# Patient Record
Sex: FEMALE | Race: WHITE | Employment: FULL TIME | URBAN - METROPOLITAN AREA
[De-identification: names, ages, dates, MRNs, and addresses within clinical notes are randomized per-mention and may not be internally consistent; named-entity substitution may affect disease eponyms.]

---

## 2017-05-26 ENCOUNTER — HOSPITAL ENCOUNTER (OUTPATIENT)
Age: 42
Discharge: HOME | End: 2017-05-26
Payer: COMMERCIAL

## 2017-05-26 LAB
ABSOLUTE EOS #: 0.1 K/UL (ref 0–0.4)
ABSOLUTE LYMPH #: 1.4 K/UL (ref 1–4.8)
ABSOLUTE MONO #: 0.3 K/UL (ref 0.1–1.3)
ALBUMIN SERPL-MCNC: 4 G/DL (ref 3.5–5.2)
ALBUMIN/GLOBULIN RATIO: ABNORMAL (ref 1–2.5)
ALP BLD-CCNC: 46 U/L (ref 35–104)
ALT SERPL-CCNC: 12 U/L (ref 5–33)
ANION GAP SERPL CALCULATED.3IONS-SCNC: 11 MMOL/L (ref 9–17)
AST SERPL-CCNC: 18 U/L
BASOPHILS # BLD: 1 %
BASOPHILS ABSOLUTE: 0 K/UL (ref 0–0.2)
BILIRUB SERPL-MCNC: 0.54 MG/DL (ref 0.3–1.2)
BUN BLDV-MCNC: 8 MG/DL (ref 6–20)
BUN/CREAT BLD: ABNORMAL (ref 9–20)
CALCIUM SERPL-MCNC: 9 MG/DL (ref 8.6–10.4)
CHLORIDE BLD-SCNC: 103 MMOL/L (ref 98–107)
CHOLESTEROL/HDL RATIO: 1.4
CHOLESTEROL: 113 MG/DL
CO2: 25 MMOL/L (ref 20–31)
CREAT SERPL-MCNC: 0.45 MG/DL (ref 0.5–0.9)
DIFFERENTIAL TYPE: ABNORMAL
EOSINOPHILS RELATIVE PERCENT: 2 %
ESTIMATED AVERAGE GLUCOSE: 134 MG/DL
FOLATE: >20 NG/ML
GFR AFRICAN AMERICAN: >60 ML/MIN
GFR NON-AFRICAN AMERICAN: >60 ML/MIN
GFR SERPL CREATININE-BSD FRML MDRD: ABNORMAL ML/MIN/{1.73_M2}
GFR SERPL CREATININE-BSD FRML MDRD: ABNORMAL ML/MIN/{1.73_M2}
GLUCOSE BLD-MCNC: 152 MG/DL (ref 70–99)
HBA1C MFR BLD: 6.3 % (ref 4–6)
HCT VFR BLD CALC: 36.8 % (ref 36–46)
HDLC SERPL-MCNC: 80 MG/DL
HEMOGLOBIN: 11.9 G/DL (ref 12–16)
IRON: 114 UG/DL (ref 37–145)
LDL CHOLESTEROL: 27 MG/DL (ref 0–130)
LYMPHOCYTES # BLD: 28 %
MAGNESIUM: 1.5 MG/DL (ref 1.6–2.6)
MCH RBC QN AUTO: 26.3 PG (ref 26–34)
MCHC RBC AUTO-ENTMCNC: 32.3 G/DL (ref 31–37)
MCV RBC AUTO: 81.4 FL (ref 80–100)
MONOCYTES # BLD: 7 %
PDW BLD-RTO: 14 % (ref 11.5–14.9)
PHOSPHORUS: 4 MG/DL (ref 2.6–4.5)
PLATELET # BLD: 201 K/UL (ref 150–450)
PLATELET ESTIMATE: ABNORMAL
PMV BLD AUTO: 9.4 FL (ref 6–12)
POTASSIUM SERPL-SCNC: 4.5 MMOL/L (ref 3.7–5.3)
RBC # BLD: 4.53 M/UL (ref 4–5.2)
RBC # BLD: ABNORMAL 10*6/UL
SEG NEUTROPHILS: 62 %
SEGMENTED NEUTROPHILS ABSOLUTE COUNT: 3.1 K/UL (ref 1.3–9.1)
SODIUM BLD-SCNC: 139 MMOL/L (ref 135–144)
TOTAL PROTEIN: 6.7 G/DL (ref 6.4–8.3)
TRIGL SERPL-MCNC: 31 MG/DL
VITAMIN B-12: 1521 PG/ML (ref 211–946)
VITAMIN D 25-HYDROXY: 21.5 NG/ML (ref 30–100)
VLDLC SERPL CALC-MCNC: NORMAL MG/DL (ref 1–30)
WBC # BLD: 4.9 K/UL (ref 3.5–11)
WBC # BLD: ABNORMAL 10*3/UL

## 2017-05-26 PROCEDURE — 83735 ASSAY OF MAGNESIUM: CPT

## 2017-05-26 PROCEDURE — 80061 LIPID PANEL: CPT

## 2017-05-26 PROCEDURE — 80053 COMPREHEN METABOLIC PANEL: CPT

## 2017-05-26 PROCEDURE — 84100 ASSAY OF PHOSPHORUS: CPT

## 2017-05-26 PROCEDURE — 82306 VITAMIN D 25 HYDROXY: CPT

## 2017-05-26 PROCEDURE — 83036 HEMOGLOBIN GLYCOSYLATED A1C: CPT

## 2017-05-26 PROCEDURE — 82746 ASSAY OF FOLIC ACID SERUM: CPT

## 2017-05-26 PROCEDURE — 85025 COMPLETE CBC W/AUTO DIFF WBC: CPT

## 2017-05-26 PROCEDURE — 84425 ASSAY OF VITAMIN B-1: CPT

## 2017-05-26 PROCEDURE — 36415 COLL VENOUS BLD VENIPUNCTURE: CPT

## 2017-05-26 PROCEDURE — 83540 ASSAY OF IRON: CPT

## 2017-05-26 PROCEDURE — 82607 VITAMIN B-12: CPT

## 2017-05-29 LAB — VITAMIN B1 WHOLE BLOOD: 78 NMOL/L (ref 70–180)

## 2021-07-19 ENCOUNTER — HOSPITAL ENCOUNTER (OUTPATIENT)
Age: 46
Discharge: HOME | End: 2021-07-19
Payer: COMMERCIAL

## 2021-07-19 LAB
ABSOLUTE EOS #: 0.2 K/UL (ref 0–0.4)
ABSOLUTE IMMATURE GRANULOCYTE: ABNORMAL K/UL (ref 0–0.3)
ABSOLUTE LYMPH #: 1.3 K/UL (ref 1–4.8)
ABSOLUTE MONO #: 0.4 K/UL (ref 0.1–1.3)
ALBUMIN SERPL-MCNC: 4.2 G/DL (ref 3.5–5.2)
ALBUMIN/GLOBULIN RATIO: ABNORMAL (ref 1–2.5)
ALP BLD-CCNC: 53 U/L (ref 35–104)
ALT SERPL-CCNC: 18 U/L (ref 5–33)
ANION GAP SERPL CALCULATED.3IONS-SCNC: 10 MMOL/L (ref 9–17)
AST SERPL-CCNC: 21 U/L
BASOPHILS # BLD: 1 % (ref 0–2)
BASOPHILS ABSOLUTE: 0 K/UL (ref 0–0.2)
BILIRUB SERPL-MCNC: 0.53 MG/DL (ref 0.3–1.2)
BUN BLDV-MCNC: 9 MG/DL (ref 6–20)
BUN/CREAT BLD: ABNORMAL (ref 9–20)
CALCIUM SERPL-MCNC: 9.2 MG/DL (ref 8.6–10.4)
CHLORIDE BLD-SCNC: 108 MMOL/L (ref 98–107)
CO2: 25 MMOL/L (ref 20–31)
CREAT SERPL-MCNC: 0.5 MG/DL (ref 0.5–0.9)
DIFFERENTIAL TYPE: ABNORMAL
EOSINOPHILS RELATIVE PERCENT: 5 % (ref 0–4)
FERRITIN: 57 UG/L (ref 13–150)
FOLATE: 13.2 NG/ML
GFR AFRICAN AMERICAN: >60 ML/MIN
GFR NON-AFRICAN AMERICAN: >60 ML/MIN
GFR SERPL CREATININE-BSD FRML MDRD: ABNORMAL ML/MIN/{1.73_M2}
GFR SERPL CREATININE-BSD FRML MDRD: ABNORMAL ML/MIN/{1.73_M2}
GLUCOSE BLD-MCNC: 147 MG/DL (ref 70–99)
HCT VFR BLD CALC: 37.2 % (ref 36–46)
HEMOGLOBIN: 12.2 G/DL (ref 12–16)
IMMATURE GRANULOCYTES: ABNORMAL %
IRON SATURATION: 29 % (ref 20–55)
IRON: 93 UG/DL (ref 37–145)
LYMPHOCYTES # BLD: 26 % (ref 24–44)
MAGNESIUM: 1.5 MG/DL (ref 1.6–2.6)
MCH RBC QN AUTO: 27.9 PG (ref 26–34)
MCHC RBC AUTO-ENTMCNC: 32.9 G/DL (ref 31–37)
MCV RBC AUTO: 85 FL (ref 80–100)
MONOCYTES # BLD: 8 % (ref 1–7)
NRBC AUTOMATED: ABNORMAL PER 100 WBC
PDW BLD-RTO: 13.2 % (ref 11.5–14.9)
PHOSPHORUS: 3.3 MG/DL (ref 2.6–4.5)
PLATELET # BLD: 218 K/UL (ref 150–450)
PLATELET ESTIMATE: ABNORMAL
PMV BLD AUTO: 8.6 FL (ref 6–12)
POTASSIUM SERPL-SCNC: 4.8 MMOL/L (ref 3.7–5.3)
RBC # BLD: 4.37 M/UL (ref 4–5.2)
RBC # BLD: ABNORMAL 10*6/UL
SEG NEUTROPHILS: 60 % (ref 36–66)
SEGMENTED NEUTROPHILS ABSOLUTE COUNT: 3.1 K/UL (ref 1.3–9.1)
SODIUM BLD-SCNC: 143 MMOL/L (ref 135–144)
TOTAL IRON BINDING CAPACITY: 317 UG/DL (ref 250–450)
TOTAL PROTEIN: 6.9 G/DL (ref 6.4–8.3)
UNSATURATED IRON BINDING CAPACITY: 224 UG/DL (ref 112–347)
VITAMIN B-12: 531 PG/ML (ref 232–1245)
VITAMIN D 25-HYDROXY: 19.1 NG/ML (ref 30–100)
WBC # BLD: 5 K/UL (ref 3.5–11)
WBC # BLD: ABNORMAL 10*3/UL

## 2021-07-19 PROCEDURE — 85025 COMPLETE CBC W/AUTO DIFF WBC: CPT

## 2021-07-19 PROCEDURE — 83735 ASSAY OF MAGNESIUM: CPT

## 2021-07-19 PROCEDURE — 80053 COMPREHEN METABOLIC PANEL: CPT

## 2021-07-19 PROCEDURE — 82728 ASSAY OF FERRITIN: CPT

## 2021-07-19 PROCEDURE — 83540 ASSAY OF IRON: CPT

## 2021-07-19 PROCEDURE — 84425 ASSAY OF VITAMIN B-1: CPT

## 2021-07-19 PROCEDURE — 82607 VITAMIN B-12: CPT

## 2021-07-19 PROCEDURE — 83550 IRON BINDING TEST: CPT

## 2021-07-19 PROCEDURE — 84100 ASSAY OF PHOSPHORUS: CPT

## 2021-07-19 PROCEDURE — 82306 VITAMIN D 25 HYDROXY: CPT

## 2021-07-19 PROCEDURE — 36415 COLL VENOUS BLD VENIPUNCTURE: CPT

## 2021-07-19 PROCEDURE — 82746 ASSAY OF FOLIC ACID SERUM: CPT

## 2021-07-23 LAB — VITAMIN B1 WHOLE BLOOD: 95 NMOL/L (ref 70–180)

## 2021-11-01 DIAGNOSIS — M25.571 CHRONIC PAIN OF RIGHT ANKLE: Primary | ICD-10-CM

## 2021-11-01 DIAGNOSIS — G89.29 CHRONIC PAIN OF RIGHT ANKLE: Primary | ICD-10-CM

## 2021-11-19 ENCOUNTER — OFFICE VISIT (OUTPATIENT)
Dept: ORTHOPEDIC SURGERY | Age: 46
End: 2021-11-19
Payer: COMMERCIAL

## 2021-11-19 VITALS — BODY MASS INDEX: 29.49 KG/M2 | WEIGHT: 177 LBS | HEIGHT: 65 IN

## 2021-11-19 DIAGNOSIS — M19.011 ARTHROSIS OF RIGHT ACROMIOCLAVICULAR JOINT: Primary | ICD-10-CM

## 2021-11-19 DIAGNOSIS — M25.511 RIGHT SHOULDER PAIN, UNSPECIFIED CHRONICITY: ICD-10-CM

## 2021-11-19 PROCEDURE — 99203 OFFICE O/P NEW LOW 30 MIN: CPT | Performed by: ORTHOPAEDIC SURGERY

## 2021-11-19 NOTE — LETTER
11/19/2021    Marylu Webster [address], [address] [facility]    RE: [place]    Dear Dr. Norah Jang,    Thank you for allowing me to participate in the care of Ms. Lázaro Belle. I had the opportunity to evaluate the patient on 11/19/2021. Attached you will find my evaluation and recommendations. Thanks again for the confidence you have expressed in me by allowing my participation in the care of your patient. I will keep you apprised of further developments in the patients treatment course as it progresses. If I can be of further assistance in any fashion, please feel free to contact me at your convenience.     Sincerely,         Mohamud Friedman  Shoulder and Elbow Surgery

## 2021-11-19 NOTE — PROGRESS NOTES
Orthopedic Shoulder Encounter Note     Chief complaint: right shoulder pain    HPI: Zhang Bra is a 51 y.o.  right-hand dominant female who presents for evaluation of her right shoulder. She indicates that she has been having pain for about 2 to 3 years and has been worse over the course of the past 2 to 3 months. She works a physical job at [employer]. She denies any precipitating trauma or injury but has been dealing with superior shoulder pain typically with activity although she is unable to lay on the right side at nighttime. Her shoulder does feel weak but she denies any stiffness. Previous treatment:    NSAIDs: None    Physical Therapy: No    Injections: She thinks that she may have had at least 3 cortisone injections to this right shoulder the last one coming approximately 1 year ago    Surgeries: None    Review of Systems:   Constitutional: Negative for fever, chills, sweats. Pain Score:   6  Neurological: Negative for headache, numbness, or weakness. Musculoskeletal: As noted in HPI     Past Medical History  Tyler Chawla  has a past medical history of Diabetes ([address] and Hypertension. Past Surgical History  Tyler Chawla  has a past surgical history that includes Ankle fracture surgery (Right); Cholecystectomy; and Growth plate surgery. Current Medications  Current Outpatient Medications   Medication Sig Dispense Refill    citalopram (CELEXA) 10 MG tablet Take 10 mg by mouth daily      sitaGLIPtin (JANUVIA) 50 MG tablet Take 50 mg by mouth daily (Patient not taking: Reported on 11/19/2021)      predniSONE (DELTASONE) 10 MG tablet Take 1 tablet by mouth daily 40 mg ×3 days, 30 mg ×3 days, 20 mg ×3 days, 10 mg ×3 days, then stop. (Patient not taking: Reported on 11/19/2021) 30 tablet 0    insulin glulisine (APIDRA) 100 UNIT/ML injection Inject 30 Units into the skin 3 times daily (before meals).  (Patient not taking: Reported on 11/19/2021)      insulin glargine (LANTUS) 100 UNIT/ML injection vial Inject 40 Units into the skin every morning. (Patient not taking: Reported on 11/19/2021)      lisinopril (PRINIVIL;ZESTRIL) 20 MG tablet Take 20 mg by mouth daily. (Patient not taking: Reported on 11/19/2021)      [employer] ([address] [employer]) [address] 1 pair of bariatric cruthes (Patient not taking: Reported on 11/19/2021) 1 each 0     No current facility-administered medications for this visit. Allergies  Allergies have been reviewed. Be Beard has No Known Allergies. Social History  Be Beard  reports that she has never smoked. She has never used smokeless tobacco. She reports current alcohol use. She reports that she does not use drugs. Family History  Liliana's family history is not on file. Physical Exam:     Ht 5' 5\" (1.651 m)   Wt 177 lb (80.3 kg)   BMI 29.45 kg/m²    Constitutional: Patient is oriented to person, place, and time. Patient appears well-developed and well nourished. Mental Status/psychiatric: Behavior is normal. Thought content normal.  HENT: Negative otherwise noted  Head: Normocephalic and Atraumatic  Nose: Normal  Respiratory/Cardio: Effort normal. No respiratory distress. Gait: normal    Shoulder:    Skin: Skin is warm and dry; no swelling or obvious muscular atrophy.    Vasculature: 2+ radial pulses bilaterally  Neuro: Sensation grossly intact to light touch diffusely  Tenderness: Palpation over the superior aspect of the right shoulder    ROM: (Degrees)    Right   A P   Left   A P    Elevation  140 155   Elevation  140   Abduction  135 145   Abduction  130    ER   70 75   ER   70   IR   T12    IR   T12   90 abd/ER      90 abd/ER     90 abd/IR      90 abd/IR     Crepitation  No    Crepitation No  Dyskenesia  No    Dyskenesia No      Muscle strength:    Right       Left    Deltoid   5    Deltoid   5  Supraspinatus  5    Supraspinatus  5  ER   5    ER   5  IR   5    IR   5    Special tests    Right   Rotator Cuff    Left    y   Painful arc    n   n   Pain with ER    n    n   Neer's     n    n   Hawkin's    n    n   Drop Arm    n  n   Lift off/Belly Press   n  n   ER Lag    n          AC Joint  y   AC tenderness   n  y   Cross-chest adduction  n       Labrum/biceps    y   [place]'s    n   n   Biceps sheer    n      n   Speed's/Yergason's   n    n   Tenderness Biceps Groove  n    n   Alexander's    n         Instability  n   Ant Apprehension   n    n   Post Apprehension   n    n   Ant Load shift    n    n   Post Load shift   n   n   Sulcus     n  n   Generalized Laxity   n  n   Relocation test   n  n   Crank test     n  n   [place]-superior escape  n       Imaging:  Xrays: 4 views of the right shoulder obtained on 11/19/21 were independently reviewed  Indications: Right shoulder pain  Findings: Normal glenohumeral joint space. Mild osteophytic change involving the distal clavicle at the acromioclavicular joint. Type II acromion. No obvious fracture, dislocation, or subluxation. Impression: Right shoulder radiographs with mild degenerative changes at the acromioclavicular joint. Impression/Plan:     Joanie Hope is a 51 y.o. old female with right shoulder pain that appears to be consistent with acromioclavicular joint arthrosis. I had a discussion with the patient today about this and discussed treatment options available to her. I did recommend proceeding conservatively at this time with a cortisone injection to the acromioclavicular joint. I would recommend to be done under ultrasound guidance to increase the accuracy of the injection. Consequently she was referred to Dr. Jenelle Short for this injection. She was amenable to this plan. All questions were answered. I anticipate improvement in resolution of her symptoms with this treatment and so I will see her back in my clinic as needed but she was encouraged to return or call at anytime with persistent or worsening symptoms and with any questions or concerns.     This note is created with the assistance of bryson speech recognition program.  While intending to generate adocument that actually reflects the content of the visit, the document can still have some errors including those of syntax and sound a like substitutions which may escape proof reading. It such instances, actual meaningcan be extrapolated by contextual diversion.     NA = Not assessed  RTC = Rotator cuff  RCT = Rotator cuff tear  ER = External rotation  IR = Internal rotation  AC = Acromioclavicular  GH = Glenohumeral  n = No  y = Yes

## 2021-11-22 ENCOUNTER — OFFICE VISIT (OUTPATIENT)
Dept: ORTHOPEDIC SURGERY | Age: 46
End: 2021-11-22
Payer: COMMERCIAL

## 2021-11-22 VITALS — DIASTOLIC BLOOD PRESSURE: 85 MMHG | HEART RATE: 61 BPM | SYSTOLIC BLOOD PRESSURE: 140 MMHG

## 2021-11-22 DIAGNOSIS — M19.011 ARTHROSIS OF RIGHT ACROMIOCLAVICULAR JOINT: Primary | ICD-10-CM

## 2021-11-22 PROCEDURE — 99203 OFFICE O/P NEW LOW 30 MIN: CPT | Performed by: FAMILY MEDICINE

## 2021-11-22 PROCEDURE — 20606 DRAIN/INJ JOINT/BURSA W/US: CPT | Performed by: FAMILY MEDICINE

## 2021-11-22 RX ORDER — TRIAMCINOLONE ACETONIDE 40 MG/ML
40 INJECTION, SUSPENSION INTRA-ARTICULAR; INTRAMUSCULAR ONCE
Status: COMPLETED | OUTPATIENT
Start: 2021-11-22 | End: 2021-11-22

## 2021-11-22 RX ORDER — BUPIVACAINE HYDROCHLORIDE 5 MG/ML
1 INJECTION, SOLUTION PERINEURAL ONCE
Status: COMPLETED | OUTPATIENT
Start: 2021-11-22 | End: 2021-11-22

## 2021-11-22 RX ADMIN — BUPIVACAINE HYDROCHLORIDE 5 MG: 5 INJECTION, SOLUTION PERINEURAL at 16:38

## 2021-11-22 RX ADMIN — TRIAMCINOLONE ACETONIDE 40 MG: 40 INJECTION, SUSPENSION INTRA-ARTICULAR; INTRAMUSCULAR at 16:39

## 2021-11-22 NOTE — PROGRESS NOTES
Sports Medicine Consultation    CHIEF COMPLAINT:  Shoulder Pain (Rt shoulder. 2-3yrs off and on. worse over last month. no trauma. [facility] referral for [facility] injection)        HPI:   The patient is a 51 y.o. female who is being seen as a new patient being seen for regarding new problem r shoulder. The patient is a right hand dominant female who has had shoulder pain for years. As far as trauma to the shoulder, the patient indicates no specific. The pain is  worse at night and when doing overhead activities. Weakness of the shoulder has  been noted. The pain restricts activities such as none, just hurts. The pain does not seem to improve with time. The following medications have been tried: tylenol without benefit. Physical Therapy has not been tried. Corticosteroid injection has not been done. Neck pain has not been present. she has a past medical history of Diabetes ([address] and Hypertension. she has a past surgical history that includes Ankle fracture surgery (Right); Cholecystectomy; and Growth plate surgery. Past Medical History:   Diagnosis Date    Diabetes ([address]     Hypertension        Past Surgical History:   Procedure Laterality Date    ANKLE FRACTURE SURGERY Right     pins    CHOLECYSTECTOMY      GROWTH PLATE SURGERY      plate in head       family history is not on file. Social History     Socioeconomic History    Marital status: Single     Spouse name: Not on file    Number of children: Not on file    Years of education: Not on file    Highest education level: Not on file   Occupational History    Not on file   Tobacco Use    Smoking status: Never Smoker    Smokeless tobacco: Never Used   Substance and Sexual Activity    Alcohol use:  Yes    Drug use: No    Sexual activity: Not on file   Other Topics Concern    Not on file   Social History Narrative    Not on file     Social Determinants of Health     Financial Resource Strain:     Difficulty of Paying Living Expenses: Not on file   Food Insecurity:     Worried About Running Out of Food in the Last Year: Not on file    Gerardo of Food in the Last Year: Not on file   Transportation Needs:     Lack of Transportation (Medical): Not on file    Lack of Transportation (Non-Medical): Not on file   Physical Activity:     Days of Exercise per Week: Not on file    Minutes of Exercise per Session: Not on file   Stress:     Feeling of Stress : Not on file   Social Connections:     Frequency of Communication with Friends and Family: Not on file    Frequency of Social Gatherings with Friends and Family: Not on file    Attends Shinto Services: Not on file    Active Member of [address] [place] [address] [employer] or Organizations: Not on file    Attends Club or Organization Meetings: Not on file    Marital Status: Not on file   Intimate Partner Violence:     Fear of Current or Ex-Partner: Not on file    Emotionally Abused: Not on file    Physically Abused: Not on file    Sexually Abused: Not on file   Housing Stability:     Unable to Pay for Housing in the Last Year: Not on file    Number of [place] in the Last Year: Not on file    Unstable Housing in the Last Year: Not on file       Current Outpatient Medications   Medication Sig Dispense Refill    sitaGLIPtin (JANUVIA) 50 MG tablet Take 50 mg by mouth daily (Patient not taking: Reported on 11/19/2021)      citalopram (CELEXA) 10 MG tablet Take 10 mg by mouth daily      predniSONE (DELTASONE) 10 MG tablet Take 1 tablet by mouth daily 40 mg ×3 days, 30 mg ×3 days, 20 mg ×3 days, 10 mg ×3 days, then stop. (Patient not taking: Reported on 11/19/2021) 30 tablet 0    insulin glulisine (APIDRA) 100 UNIT/ML injection Inject 30 Units into the skin 3 times daily (before meals). (Patient not taking: Reported on 11/19/2021)      insulin glargine (LANTUS) 100 UNIT/ML injection vial Inject 40 Units into the skin every morning.  (Patient not taking: Reported on 11/19/2021)      lisinopril (PRINIVIL;ZESTRIL) 20 MG tablet Take 20 mg by mouth daily. (Patient not taking: Reported on 11/19/2021)      [employer] ([address]) [address] 1 pair of bariatric cruthes (Patient not taking: Reported on 11/19/2021) 1 each 0     No current facility-administered medications for this visit. Allergies:  shehas No Known Allergies. ROS:  CV:  Denies chest pain; palpitations; shortness of breath; swelling of feet, ankles; and loss of consciousness. CON: Denies fever and dizziness. ENT:  Denies hearing loss / ringing, ear infections hoarseness, and swallowing problems. RESP:  Denies chronic cough, spitting up blood, and asthma/wheezing. GI: Denies abdominal pain, change in bowel habits, nausea or vomiting, and blood in stools. :  Denies frequent urination, burning or painful urination, blood in the urine, and bladder incontinence. NEURO:  Denies headache, memory loss, sleep disturbance, and tremor or movement disorder. PHYSICAL EXAM:   BP (!) 143/85   Pulse 57   GENERAL: Charlsie Goltz is a 51 y.o. female who is alert and oriented and sitting comfortably in our office. SKIN:  Intact without rashes, lesions or ulcerations. No obvious deformity or swelling. NEURO: Musculoskeletal and axillary nerves intact to sensory and motor testing. EYES:  Extraocular muscles intact. MOUTH: Oral mucosa moist.  No perioral lesions. PULM:  Respirations unlabored and regular. VASC:  Capillary refill less than 3 seconds. Cervical spine ROM WNL  Spurlings: negative,     MSK:  Forward elevation 180 degrees, external rotation in neutral 90 degrees, abduction 180 degrees, internal rotation to T8. Supraspinatus 5/5   External rotators 5/5  Internal 5/5  Full Can negative   Empty Can negative   Neer's test negative   Cruz-Ismael test. negative. Pain with cross body adduction positive. Anterior Labral Stress test negative. Speed's test negative   Ossian's test negative.    Pain over Patient tolerated the procedure well.

## 2022-01-10 ENCOUNTER — OFFICE VISIT (OUTPATIENT)
Dept: ORTHOPEDIC SURGERY | Age: 47
End: 2022-01-10
Payer: COMMERCIAL

## 2022-01-10 VITALS — WEIGHT: 177 LBS | RESPIRATION RATE: 12 BRPM | HEIGHT: 65 IN | BODY MASS INDEX: 29.49 KG/M2

## 2022-01-10 DIAGNOSIS — M79.671 RIGHT FOOT PAIN: Primary | ICD-10-CM

## 2022-01-10 DIAGNOSIS — S92.424A CLOSED NONDISPLACED FRACTURE OF DISTAL PHALANX OF RIGHT GREAT TOE, INITIAL ENCOUNTER: Primary | ICD-10-CM

## 2022-01-10 PROCEDURE — 99213 OFFICE O/P EST LOW 20 MIN: CPT | Performed by: ORTHOPAEDIC SURGERY

## 2022-01-10 NOTE — PROGRESS NOTES
[address] [facility] ORTHOPEDICS AND SPORTS MEDICINE  [address] [facility]  [address] [facility] [address]  Dept: [phone]    Ambulatory Orthopedic Consult      CHIEF COMPLAINT:    Chief Complaint   Patient presents with    Toe Pain     Right, great toe       HISTORY OF PRESENT ILLNESS:      The patient is a 51 y.o. female who is being seen for evaluation of the above, which began on 1/6/2022 secondary to a fall down several steps  . At today's visit, she is using a cast shoe. History is obtained today from:   [x]  the patient     [x]  EMR     []  one family member/friend    []  multiple family members/friends    []  other:          REVIEW OF SYSTEMS:  Musculoskeletal: See HPI for pertinent positives     Past Medical History:    She  has a past medical history of Diabetes ([address] and Hypertension. Past Surgical History:    She  has a past surgical history that includes Ankle fracture surgery (Right); Cholecystectomy; and Growth plate surgery. Current Medications:     Current Outpatient Medications:     sitaGLIPtin (JANUVIA) 50 MG tablet, Take 50 mg by mouth daily (Patient not taking: Reported on 11/19/2021), Disp: , Rfl:     citalopram (CELEXA) 10 MG tablet, Take 10 mg by mouth daily, Disp: , Rfl:     predniSONE (DELTASONE) 10 MG tablet, Take 1 tablet by mouth daily 40 mg ×3 days, 30 mg ×3 days, 20 mg ×3 days, 10 mg ×3 days, then stop. (Patient not taking: Reported on 11/19/2021), Disp: 30 tablet, Rfl: 0    insulin glulisine (APIDRA) 100 UNIT/ML injection, Inject 30 Units into the skin 3 times daily (before meals). (Patient not taking: Reported on 11/19/2021), Disp: , Rfl:     insulin glargine (LANTUS) 100 UNIT/ML injection vial, Inject 40 Units into the skin every morning. (Patient not taking: Reported on 11/19/2021), Disp: , Rfl:     lisinopril (PRINIVIL;ZESTRIL) 20 MG tablet, Take 20 mg by mouth daily.  (Patient not taking: Reported on 11/19/2021), Disp: , Rfl:     [employer] Devices ([address]) MISC, 1 pair of bariatric cruthes (Patient not taking: Reported on 11/19/2021), Disp: 1 each, Rfl: 0     Allergies:    Patient has no known allergies. Family History:  family history is not on file. Social History:   Social History     Occupational History    Not on file   Tobacco Use    Smoking status: Never Smoker    Smokeless tobacco: Never Used   Substance and Sexual Activity    Alcohol use: Yes    Drug use: No    Sexual activity: Not on file     Works full-time at [employer]:  Resp 12   Ht 5' 5\" (1.651 m)   Wt 177 lb (80.3 kg)   BMI 29.45 kg/m²    Psych: awake, alert  Cardio:  well perfused extremities, no cyanosis  Resp:  normal respiratory effort  Musculoskeletal:    Affected lower extremity:    Vascular: Limb well perfused, compartments soft/compressible. Skin: No erythema/ulcers. Intact. Neurovascular Status:  Grossly neurovascularly intact throughout  Motion:  Grossly able to fire major muscle groups with appropriate/expected AROM  Tenderness to Palpation: Great toe diffusely--mild  -Significant ecchymosis diffusely throughout the great toe      RADIOLOGY:   1/10/2022 FINDINGS:  Three views (AP, Mortise, and Lateral) of the right ankle and three views (AP, Oblique, Lateral) of the right foot were obtained in the office today and reviewed, revealing minimally displaced fracture at the distal phalanx of the great toe with intra-articular extension. Retained hardware in the distal fibula with degenerative changes at the tibiotalar joint with joint space narrowing, sclerosis, and osteophytes. .     IMPRESSION: Osseous injury as above. Electronically signed by Rustam Dillon MD      Relevant previous imaging reviewed, both imaging and report(s) as below:    No results found. ASSESSMENT AND PLAN:  Body mass index is 29.45 kg/m².        She has a right great toe fracture of the distal phalanx, sustained on 1/6/2022. Notably, she has the past medical history as above. She has a history of insulin-dependent diabetes. We had a discussion today about the likely diagnosis and its natural history, physical exam and imaging findings, as well as various treatment options in detail. Surgically, we discussed that I do not recommend surgical invention at this time, and did recommend conservative management at today's visit, but did discuss a possible future surgery depending on her possible displacement. We did discuss the risk of displacement and possible future pain/arthritis/surgery. Orders/referrals were placed as below at today's visit. The patient will avoid pain provoking activity. She may continue weightbearing as tolerated, and she will continue to use her cast shoe. All questions were answered and the above plan was agreed upon. The patient will return to clinic in 2 weeks with repeat right foot x-rays to monitor for displacement. At her next visit, we will review her new x-rays, and possibly discuss her retained hardware and ankle arthritis. At the patient's next visit, depending on how the patient is doing and/or new imaging/labs results, we may consider the following options:    []  Lace up ankle     []  CAM boot         []  removable wrist brace     []  PT:        []  Wean out immobilization         []  Adv activity      []  Footmind        []  Spenco       []  Custom Orthotic:               []  AZ brace                    []  Rocker Bottom      []  Night splint    []  Heel cups        []  Strap        []  Toe gizmos    []  Topl        []  NSAIDs         []  Sahil        []  Ref:         []  Stress Xray    []  CT        []  MRI  []  Inj:          []  Consider OR      []  Pick OR date    No follow-ups on file. No orders of the defined types were placed in this encounter. No orders of the defined types were placed in this encounter.         Slava Pulido, MD  Orthopedic Surgery        Please excuse any typos/errors, as this note was created with the assistance of voice recognition software. While intending to generate a document that actually reflects the content of the visit, the document can still have some errors including those of syntax and sound-a-like substitutions which may escape proof reading. In such instances, actual meaning can be extrapolated by context.

## 2022-01-10 NOTE — LETTER
[facility]  [address] [address] [place] [id number]  Phone: [phone]  Fax: [phone]    Lorenza Garcia MD    January 10, 2022     [address], [address] [facility]    Patient: Finn Ibrahim   MR Number: [id number]   YOB: 1975   Date of Visit: 1/10/2022       Dear Jason Junior: Thank you for referring Adia Brush to me for evaluation/treatment. Below are the relevant portions of my assessment and plan of care. She has a right great toe fracture of the distal phalanx, sustained on 1/6/2022. Notably, she has the past medical history as above. She has a history of insulin-dependent diabetes. We had a discussion today about the likely diagnosis and its natural history, physical exam and imaging findings, as well as various treatment options in detail. Surgically, we discussed that I do not recommend surgical invention at this time, and did recommend conservative management at today's visit, but did discuss a possible future surgery depending on her possible displacement. We did discuss the risk of displacement and possible future pain/arthritis/surgery. Orders/referrals were placed as below at today's visit. The patient will avoid pain provoking activity. She may continue weightbearing as tolerated, and she will continue to use her cast shoe. All questions were answered and the above plan was agreed upon. The patient will return to clinic in 2 weeks with repeat right foot x-rays to monitor for displacement. At her next visit, we will review her new x-rays, and possibly discuss her retained hardware and ankle arthritis. If you have questions, please do not hesitate to call me. I look forward to following Ivania Yuan along with you.     Sincerely,      Lorenza Garcia MD

## 2022-01-21 DIAGNOSIS — M79.671 RIGHT FOOT PAIN: Primary | ICD-10-CM

## 2022-01-21 DIAGNOSIS — S92.424A CLOSED NONDISPLACED FRACTURE OF DISTAL PHALANX OF RIGHT GREAT TOE, INITIAL ENCOUNTER: ICD-10-CM

## 2022-01-24 ENCOUNTER — OFFICE VISIT (OUTPATIENT)
Dept: ORTHOPEDIC SURGERY | Age: 47
End: 2022-01-24
Payer: COMMERCIAL

## 2022-01-24 VITALS — WEIGHT: 177 LBS | RESPIRATION RATE: 12 BRPM | HEIGHT: 65 IN | BODY MASS INDEX: 29.49 KG/M2

## 2022-01-24 DIAGNOSIS — Z96.9 RETAINED ORTHOPEDIC HARDWARE: ICD-10-CM

## 2022-01-24 DIAGNOSIS — M76.71 PERONEAL TENDINITIS OF RIGHT LOWER EXTREMITY: ICD-10-CM

## 2022-01-24 DIAGNOSIS — S92.424D CLOSED NONDISPLACED FRACTURE OF DISTAL PHALANX OF RIGHT GREAT TOE WITH ROUTINE HEALING, SUBSEQUENT ENCOUNTER: Primary | ICD-10-CM

## 2022-01-24 DIAGNOSIS — M19.171 POST-TRAUMATIC ARTHRITIS OF RIGHT ANKLE: ICD-10-CM

## 2022-01-24 PROCEDURE — 99213 OFFICE O/P EST LOW 20 MIN: CPT | Performed by: ORTHOPAEDIC SURGERY

## 2022-01-24 NOTE — LETTER
[facility]  [address] [facility] [address]  Phone: [phone]  Fax: [phone]    Lorenza Garcia MD        January 24, 2022     Patient: Finn Ibrahim   YOB: 1975   Date of Visit: 1/24/2022       To Whom It May Concern: It is my medical opinion that Adia Hands may return to full duty immediately with no restrictions. If you have any questions or concerns, please don't hesitate to call.     Sincerely,    The office of Elvi Barr MD

## 2022-01-24 NOTE — PROGRESS NOTES
[facility] ORTHOPEDICS AND SPORTS MEDICINE  [address] [facility]  [address] [facility] [address]  Dept: [phone]    Ambulatory Orthopedic Consult      CHIEF COMPLAINT:    Chief Complaint   Patient presents with    Toe Pain     right       HISTORY OF PRESENT ILLNESS:      The patient is a 51 y.o. female who is being seen for evaluation of the above, which began on 1/6/2022 secondary to a fall down several steps  . At today's visit, she is using a cast shoe. History is obtained today from:   [x]  the patient     [x]  EMR     []  one family member/friend    []  multiple family members/friends    []  other:      INTERVAL HISTORY 1/24/2022:  She is seen again today in the office for follow up of a previous issue (as above). Since being seen last, the patient is doing better. At today's visit, she is not using a brace or assistive device. History is obtained today from:   [x]  the patient     []  EMR     []  one family member/friend    []  multiple family members/friends    []  other:          REVIEW OF SYSTEMS:  Musculoskeletal: See HPI for pertinent positives     Past Medical History:    She  has a past medical history of Diabetes ([address] and Hypertension. Past Surgical History:    She  has a past surgical history that includes Ankle fracture surgery (Right); Cholecystectomy; and Growth plate surgery. Current Medications:     Current Outpatient Medications:     sitaGLIPtin (JANUVIA) 50 MG tablet, Take 50 mg by mouth daily (Patient not taking: Reported on 11/19/2021), Disp: , Rfl:     citalopram (CELEXA) 10 MG tablet, Take 10 mg by mouth daily, Disp: , Rfl:     predniSONE (DELTASONE) 10 MG tablet, Take 1 tablet by mouth daily 40 mg ×3 days, 30 mg ×3 days, 20 mg ×3 days, 10 mg ×3 days, then stop.  (Patient not taking: Reported on 11/19/2021), Disp: 30 tablet, Rfl: 0    insulin glulisine (APIDRA) 100 UNIT/ML injection, Inject 30 Units into the skin 3 times daily (before meals). (Patient not taking: Reported on 11/19/2021), Disp: , Rfl:     insulin glargine (LANTUS) 100 UNIT/ML injection vial, Inject 40 Units into the skin every morning. (Patient not taking: Reported on 11/19/2021), Disp: , Rfl:     lisinopril (PRINIVIL;ZESTRIL) 20 MG tablet, Take 20 mg by mouth daily. (Patient not taking: Reported on 11/19/2021), Disp: , Rfl:     Misc. Devices ([address]) MISC, 1 pair of bariatric cruthes (Patient not taking: Reported on 11/19/2021), Disp: 1 each, Rfl: 0     Allergies:    Patient has no known allergies. Family History:  family history is not on file. Social History:   Social History     Occupational History    Not on file   Tobacco Use    Smoking status: Never Smoker    Smokeless tobacco: Never Used   Substance and Sexual Activity    Alcohol use: Yes    Drug use: No    Sexual activity: Not on file     Works full-time at [employer]:  Resp 12   Ht 5' 5\" (1.651 m)   Wt 177 lb (80.3 kg)   BMI 29.45 kg/m²    Psych: awake, alert  Cardio:  well perfused extremities, no cyanosis  Resp:  normal respiratory effort  Musculoskeletal:    Affected lower extremity:    Vascular: Limb well perfused, compartments soft/compressible. Skin: No erythema/ulcers. Intact. Neurovascular Status:  Grossly neurovascularly intact throughout  Motion:  Grossly able to fire major muscle groups with appropriate/expected AROM  Tenderness to Palpation: Great toe diffusely--improved, fibula hardware and peroneal tendons, anterior ankle joint line  -      RADIOLOGY:   1/24/2022 FINDINGS:  Three views (AP, Oblique, Lateral) of the right foot were obtained in the office today and reviewed, revealing minimally displaced fracture at the distal phalanx of the great toe with intra-articular extension, without interval displacement, with interval healing noted.   Retained hardware in the distal fibula with degenerative changes at the tibiotalar joint with joint space narrowing, sclerosis, and osteophytes. IMPRESSION: Osseous injury as above. Electronically signed by Martha Rich MD      Relevant previous imaging reviewed, both imaging and report(s) as below:    No results found. ASSESSMENT AND PLAN:  Body mass index is 29.45 kg/m². She has a right great toe fracture of the distal phalanx, sustained on 1/6/2022. She is doing well overall with conservative management. She has right ankle pain with retained hardware and peroneal tendinitis, as well as underlying posttraumatic ankle arthritis. Notably, she has the past medical history as above. She has a history of insulin-dependent diabetes (she now reports that she does not take insulin and her diabetes is in remission, after she had a gastric bypass surgery). We had a discussion today about the likely diagnosis and its natural history, physical exam and imaging findings, as well as various treatment options in detail. Surgically, we discussed that I do not recommend surgical invention at this time for her great toe. We have discussed the risks of displacement. Surgically, we have discussed surgical options for her right ankle, including removal of hardware and peroneal tendon exploration. We discussed the expected postoperative course, including the relevant weightbearing restrictions/immobilization. We also discussed the patient's ability to return to work, as well as an estimate of the anticipated timeframe to return to work, in the context of their specific job functions/level of activity. No guarantees were made. At today's visit, she does state that she is interested in surgery in the future, and states that she would be interested in proceeding in the summer (after her busy season at work ends in July 2022).   She briefly met with my surgical scheduler at today's visit, Mily Lee, and she will call us in the future if/when she wishes to proceed with surgery. Orders/referrals were placed as below at today's visit. The patient will avoid pain provoking activity. She may continue weightbearing as tolerated, and she may continue to ambulate in regular shoes. All questions were answered and the above plan was agreed upon. The patient will return to clinic in the future as needed. At her next visit, we may again discuss surgery as above. At the patient's next visit, depending on how the patient is doing and/or new imaging/labs results, we may consider the following options:    []  Lace up ankle     []  CAM boot         []  removable wrist brace     []  PT:        []  Wean out immobilization         []  Adv activity      []  Footmind        []  Spenco       []  Custom Orthotic:               []  AZ brace                    []  Rocker Bottom      []  Night splint    []  Heel cups        []  Strap        []  Toe gizmos    []  Topl        []  NSAIDs         []  Sahil        []  Ref:         []  Stress Xray    []  CT        []  MRI  []  Inj:          []  Consider OR      []  Pick OR date    No follow-ups on file. No orders of the defined types were placed in this encounter. No orders of the defined types were placed in this encounter. Cl Alfaro MD  Orthopedic Surgery        Please excuse any typos/errors, as this note was created with the assistance of voice recognition software. While intending to generate a document that actually reflects the content of the visit, the document can still have some errors including those of syntax and sound-a-like substitutions which may escape proof reading. In such instances, actual meaning can be extrapolated by context.

## 2022-10-14 DIAGNOSIS — S92.424D CLOSED NONDISPLACED FRACTURE OF DISTAL PHALANX OF RIGHT GREAT TOE WITH ROUTINE HEALING, SUBSEQUENT ENCOUNTER: ICD-10-CM

## 2022-10-14 DIAGNOSIS — S92.424A CLOSED NONDISPLACED FRACTURE OF DISTAL PHALANX OF RIGHT GREAT TOE, INITIAL ENCOUNTER: Primary | ICD-10-CM

## 2022-10-24 ENCOUNTER — HOSPITAL ENCOUNTER (OUTPATIENT)
Dept: PHYSICAL THERAPY | Age: 47
Setting detail: THERAPIES SERIES
Discharge: HOME | End: 2022-10-24
Payer: COMMERCIAL

## 2022-10-24 PROCEDURE — 97116 GAIT TRAINING THERAPY: CPT

## 2022-10-24 PROCEDURE — 97161 PT EVAL LOW COMPLEX 20 MIN: CPT

## 2022-10-24 NOTE — CONSULTS
[] [facility]) - [facility] &  Therapy  [address]    [phone]  F: [phone]   [] [address] [facility] [address]   [address]  P: [phone]  F: [phone]  [] [address] &  Therapy  [address] [facility] [address]  P: [phone]  F: [phone]   [] [address]  P: [phone]  F: [phone]  [x] [facility] - [facility] & Therapy  [address]  [place]: [phone]   F: [phone] [] [address]  [facility] [address]   [place]: [phone]  F: [phone]           Physical Therapy Evaluation    Date:  10/24/2022   Patient: Kemi Floyd  : 1975  MRN: [id number]  Physician: Dr. Krys Romero MD    Insurance: [facility] Diagnosis:   B54.822G (ICD-10-CM) - Closed nondisplaced fracture of distal phalanx of right great toe, initial encounter   S92.424D (ICD-10-CM) - Closed nondisplaced fracture of distal phalanx of right great toe with routine healing, subsequent encounter   Rehab Codes: M25.571, M62.81  Onset date: 10/14/2022 referral   Next Dr's appt.: 11/10/2022  Surgery: 2022 - R foot hardware removal with peroneal exploration    Subjective:   CC/HPI: Veronica Bowen is a 49 y.o. female presenting for DME evaluation to learn R LE NWB prior to R hardware removal with peroneal exploration with Dr. Luis Armando Talavera on 2022. Pt reports history of R ankle hardware placement around . Pt reports increased pain and irritation from her hardware. Pt reports that she is unable to tolerate some shoes and pressure on her lateral R ankle. Pt reports that she had a gastric bypass surgery in  and then she started noticing increasing symptoms over the last 1+ years.  Pt notes that she broke her R great toe in January 2022. Pt reports history of R shoulder pain. Pt denies history of L LE injury. Assistive devices patient has: Crutches, knee scooter, walker, wheelchair    PMHx: Diabetes, HTN, anxiety, depression    Tests: [x] X-Ray:   1/24/2022 FINDINGS:  Three views (AP, Oblique, Lateral) of the right foot    were obtained in the office today and reviewed, revealing minimally    displaced fracture at the distal phalanx of the great toe with    intra-articular extension, without interval displacement, with interval    healing noted. Retained hardware in the distal fibula with degenerative    changes at the tibiotalar joint with joint space narrowing, sclerosis, and    osteophytes. Medications:  [x] Refer to full medical record   Allergies:       [x] Refer to full medical record         Martial Status    Home type Currently living in trailer, currently in the process of moving to 2 story house with bedroom upstairs   Stairs from outside [place] [address] with bilateral HR  House - 3 steps but also ramp, no steps into garage   Stairs inside [address] [place] [address] up to bedroom   [facility] status  for outside lawn and garden   Work Activities/duties  Highly physical - prolonged walking and standing, lifting, moving objects   Recreational Activities Taking dogs for walk, currently renovating home, movies       Pain present?  None at rest   Location R lateral ankle   Pain Rating currently 0/10   Pain at worse 5/10 normal every day pain  10/10 if bumped   Pain at best 0/10   Description of pain Intermittent    Altered Sensation N/A   What makes it worse Cold, pressure on lateral ankle   What makes it better Medication, lying on L side   Pain altered treatment/action N/A   Symptom progression Increasing    Sleep Not disturbed             Objective:    ROM: global UE and LE AROM WFL                   Strength: bilateral UE grossly 5/5, bilateral hips 4+/5 globally, bilateral knees 5/5 globally, R ankle 4+/5 globally, L ankle 5/5 globally               Edema: N/A        Flexibility: global UE and LE WFL              Palpation/Pain: tenderness to palpation of R lateral ankle, greatest around lateral malleolus where hardware is most superficial        Special tests: deferred      Educated pt on use of DME, including: (Check box of device used)     [x] Knee Scooter: Instructed pt on appropriate height being even with contralateral knee. Reviewed safety concerns such as not gliding on turns and using breaks appropriately. [x] Rolling Walker: Instructed pt on appropriate height of even with wrists with arms at resting at side. Educated pt on safe gait pattern while using walker. Explained to pt the difference between a 4 wheeled walker and rolling walker and how a rolling walker is most appropriate for a NWB pt. [x] Axillary Crutches: Instructed pt on appropriate height of two finger width between crutch and axilla, as well as elbow flexion of approximately 20deg. Educated pt on how to adjust both crutch and handle height. [x] Stairs: stair climbing performed with crutch + HR ascend and descend and scooting technique demonstrated    Pt with good understanding of all techniques/uses and expressed no safety concerns. At this time pt will most benefit from use of: knee scooter and axillary crutch while performing stairs      Comments: Rambo Archibald presents for DME evaluation to learn R LE NWB prior to R hardware removal and peroneal exploration on 11/16/2022. Pt able to maintain R NWB precaution throughout evaluation. Pt safely performs transfers and ambulation with all assistive devices. Pt is able to safely negotiate stairs with one crutch and use of hand rail as well as performing scooting technique. Physical therapy signs for safe discharge home following surgery.          Assessment:  STG: (to be met in 1 treatments)  Educate patient on proper use of DME - MET 10/24/22  Patient to perform transfers and weight bearing status independent without assistance - MET 10/24/22  ? Strength: - MET 10/24/22  Independent with Home Exercise Programs - MET 10/24/22  Demonstrate Knowledge of fall prevention - MET 10/24/22                     Patient goals:    Rehab Potential:  [x] Good  [] Fair  [] Poor   Suggested Professional Referral:  [x] No  [] Yes:  Barriers to Goal Achievement[de-identified]  [x] No  [] Yes:  Domestic Concerns:  [x] No  [] Yes:    Pt. Education:  [x] Plans/Goals, Risks/Benefits discussed  [x] Home exercise program    Method of Education: [x] Verbal  [x] Demo  [x] Written  Access Code: [facility]  URL: [email or web address]  Date: 10/24/2022  Prepared by: Julia Parrish    Exercises  Side Stepping with Resistance at Ankles - 1 x daily - 7 x weekly - 2 sets - 10 reps  Standing Hip Abduction with Resistance at Ankles - 1 x daily - 7 x weekly - 2 sets - 10 reps  3-Way Hip (Extension) - 1 x daily - 7 x weekly - 2 sets - 10 reps  Standing Hip Flexion with Resistance Loop - 1 x daily - 7 x weekly - 2 sets - 10 reps  Supine Bridge with Resistance Band - 1 x daily - 7 x weekly - 2 sets - 10 reps  Sidelying Hip Abduction with Resistance at Thighs - 1 x daily - 7 x weekly - 2 sets - 10 reps    Comprehension of Education:  [x] Verbalizes understanding. [x] Demonstrates understanding. [] Needs Review. [] Demonstrates/verbalizes understanding of HEP/Ed previously given. Treatment Plan:  [x] Therapeutic Exercise      [] Manual Therapy       [x] Instruction in HEP        [] Neuromuscular Re-education     [] Vasocompression [facility])        [x] Gait Training                      []  Medication allergies reviewed for use of    Dexamethasone Sodium Phosphate 4mg/ml     with iontophoresis treatments. Pt is not allergic.     Frequency:  1 x/week for 1 visits      Todays Treatment:    Exercises:  Exercise     Reps/ Time Weight/ Level Comments   Gait training 15 min  R LE NWB for all:  - transfers from various surfaces with crutches, walker, knee scooter  - ambulation with crutches, walker, knee scooter  - stair negotiation with crutch and scooting         Education x  - ice and elevation  - importance of adhering to NWB precautions for healing   HEP x  - exercises given for hip strengthening for decreased fatigue while performing stairs                                 Other:    Specific Instructions for next treatment: discharge per independence with all DME    Evaluation Complexity:  History (Personal factors, comorbidities) [] 0 [x] 1-2 [] 3+   Exam (limitations, restrictions) [x] 1-2 [] 3 [] 4+   Clinical presentation (progression) [x] Stable [] Evolving  [] Unstable   Decision Making [x] Low [] Moderate [] High    [x] Low Complexity [] Moderate Complexity [] High Complexity         [x]  Patient was able to demonstrate compliance with the relevant weight bearing restriction, and safe discharge to home is anticipated after surgery. []  Patient was unable to demonstrate compliance with the relevant weight bearing restriction, and further sessions with PT are unlikely to help this; discharge to a SNF is anticipated after surgery.       The following pieces of DME are mandatory for safe discharge to home:    []   rolling walker (2-wheel)      [x]   crutches      [x]   rolling knee scooter      []   wheelchair      []   other: ________________      The following pieces of DME are recommended as helpful but are optional:    [x]   rolling walker (2-wheel)      []   crutches      []   rolling knee scooter      []   wheelchair       []   other: ________________        Treatment Charges: Mins Units   [x] Evaluation       [x]  Low       []  Moderate       []  High 20 1   []  Modalities     [x]  Ther Exercise 5 --   []  Manual Therapy     []  Ther Activities     []  Aquatics     []  Vasocompression     [x]  Gait 15 1     TOTAL TREATMENT TIME: 40    Time in: 11:05 am   Time Out: 11:45 am    Electronically signed by: Jean Carlos Renteria EMILY Jackman        Physician Signature:________________________________Date:__________________  By signing above or cosigning this note, I have reviewed this plan of care and certify a need for medically necessary rehabilitation services.      *PLEASE SIGN ABOVE AND FAX BACK ALL PAGES*

## 2022-11-02 ENCOUNTER — HOSPITAL ENCOUNTER (OUTPATIENT)
Dept: PREADMISSION TESTING | Age: 47
Discharge: HOME | End: 2022-11-06
Payer: COMMERCIAL

## 2022-11-02 VITALS
TEMPERATURE: 97.1 F | HEART RATE: 64 BPM | BODY MASS INDEX: 32.09 KG/M2 | WEIGHT: 192.6 LBS | HEIGHT: 65 IN | DIASTOLIC BLOOD PRESSURE: 89 MMHG | SYSTOLIC BLOOD PRESSURE: 144 MMHG | OXYGEN SATURATION: 97 % | RESPIRATION RATE: 16 BRPM

## 2022-11-02 LAB
ANION GAP SERPL CALCULATED.3IONS-SCNC: 9 MMOL/L (ref 9–17)
BUN BLDV-MCNC: 12 MG/DL (ref 6–20)
BUN/CREAT BLD: 19 (ref 9–20)
CALCIUM SERPL-MCNC: 9.3 MG/DL (ref 8.6–10.4)
CHLORIDE BLD-SCNC: 103 MMOL/L (ref 98–107)
CO2: 27 MMOL/L (ref 20–31)
CREAT SERPL-MCNC: 0.64 MG/DL (ref 0.5–0.9)
GFR SERPL CREATININE-BSD FRML MDRD: >60 ML/MIN/1.73M2
GLUCOSE BLD-MCNC: 132 MG/DL (ref 70–99)
HCT VFR BLD CALC: 37.7 % (ref 36.3–47.1)
HEMOGLOBIN: 11.9 G/DL (ref 11.9–15.1)
MCH RBC QN AUTO: 27.6 PG (ref 25.2–33.5)
MCHC RBC AUTO-ENTMCNC: 31.6 G/DL (ref 28.4–34.8)
MCV RBC AUTO: 87.5 FL (ref 82.6–102.9)
NRBC AUTOMATED: 0 PER 100 WBC
PDW BLD-RTO: 12.6 % (ref 11.8–14.4)
PLATELET # BLD: 190 K/UL (ref 138–453)
PMV BLD AUTO: 10.6 FL (ref 8.1–13.5)
POTASSIUM SERPL-SCNC: 4.5 MMOL/L (ref 3.7–5.3)
RBC # BLD: 4.31 M/UL (ref 3.95–5.11)
SODIUM BLD-SCNC: 139 MMOL/L (ref 135–144)
WBC # BLD: 5 K/UL (ref 3.5–11.3)

## 2022-11-02 PROCEDURE — 83036 HEMOGLOBIN GLYCOSYLATED A1C: CPT

## 2022-11-02 PROCEDURE — 36415 COLL VENOUS BLD VENIPUNCTURE: CPT

## 2022-11-02 PROCEDURE — 93005 ELECTROCARDIOGRAM TRACING: CPT | Performed by: ANESTHESIOLOGY

## 2022-11-02 PROCEDURE — 85027 COMPLETE CBC AUTOMATED: CPT

## 2022-11-02 PROCEDURE — 80048 BASIC METABOLIC PNL TOTAL CA: CPT

## 2022-11-02 RX ORDER — ACETAMINOPHEN 500 MG
1000 TABLET ORAL ONCE
Status: CANCELLED | OUTPATIENT
Start: 2022-11-16

## 2022-11-02 ASSESSMENT — PAIN SCALES - GENERAL: PAINLEVEL_OUTOF10: 0

## 2022-11-02 NOTE — PRE-PROCEDURE INSTRUCTIONS
On the Day of Your Surgery, Wednesday, November 16, 2022, Please Arrive At 5:45 AM     Enter the hospital through the Main Entrance, take the lobby elevators to the second floor and check in at the Surgery Registration desk. Continue to take your home medications as you normally do up to and including the night before surgery with the exception of blood thinning medications. Blood Thinning Medications:  Please stop prescription blood thinning medications such as Apixaban (Eliquis); Clopidogrel (Plavix); Dabigatran (Pradaxa); Prasugrel (Effient); Rivaroxaban (Xarelto); Ticagrelor (Brilinta); Warfarin (Coumadin) only as directed by your surgeon and/or the prescribing physician    Some common examples of other medications that can thin your blood are: Aspirin, Ibuprofen (Advil, Motrin), Naproxen (Aleve), Meloxicam (Mobic), Celecoxib (Celebrex), Fish Oil, many Herbal Supplements. These medications should usually be stopped at least 7 days prior to surgery. Tylenol is OK to take for pain the week prior to surgery. Failure to stop certain medications may interfere with your scheduled surgery. If you receive instructions from your surgeon regarding what medications to stop prior to surgery, please follow those specific instructions. Please take the following medication(s) the day of surgery with small sips of water:              Celexa      Please follow the showering instructions before surgery handout given to you during pre-admission testing. Additional Instructions:      1. If you are having any type of anesthesia, you are to have NOTHING to eat or drink after midnight the night before surgery. This includes no gum, hard candy, mints or water. The only exception to this is small sips of water to take the medications listed above. No smoking or chewing tobacco after midnight. No alcoholic beverages for 24 hours prior to surgery. 2. Brush your teeth but do not swallow any water.   3. If you wear glasses bring a case for them if you have one. No contacts should be worn the day of surgery. You may also bring your hearing aids. If you have dentures, most surgical procedures involving anesthesia will require that you remove them prior to surgery. 4. If you sleep with a CPAP or BiPAP machine at home and plan on staying in the hospital overnight after surgery, please bring your machine with you. 5. Do not wear any jewelry or body piercings the day of surgery. No nail polish on the operative extremity (arm/hand or leg/foot surgeries)   6. If you are staying overnight with us, you may bring a small bag of necessary personal items. 7. Please wear loose, comfortable clothing. If you are potentially going to have a cast, sling, brace or bulky dressing, make sure to wear clothing that will fit over it. 8. In case of illness - If you have cold or flu like symptoms (high fever, runny nose, sore throat, cough, etc.) rash, nausea, vomiting, loose stools, and/or recent contact with someone who has a contagious disease (chicken pox, measles, COVID-19, etc.). Please call your surgeon before coming to the hospital.    Transportation After Your Surgery/Procedure: If you are going home the same day of surgery you need someone to drive you home. Your  must be at least 25years of age. A taxi cab or other nonmedical public transportation is not acceptable unless you have someone to ride home in the vehicle with you. For your safety, someone must remain with you for the first 24 hours after your surgery if you receive anesthesia or medication. If you do not have someone to stay with you, your procedure may be cancelled. As a patient at [facility] you can expect quality medical and nursing care that is centered on you individual needs. Our goal is to make your surgical experience as comfortable as possible.     Any questions about preparing for your surgery please call [phone].      ____________________________   ____________________________  Signature (Patient)                                 Signature (Nurse)                     Date

## 2022-11-03 LAB
EKG ATRIAL RATE: 56 BPM
EKG P AXIS: 29 DEGREES
EKG P-R INTERVAL: 160 MS
EKG Q-T INTERVAL: 428 MS
EKG QRS DURATION: 80 MS
EKG QTC CALCULATION (BAZETT): 413 MS
EKG R AXIS: 4 DEGREES
EKG T AXIS: 26 DEGREES
EKG VENTRICULAR RATE: 56 BPM
ESTIMATED AVERAGE GLUCOSE: 131 MG/DL
HBA1C MFR BLD: 6.2 % (ref 4–6)

## 2022-11-10 ENCOUNTER — OFFICE VISIT (OUTPATIENT)
Dept: ORTHOPEDIC SURGERY | Age: 47
End: 2022-11-10
Payer: COMMERCIAL

## 2022-11-10 VITALS — BODY MASS INDEX: 31.99 KG/M2 | OXYGEN SATURATION: 100 % | RESPIRATION RATE: 16 BRPM | WEIGHT: 192 LBS | HEIGHT: 65 IN

## 2022-11-10 DIAGNOSIS — Z96.9 RETAINED ORTHOPEDIC HARDWARE: Primary | ICD-10-CM

## 2022-11-10 DIAGNOSIS — M76.71 PERONEAL TENDINITIS OF RIGHT LOWER EXTREMITY: ICD-10-CM

## 2022-11-10 DIAGNOSIS — M19.171 POST-TRAUMATIC ARTHRITIS OF RIGHT ANKLE: Primary | ICD-10-CM

## 2022-11-10 DIAGNOSIS — M19.171 POST-TRAUMATIC ARTHRITIS OF RIGHT ANKLE: ICD-10-CM

## 2022-11-10 PROCEDURE — 99213 OFFICE O/P EST LOW 20 MIN: CPT | Performed by: ORTHOPAEDIC SURGERY

## 2022-11-10 NOTE — H&P (VIEW-ONLY)
[address] [place] [address] AND SPORTS MEDICINE  [facility] hospital [facility]  [address] [address]  Dept: [phone]    Ambulatory Orthopedic Consult      CHIEF COMPLAINT:    Chief Complaint   Patient presents with    Foot Pain     Right         HISTORY OF PRESENT ILLNESS:      The patient is a 49 y.o. female who is being seen for evaluation of the above, which began on 1/6/2022 secondary to a fall down several steps  . At today's visit, she is using a cast shoe. History is obtained today from:   [x]  the patient     [x]  EMR     []  one family member/friend    []  multiple family members/friends    []  other:      INTERVAL HISTORY 1/24/2022:  She is seen again today in the office for follow up of a previous issue (as above). Since being seen last, the patient is doing better. At today's visit, she is not using a brace or assistive device. History is obtained today from:   [x]  the patient     []  EMR     []  one family member/friend    []  multiple family members/friends    []  other:      INTERVAL HISTORY 11/10/2022:  She is seen again today in the office for follow up of a previous issue (as above). Since being seen last, she reports the problem has not significantly improved. At today's visit, she is using no brace/assistive device. She is here today to discuss surgery, and reports that she wishes to proceed with surgery in the near future. She denies any other clinically significant changes in history. History is obtained today from:   [x]  the patient     []  EMR     []  one family member/friend    []  multiple family members/friends    []  other:        REVIEW OF SYSTEMS:  Musculoskeletal: See HPI for pertinent positives     Past Medical History:    She  has a past medical history of Arthritis, COVID-19 (12/2021), Depression, Diabetes ([facility] [address] Hypertension, and Vertigo.      Past Surgical History:    She  has a past surgical history that includes Ankle fracture surgery (Right); Cholecystectomy; Growth plate surgery; and Hysterectomy. Current Medications:     Current Outpatient Medications:     citalopram (CELEXA) 10 MG tablet, Take 20 mg by mouth daily, Disp: , Rfl:      Allergies:    Morphine    Family History:  family history is not on file. Social History:   Social History     Occupational History    Not on file   Tobacco Use    Smoking status: Never    Smokeless tobacco: Never   Vaping Use    Vaping Use: Never used   Substance and Sexual Activity    Alcohol use: Not Currently    Drug use: No    Sexual activity: Not on file     Works full-time at [employer] 12:  Resp 16   Ht 5' 5\" (1.651 m)   Wt 192 lb (87.1 kg)   LMP 06/29/2016 (Approximate)   SpO2 100%   BMI 31.95 kg/m²    Psych: awake, alert  Cardio:  well perfused extremities, no cyanosis  Resp:  normal respiratory effort  Musculoskeletal:    Affected lower extremity:    Vascular: Limb well perfused, compartments soft/compressible. Skin: No erythema/ulcers. Intact. Neurovascular Status:  Grossly neurovascularly intact throughout  Motion:  Grossly able to fire major muscle groups with appropriate/expected AROM  Tenderness to Palpation: fibula hardware and peroneal tendons, anterior ankle joint line  -      RADIOLOGY:   11/10/2022 FINDINGS:  Three weightbearing views (AP, mortise, Lateral) of the right ankle were obtained in the office today and reviewed, revealing no acute fracture, dislocation, or radiopaque foreign body/tumor. Retained hardware in the distal fibula with degenerative changes at the tibiotalar joint with joint space narrowing, sclerosis, and osteophytes. No significant interval change. IMPRESSION: Osseous injury as above.     Electronically signed by Wendy Cao MD        FINDINGS:  Three views (AP, Oblique, Lateral) of the right foot were obtained in the office today and reviewed, revealing minimally displaced fracture at the distal phalanx of the great toe with intra-articular extension, without interval displacement, with interval healing noted. Retained hardware in the distal fibula with degenerative changes at the tibiotalar joint with joint space narrowing, sclerosis, and osteophytes. IMPRESSION: Osseous injury as above. Electronically signed by Lindon Baumgarten, MD      Relevant previous imaging reviewed, both imaging and report(s) as below:    No results found. LABS:   Lab Results   Component Value Date    LABA1C 6.2 (H) 11/02/2022     Lab Results   Component Value Date     11/02/2022         ASSESSMENT AND PLAN:    Body mass index is 31.95 kg/m². She has a history of right ankle pain, with retained hardware and peroneal tendinopathy, as well as some underlying posttraumatic ankle arthritis. She also has a history of a right great toe fracture of the distal phalanx, sustained on 1/6/2022. She has healed well with conservative management. Notably, she has the past medical history as above. She has a history of insulin-dependent diabetes (she now reports that she does not take insulin and her diabetes is in remission, after she had a gastric bypass surgery). We had a discussion today about the likely diagnosis and its natural history, physical exam and imaging findings, as well as various treatment options in detail. Surgically, we have discussed a right ankle removal of hardware, with peroneal tendon exploration and possible tenolysis/debridement/repair/tendon transfer. We have discussed the anticipated postoperative course and relevant weightbearing restrictions/immobilization. She reports that she wishes to proceed with surgery in the near future. The following outside medical records/clearances were reviewed at this visit:  Physical therapy DME eval, PCP Medical clearance, and preop labs. We have discussed the risks of incomplete hardware removal and incomplete pain relief.     Orders/referrals were placed as below at today's visit. We spoke about the risks/benefits/alternatives to a surgical intervention. They understand that the risks of surgery may include but are not limited to pain, infection, bleeding, blood clot, damage to soft tissue/vessel/nerve, future surgery, scarring/stiffness, decreased strength/weakness, cosmetic deformity, neuroma/neuritis/phantom pains, delayed soft tissue/bone healing, nonunion, malunion, failure of hardware/fixation/surgery, iatrogenic fracture/dislocation, damage to bone/joint(s), worsening of condition, recurrence, limb length discrepancy, avascular necrosis of bone, neurovascular compromise/compartment syndrome, tourniquet complications, failure of surgery, dissatisfaction with outcome, loss of limb, stroke, heart attack, pulmonary embolus, mental status change, anesthesia risks/reaction, and even death. They expressed verbal understanding of the risks and wish to proceed with surgical intervention. All questions were answered. No guarantees were made or implied. Informed consent was obtained. We also had a discussion about the risk of blood clot and thromboembolic events. The patient understands that there is an increased risk with surgery/immobilization, and understands nothing will completely eliminate the risk of DVT/PE's, and that any prophylactic medication does not substitute for early mobilization. Given her risk profile, I have recommended the following strategy to decrease the risk of blood clots, and the patient agrees and wishes to proceed:  Aspirin 325 mg PO q Day. All questions were answered and the patient agrees with the above plan. The patient will return to clinic postoperatively.                           At the patient's next visit, depending on how the patient is doing and/or new imaging/labs results, we may consider the following options:    []  Lace up ankle     []  CAM boot         []  removable wrist brace     []  PT:        [] Wean out immobilization         []  Adv activity      []  Footmind        []  Spenco       []  Custom Orthotic:               []  AZ brace                    []  Rocker Bottom      []  Night splint    []  Heel cups        []  Strap        []  Toe gizmos    []  Topl        []  NSAIDs         []  Sahil        []  Ref:         []  Stress Xray    []  CT        []  MRI  []  Inj:          []  Consider OR      []  Pick OR date    No follow-ups on file. No orders of the defined types were placed in this encounter. No orders of the defined types were placed in this encounter. Nubia Schneider MD  Orthopedic Surgery        Please excuse any typos/errors, as this note was created with the assistance of voice recognition software. While intending to generate a document that actually reflects the content of the visit, the document can still have some errors including those of syntax and sound-a-like substitutions which may escape proof reading. In such instances, actual meaning can be extrapolated by context.

## 2022-11-10 NOTE — PROGRESS NOTES
[address] [place] [address] AND SPORTS MEDICINE  [address] [address]  Dept: [phone]    Ambulatory Orthopedic Consult      CHIEF COMPLAINT:    Chief Complaint   Patient presents with    Foot Pain     Right         HISTORY OF PRESENT ILLNESS:      The patient is a 49 y.o. female who is being seen for evaluation of the above, which began on 1/6/2022 secondary to a fall down several steps  . At today's visit, she is using a cast shoe. History is obtained today from:   [x]  the patient     [x]  EMR     []  one family member/friend    []  multiple family members/friends    []  other:      INTERVAL HISTORY 1/24/2022:  She is seen again today in the office for follow up of a previous issue (as above). Since being seen last, the patient is doing better. At today's visit, she is not using a brace or assistive device. History is obtained today from:   [x]  the patient     []  EMR     []  one family member/friend    []  multiple family members/friends    []  other:      INTERVAL HISTORY 11/10/2022:  She is seen again today in the office for follow up of a previous issue (as above). Since being seen last, she reports the problem has not significantly improved. At today's visit, she is using no brace/assistive device. She is here today to discuss surgery, and reports that she wishes to proceed with surgery in the near future. She denies any other clinically significant changes in history. History is obtained today from:   [x]  the patient     []  EMR     []  one family member/friend    []  multiple family members/friends    []  other:        REVIEW OF SYSTEMS:  Musculoskeletal: See HPI for pertinent positives     Past Medical History:    She  has a past medical history of Arthritis, COVID-19 (12/2021), Depression, Diabetes ([facility] [address] Hypertension, and Vertigo.      Past Surgical History:    She  has a past surgical history that includes Ankle fracture surgery (Right); Cholecystectomy; Growth plate surgery; and Hysterectomy. Current Medications:     Current Outpatient Medications:     citalopram (CELEXA) 10 MG tablet, Take 20 mg by mouth daily, Disp: , Rfl:      Allergies:    Morphine    Family History:  family history is not on file. Social History:   Social History     Occupational History    Not on file   Tobacco Use    Smoking status: Never    Smokeless tobacco: Never   Vaping Use    Vaping Use: Never used   Substance and Sexual Activity    Alcohol use: Not Currently    Drug use: No    Sexual activity: Not on file     Works full-time at [employer] 12:  Resp 16   Ht 5' 5\" (1.651 m)   Wt 192 lb (87.1 kg)   LMP 06/29/2016 (Approximate)   SpO2 100%   BMI 31.95 kg/m²    Psych: awake, alert  Cardio:  well perfused extremities, no cyanosis  Resp:  normal respiratory effort  Musculoskeletal:    Affected lower extremity:    Vascular: Limb well perfused, compartments soft/compressible. Skin: No erythema/ulcers. Intact. Neurovascular Status:  Grossly neurovascularly intact throughout  Motion:  Grossly able to fire major muscle groups with appropriate/expected AROM  Tenderness to Palpation: fibula hardware and peroneal tendons, anterior ankle joint line  -      RADIOLOGY:   11/10/2022 FINDINGS:  Three weightbearing views (AP, mortise, Lateral) of the right ankle were obtained in the office today and reviewed, revealing no acute fracture, dislocation, or radiopaque foreign body/tumor. Retained hardware in the distal fibula with degenerative changes at the tibiotalar joint with joint space narrowing, sclerosis, and osteophytes. No significant interval change. IMPRESSION: Osseous injury as above.     Electronically signed by Mayank Corrales MD        FINDINGS:  Three views (AP, Oblique, Lateral) of the right foot were obtained in the office today and reviewed, revealing minimally displaced fracture at the distal phalanx of the great toe with intra-articular extension, without interval displacement, with interval healing noted. Retained hardware in the distal fibula with degenerative changes at the tibiotalar joint with joint space narrowing, sclerosis, and osteophytes. IMPRESSION: Osseous injury as above. Electronically signed by Louisa Mercer MD      Relevant previous imaging reviewed, both imaging and report(s) as below:    No results found. LABS:   Lab Results   Component Value Date    LABA1C 6.2 (H) 11/02/2022     Lab Results   Component Value Date     11/02/2022         ASSESSMENT AND PLAN:    Body mass index is 31.95 kg/m². She has a history of right ankle pain, with retained hardware and peroneal tendinopathy, as well as some underlying posttraumatic ankle arthritis. She also has a history of a right great toe fracture of the distal phalanx, sustained on 1/6/2022. She has healed well with conservative management. Notably, she has the past medical history as above. She has a history of insulin-dependent diabetes (she now reports that she does not take insulin and her diabetes is in remission, after she had a gastric bypass surgery). We had a discussion today about the likely diagnosis and its natural history, physical exam and imaging findings, as well as various treatment options in detail. Surgically, we have discussed a right ankle removal of hardware, with peroneal tendon exploration and possible tenolysis/debridement/repair/tendon transfer. We have discussed the anticipated postoperative course and relevant weightbearing restrictions/immobilization. She reports that she wishes to proceed with surgery in the near future. The following outside medical records/clearances were reviewed at this visit:  Physical therapy DME eval, PCP Medical clearance, and preop labs. We have discussed the risks of incomplete hardware removal and incomplete pain relief.     Orders/referrals were placed as below at today's visit. We spoke about the risks/benefits/alternatives to a surgical intervention. They understand that the risks of surgery may include but are not limited to pain, infection, bleeding, blood clot, damage to soft tissue/vessel/nerve, future surgery, scarring/stiffness, decreased strength/weakness, cosmetic deformity, neuroma/neuritis/phantom pains, delayed soft tissue/bone healing, nonunion, malunion, failure of hardware/fixation/surgery, iatrogenic fracture/dislocation, damage to bone/joint(s), worsening of condition, recurrence, limb length discrepancy, avascular necrosis of bone, neurovascular compromise/compartment syndrome, tourniquet complications, failure of surgery, dissatisfaction with outcome, loss of limb, stroke, heart attack, pulmonary embolus, mental status change, anesthesia risks/reaction, and even death. They expressed verbal understanding of the risks and wish to proceed with surgical intervention. All questions were answered. No guarantees were made or implied. Informed consent was obtained. We also had a discussion about the risk of blood clot and thromboembolic events. The patient understands that there is an increased risk with surgery/immobilization, and understands nothing will completely eliminate the risk of DVT/PE's, and that any prophylactic medication does not substitute for early mobilization. Given her risk profile, I have recommended the following strategy to decrease the risk of blood clots, and the patient agrees and wishes to proceed:  Aspirin 325 mg PO q Day. All questions were answered and the patient agrees with the above plan. The patient will return to clinic postoperatively.                           At the patient's next visit, depending on how the patient is doing and/or new imaging/labs results, we may consider the following options:    []  Lace up ankle     []  CAM boot         []  removable wrist brace     []  PT:        [] Wean out immobilization         []  Adv activity      []  Footmind        []  Spenco       []  Custom Orthotic:               []  AZ brace                    []  Rocker Bottom      []  Night splint    []  Heel cups        []  Strap        []  Toe gizmos    []  Topl        []  NSAIDs         []  Sahil        []  Ref:         []  Stress Xray    []  CT        []  MRI  []  Inj:          []  Consider OR      []  Pick OR date    No follow-ups on file. No orders of the defined types were placed in this encounter. No orders of the defined types were placed in this encounter. Darshana Rivera MD  Orthopedic Surgery        Please excuse any typos/errors, as this note was created with the assistance of voice recognition software. While intending to generate a document that actually reflects the content of the visit, the document can still have some errors including those of syntax and sound-a-like substitutions which may escape proof reading. In such instances, actual meaning can be extrapolated by context.

## 2022-11-14 ENCOUNTER — ANESTHESIA EVENT (OUTPATIENT)
Dept: OPERATING ROOM | Age: 47
End: 2022-11-14
Payer: COMMERCIAL

## 2022-11-15 NOTE — H&P
Interval H&P Note    Pt Name: Negrito Hurst  MRN: [id number]  YOB: 1975  Date of evaluation: 11/16/2022      [x] I have reviewed orthopedic note attached below   by dr woodward dated 11/10/22 for an Interval History and Physical note. [x] I have examined  Negrito Hurst  There are no changes to the patient who is scheduled for RIGHT FOOT HARDWARE REMOVAL WITH PERONEAL EXPLORATION - SYNTHES by Wendy Cao MD for Closed nondisplaced fracture of distal phalanx of right great toe with routine healing, subsequent encounter [R20.306J]. pt has right foot/toe pain denies numbness tingling decreased range of motionThe patient denies new health changes, fever, chills, wheezing, cough, increased SOB, chest pain, open sores or wounds. Vital signs: /86   Pulse 62   Temp 97.1 °F (36.2 °C) (Temporal)   Resp 18   Ht 5' 5\" (1.651 m)   Wt 192 lb (87.1 kg)   LMP 06/29/2016 (Approximate)   SpO2 96%   BMI 31.95 kg/m²     Allergies:  Morphine    The pt does  not have  DM  The pt is not on anticoagulation medications    Medications:    Prior to Admission medications    Medication Sig Start Date End Date Taking? Authorizing Provider   Handicap Placard MISC by Does not apply route 11/15/2022-2/16/2023 11/10/22   Wendy Cao MD   citalopram (CELEXA) 10 MG tablet Take 20 mg by mouth daily    Historical Provider, MD         Review of Systems:   Pertinent findings in the HPI above. A comprehensive review of systems was essentially negative  Denies or behavioral/psych issues. Patient's last menstrual period was 06/29/2016 (approximate). No obstetric history on file. This is a 49 y.o. female who is pleasant, cooperative, alert and oriented x3, in no acute distress. Heart: Heart sounds are normal.  HR 62 regular rate and rhythm without murmur, gallop or rub.  Carotid bruits not heard  Lungs: Normal respiratory effort with equal expansion, good air exchange, unlabored and clear to auscultation without wheezes or rales bilaterally   Abdomen: soft, nontender, nondistended with bowel sounds . Extremities no calf tenderness or edema pedal pulses palpable    Labs:  Recent Labs     11/02/22  1444   HGB 11.9   HCT 37.7   WBC 5.0   MCV 87.5         K 4.5      CO2 27   BUN 12   CREATININE 0.64   GLUCOSE 132*       No results for input(s): COVID19 in the last 720 hours. BENNY Wiseman CNP  Electronically signed 11/16/2022 at 6:28 AM     [address] [facility]  [place] [address]  Dept: [phone]     Ambulatory Orthopedic Consult        CHIEF COMPLAINT:         Chief Complaint   Patient presents with    Foot Pain       Right            HISTORY OF PRESENT ILLNESS:       The patient is a 49 y.o. female who is being seen for evaluation of the above, which began on 1/6/2022 secondary to a fall down several steps  . At today's visit, she is using a cast shoe. History is obtained today from:   [x]  the patient     [x]  EMR     []  one family member/friend    []  multiple family members/friends    []  other:       INTERVAL HISTORY 1/24/2022:  She is seen again today in the office for follow up of a previous issue (as above). Since being seen last, the patient is doing better. At today's visit, she is not using a brace or assistive device. History is obtained today from:   [x]  the patient     []  EMR     []  one family member/friend    []  multiple family members/friends    []  other:       INTERVAL HISTORY 11/10/2022:  She is seen again today in the office for follow up of a previous issue (as above). Since being seen last, she reports the problem has not significantly improved. At today's visit, she is using no brace/assistive device. She is here today to discuss surgery, and reports that she wishes to proceed with surgery in the near future.  She denies any other clinically significant changes in history. History is obtained today from:   [x]  the patient     []  EMR     []  one family member/friend    []  multiple family members/friends    []  other:          REVIEW OF SYSTEMS:  Musculoskeletal: See HPI for pertinent positives     Past Medical History:    She   Past Medical History in prose (no negatives)    has a past medical history of Arthritis, COVID-19 (12/2021), Depression, Diabetes ([facility] [address] Hypertension, and Vertigo. Past Surgical History:    She  has a past surgical history that includes Ankle fracture surgery (Right); Cholecystectomy; Growth plate surgery; and Hysterectomy. Current Medications:     Current Medication      Current Outpatient Medications:     citalopram (CELEXA) 10 MG tablet, Take 20 mg by mouth daily, Disp: , Rfl:          Allergies:    Morphine     Family History:  family history is not on file. Social History:   Social History           Occupational History    Not on file   Tobacco Use    Smoking status: Never    Smokeless tobacco: Never   Vaping Use    Vaping Use: Never used   Substance and Sexual Activity    Alcohol use: Not Currently    Drug use: No    Sexual activity: Not on file      Works full-time at [address]:  Resp 16   Ht 5' 5\" (1.651 m)   Wt 192 lb (87.1 kg)   LMP 06/29/2016 (Approximate)   SpO2 100%   BMI 31.95 kg/m²    Psych: awake, alert  Cardio:  well perfused extremities, no cyanosis  Resp:  normal respiratory effort  Musculoskeletal:    Affected lower extremity:    Vascular: Limb well perfused, compartments soft/compressible. Skin: No erythema/ulcers. Intact.    Neurovascular Status:  Grossly neurovascularly intact throughout  Motion:  Grossly able to fire major muscle groups with appropriate/expected AROM  Tenderness to Palpation: fibula hardware and peroneal tendons, anterior ankle joint line  -        RADIOLOGY:   11/10/2022 FINDINGS:  Three weightbearing views (AP, mortise, Lateral) of the right ankle were obtained in the office today and reviewed, revealing no acute fracture, dislocation, or radiopaque foreign body/tumor. Retained hardware in the distal fibula with degenerative changes at the tibiotalar joint with joint space narrowing, sclerosis, and osteophytes. No significant interval change. IMPRESSION: Osseous injury as above. Electronically signed by Mat Veliz MD           FINDINGS:  Three views (AP, Oblique, Lateral) of the right foot were obtained in the office today and reviewed, revealing minimally displaced fracture at the distal phalanx of the great toe with intra-articular extension, without interval displacement, with interval healing noted. Retained hardware in the distal fibula with degenerative changes at the tibiotalar joint with joint space narrowing, sclerosis, and osteophytes. IMPRESSION: Osseous injury as above. Electronically signed by Mat Veliz MD        Relevant previous imaging reviewed, both imaging and report(s) as below:    No results found. LABS:         Lab Results   Component Value Date     LABA1C 6.2 (H) 11/02/2022            Lab Results   Component Value Date      11/02/2022            ASSESSMENT AND PLAN:    Body mass index is 31.95 kg/m². She has a history of right ankle pain, with retained hardware and peroneal tendinopathy, as well as some underlying posttraumatic ankle arthritis. She also has a history of a right great toe fracture of the distal phalanx, sustained on 1/6/2022. She has healed well with conservative management. Notably, she has the past medical history as above. She has a history of insulin-dependent diabetes (she now reports that she does not take insulin and her diabetes is in remission, after she had a gastric bypass surgery).      We had a discussion today about the likely diagnosis and its natural history, physical exam and imaging findings, as well as various treatment options in detail. Surgically, we have discussed a right ankle removal of hardware, with peroneal tendon exploration and possible tenolysis/debridement/repair/tendon transfer. We have discussed the anticipated postoperative course and relevant weightbearing restrictions/immobilization. She reports that she wishes to proceed with surgery in the near future. The following outside medical records/clearances were reviewed at this visit:  Physical therapy DME eval, PCP Medical clearance, and preop labs. We have discussed the risks of incomplete hardware removal and incomplete pain relief. Orders/referrals were placed as below at today's visit. We spoke about the risks/benefits/alternatives to a surgical intervention. They understand that the risks of surgery may include but are not limited to pain, infection, bleeding, blood clot, damage to soft tissue/vessel/nerve, future surgery, scarring/stiffness, decreased strength/weakness, cosmetic deformity, neuroma/neuritis/phantom pains, delayed soft tissue/bone healing, nonunion, malunion, failure of hardware/fixation/surgery, iatrogenic fracture/dislocation, damage to bone/joint(s), worsening of condition, recurrence, limb length discrepancy, avascular necrosis of bone, neurovascular compromise/compartment syndrome, tourniquet complications, failure of surgery, dissatisfaction with outcome, loss of limb, stroke, heart attack, pulmonary embolus, mental status change, anesthesia risks/reaction, and even death. They expressed verbal understanding of the risks and wish to proceed with surgical intervention. All questions were answered. No guarantees were made or implied. Informed consent was obtained. We also had a discussion about the risk of blood clot and thromboembolic events.  The patient understands that there is an increased risk with surgery/immobilization, and understands nothing will completely eliminate the risk of DVT/PE's, and that any prophylactic medication does not substitute for early mobilization. Given her risk profile, I have recommended the following strategy to decrease the risk of blood clots, and the patient agrees and wishes to proceed:  Aspirin 325 mg PO q Day. All questions were answered and the patient agrees with the above plan. The patient will return to clinic postoperatively. At the patient's next visit, depending on how the patient is doing and/or new imaging/labs results, we may consider the following options:    []  Lace up ankle     []  CAM boot         []  removable wrist brace     []  PT:        []  Wean out immobilization         []  Adv activity       []  Footmind        []  Spenco       []  Custom Orthotic:               []  AZ brace                    []  Rocker Bottom       []  Night splint    []  Heel cups        []  Strap        []  Toe gizmos    []  Topl        []  NSAIDs         []  Sahil        []  Ref:         []  Stress Xray    []  CT        []  MRI          []  Inj:           []  Consider OR      []  Pick OR date     No follow-ups on file. Encounter Medications    No orders of the defined types were placed in this encounter. No orders of the defined types were placed in this encounter.            Lilliam Rose MD  Orthopedic Surgery

## 2022-11-16 ENCOUNTER — HOSPITAL ENCOUNTER (OUTPATIENT)
Age: 47
Setting detail: OUTPATIENT SURGERY
Discharge: HOME | End: 2022-11-16
Attending: ORTHOPAEDIC SURGERY | Admitting: ORTHOPAEDIC SURGERY
Payer: COMMERCIAL

## 2022-11-16 ENCOUNTER — APPOINTMENT (OUTPATIENT)
Dept: GENERAL RADIOLOGY | Age: 47
End: 2022-11-16
Attending: ORTHOPAEDIC SURGERY
Payer: COMMERCIAL

## 2022-11-16 ENCOUNTER — ANESTHESIA (OUTPATIENT)
Dept: OPERATING ROOM | Age: 47
End: 2022-11-16
Payer: COMMERCIAL

## 2022-11-16 VITALS
RESPIRATION RATE: 13 BRPM | SYSTOLIC BLOOD PRESSURE: 145 MMHG | HEART RATE: 46 BPM | BODY MASS INDEX: 31.99 KG/M2 | TEMPERATURE: 96.8 F | OXYGEN SATURATION: 100 % | WEIGHT: 192 LBS | HEIGHT: 65 IN | DIASTOLIC BLOOD PRESSURE: 75 MMHG

## 2022-11-16 DIAGNOSIS — Z96.9 RETAINED ORTHOPEDIC HARDWARE: Primary | ICD-10-CM

## 2022-11-16 PROCEDURE — 3700000001 HC ADD 15 MINUTES (ANESTHESIA): Performed by: ORTHOPAEDIC SURGERY

## 2022-11-16 PROCEDURE — 6360000002 HC RX W HCPCS: Performed by: ANESTHESIOLOGY

## 2022-11-16 PROCEDURE — 6360000002 HC RX W HCPCS: Performed by: NURSE ANESTHETIST, CERTIFIED REGISTERED

## 2022-11-16 PROCEDURE — 2500000003 HC RX 250 WO HCPCS: Performed by: NURSE ANESTHETIST, CERTIFIED REGISTERED

## 2022-11-16 PROCEDURE — 7100000011 HC PHASE II RECOVERY - ADDTL 15 MIN: Performed by: ORTHOPAEDIC SURGERY

## 2022-11-16 PROCEDURE — 6370000000 HC RX 637 (ALT 250 FOR IP): Performed by: ANESTHESIOLOGY

## 2022-11-16 PROCEDURE — 6370000000 HC RX 637 (ALT 250 FOR IP): Performed by: ORTHOPAEDIC SURGERY

## 2022-11-16 PROCEDURE — 6360000002 HC RX W HCPCS: Performed by: ORTHOPAEDIC SURGERY

## 2022-11-16 PROCEDURE — 2709999900 HC NON-CHARGEABLE SUPPLY: Performed by: ORTHOPAEDIC SURGERY

## 2022-11-16 PROCEDURE — 7100000010 HC PHASE II RECOVERY - FIRST 15 MIN: Performed by: ORTHOPAEDIC SURGERY

## 2022-11-16 PROCEDURE — 7100000000 HC PACU RECOVERY - FIRST 15 MIN: Performed by: ORTHOPAEDIC SURGERY

## 2022-11-16 PROCEDURE — 3700000000 HC ANESTHESIA ATTENDED CARE: Performed by: ORTHOPAEDIC SURGERY

## 2022-11-16 PROCEDURE — 20680 REMOVAL OF IMPLANT DEEP: CPT | Performed by: ORTHOPAEDIC SURGERY

## 2022-11-16 PROCEDURE — 3600000012 HC SURGERY LEVEL 2 ADDTL 15MIN: Performed by: ORTHOPAEDIC SURGERY

## 2022-11-16 PROCEDURE — 27680 RELEASE OF LOWER LEG TENDON: CPT | Performed by: ORTHOPAEDIC SURGERY

## 2022-11-16 PROCEDURE — 2580000003 HC RX 258: Performed by: ANESTHESIOLOGY

## 2022-11-16 PROCEDURE — 3600000002 HC SURGERY LEVEL 2 BASE: Performed by: ORTHOPAEDIC SURGERY

## 2022-11-16 PROCEDURE — 64445 NJX AA&/STRD SCIATIC NRV IMG: CPT | Performed by: ANESTHESIOLOGY

## 2022-11-16 PROCEDURE — 3209999900 FLUORO FOR SURGICAL PROCEDURES

## 2022-11-16 PROCEDURE — 7100000001 HC PACU RECOVERY - ADDTL 15 MIN: Performed by: ORTHOPAEDIC SURGERY

## 2022-11-16 RX ORDER — SODIUM CHLORIDE 0.9 % (FLUSH) 0.9 %
5-40 SYRINGE (ML) INJECTION EVERY 12 HOURS SCHEDULED
Status: DISCONTINUED | OUTPATIENT
Start: 2022-11-16 | End: 2022-11-16 | Stop reason: HOSPADM

## 2022-11-16 RX ORDER — PROPOFOL 10 MG/ML
INJECTION, EMULSION INTRAVENOUS PRN
Status: DISCONTINUED | OUTPATIENT
Start: 2022-11-16 | End: 2022-11-16 | Stop reason: SDUPTHER

## 2022-11-16 RX ORDER — LIDOCAINE HYDROCHLORIDE 10 MG/ML
1 INJECTION, SOLUTION EPIDURAL; INFILTRATION; INTRACAUDAL; PERINEURAL
Status: DISCONTINUED | OUTPATIENT
Start: 2022-11-17 | End: 2022-11-16 | Stop reason: HOSPADM

## 2022-11-16 RX ORDER — SODIUM CHLORIDE 9 MG/ML
INJECTION, SOLUTION INTRAVENOUS CONTINUOUS
Status: DISCONTINUED | OUTPATIENT
Start: 2022-11-17 | End: 2022-11-16 | Stop reason: HOSPADM

## 2022-11-16 RX ORDER — MIDAZOLAM HYDROCHLORIDE 1 MG/ML
2 INJECTION INTRAMUSCULAR; INTRAVENOUS ONCE
Status: COMPLETED | OUTPATIENT
Start: 2022-11-16 | End: 2022-11-16

## 2022-11-16 RX ORDER — OXYCODONE HYDROCHLORIDE AND ACETAMINOPHEN 5; 325 MG/1; MG/1
1 TABLET ORAL EVERY 6 HOURS PRN
Qty: 20 TABLET | Refills: 0 | Status: SHIPPED | OUTPATIENT
Start: 2022-11-16 | End: 2022-11-23

## 2022-11-16 RX ORDER — ACETAMINOPHEN 500 MG
1000 TABLET ORAL ONCE
Status: COMPLETED | OUTPATIENT
Start: 2022-11-16 | End: 2022-11-16

## 2022-11-16 RX ORDER — ROPIVACAINE HYDROCHLORIDE 5 MG/ML
INJECTION, SOLUTION EPIDURAL; INFILTRATION; PERINEURAL
Status: COMPLETED | OUTPATIENT
Start: 2022-11-16 | End: 2022-11-16

## 2022-11-16 RX ORDER — DIPHENHYDRAMINE HYDROCHLORIDE 50 MG/ML
12.5 INJECTION INTRAMUSCULAR; INTRAVENOUS
Status: DISCONTINUED | OUTPATIENT
Start: 2022-11-16 | End: 2022-11-16 | Stop reason: HOSPADM

## 2022-11-16 RX ORDER — SODIUM CHLORIDE 0.9 % (FLUSH) 0.9 %
5-40 SYRINGE (ML) INJECTION PRN
Status: DISCONTINUED | OUTPATIENT
Start: 2022-11-16 | End: 2022-11-16 | Stop reason: HOSPADM

## 2022-11-16 RX ORDER — GINSENG 100 MG
CAPSULE ORAL PRN
Status: DISCONTINUED | OUTPATIENT
Start: 2022-11-16 | End: 2022-11-16 | Stop reason: ALTCHOICE

## 2022-11-16 RX ORDER — OXYCODONE HYDROCHLORIDE 5 MG/1
2.5 TABLET ORAL PRN
Status: DISCONTINUED | OUTPATIENT
Start: 2022-11-16 | End: 2022-11-16 | Stop reason: HOSPADM

## 2022-11-16 RX ORDER — OXYCODONE HYDROCHLORIDE 5 MG/1
5 TABLET ORAL PRN
Status: DISCONTINUED | OUTPATIENT
Start: 2022-11-16 | End: 2022-11-16 | Stop reason: HOSPADM

## 2022-11-16 RX ORDER — KETOROLAC TROMETHAMINE 30 MG/ML
INJECTION, SOLUTION INTRAMUSCULAR; INTRAVENOUS PRN
Status: DISCONTINUED | OUTPATIENT
Start: 2022-11-16 | End: 2022-11-16 | Stop reason: SDUPTHER

## 2022-11-16 RX ORDER — SODIUM CHLORIDE 9 MG/ML
INJECTION, SOLUTION INTRAVENOUS PRN
Status: DISCONTINUED | OUTPATIENT
Start: 2022-11-16 | End: 2022-11-16 | Stop reason: HOSPADM

## 2022-11-16 RX ORDER — DEXAMETHASONE SODIUM PHOSPHATE 10 MG/ML
INJECTION, SOLUTION INTRAMUSCULAR; INTRAVENOUS PRN
Status: DISCONTINUED | OUTPATIENT
Start: 2022-11-16 | End: 2022-11-16 | Stop reason: SDUPTHER

## 2022-11-16 RX ORDER — ASPIRIN 325 MG
325 TABLET, DELAYED RELEASE (ENTERIC COATED) ORAL DAILY
Qty: 42 TABLET | Refills: 0 | Status: SHIPPED | OUTPATIENT
Start: 2022-11-16 | End: 2022-12-28

## 2022-11-16 RX ORDER — ONDANSETRON 4 MG/1
4 TABLET, FILM COATED ORAL EVERY 8 HOURS PRN
Qty: 20 TABLET | Refills: 0 | Status: SHIPPED | OUTPATIENT
Start: 2022-11-16 | End: 2022-11-23

## 2022-11-16 RX ORDER — HYDROMORPHONE HYDROCHLORIDE 1 MG/ML
0.25 INJECTION, SOLUTION INTRAMUSCULAR; INTRAVENOUS; SUBCUTANEOUS EVERY 5 MIN PRN
Status: DISCONTINUED | OUTPATIENT
Start: 2022-11-16 | End: 2022-11-16 | Stop reason: HOSPADM

## 2022-11-16 RX ORDER — LIDOCAINE HYDROCHLORIDE 20 MG/ML
INJECTION, SOLUTION EPIDURAL; INFILTRATION; INTRACAUDAL; PERINEURAL PRN
Status: DISCONTINUED | OUTPATIENT
Start: 2022-11-16 | End: 2022-11-16 | Stop reason: SDUPTHER

## 2022-11-16 RX ORDER — SODIUM CHLORIDE, SODIUM LACTATE, POTASSIUM CHLORIDE, CALCIUM CHLORIDE 600; 310; 30; 20 MG/100ML; MG/100ML; MG/100ML; MG/100ML
INJECTION, SOLUTION INTRAVENOUS CONTINUOUS
Status: DISCONTINUED | OUTPATIENT
Start: 2022-11-17 | End: 2022-11-16 | Stop reason: HOSPADM

## 2022-11-16 RX ORDER — ONDANSETRON 2 MG/ML
INJECTION INTRAMUSCULAR; INTRAVENOUS PRN
Status: DISCONTINUED | OUTPATIENT
Start: 2022-11-16 | End: 2022-11-16 | Stop reason: SDUPTHER

## 2022-11-16 RX ORDER — DOCUSATE SODIUM 100 MG/1
100 CAPSULE, LIQUID FILLED ORAL 2 TIMES DAILY PRN
Qty: 20 CAPSULE | Refills: 0 | Status: SHIPPED | OUTPATIENT
Start: 2022-11-16 | End: 2022-11-23

## 2022-11-16 RX ORDER — HYDROMORPHONE HYDROCHLORIDE 1 MG/ML
0.5 INJECTION, SOLUTION INTRAMUSCULAR; INTRAVENOUS; SUBCUTANEOUS EVERY 5 MIN PRN
Status: DISCONTINUED | OUTPATIENT
Start: 2022-11-16 | End: 2022-11-16 | Stop reason: HOSPADM

## 2022-11-16 RX ORDER — FENTANYL CITRATE 50 UG/ML
INJECTION, SOLUTION INTRAMUSCULAR; INTRAVENOUS PRN
Status: DISCONTINUED | OUTPATIENT
Start: 2022-11-16 | End: 2022-11-16 | Stop reason: SDUPTHER

## 2022-11-16 RX ORDER — SULFAMETHOXAZOLE AND TRIMETHOPRIM 800; 160 MG/1; MG/1
1 TABLET ORAL 2 TIMES DAILY
Qty: 10 TABLET | Refills: 0 | Status: SHIPPED | OUTPATIENT
Start: 2022-11-16 | End: 2022-11-21

## 2022-11-16 RX ORDER — ONDANSETRON 2 MG/ML
4 INJECTION INTRAMUSCULAR; INTRAVENOUS
Status: DISCONTINUED | OUTPATIENT
Start: 2022-11-16 | End: 2022-11-16 | Stop reason: HOSPADM

## 2022-11-16 RX ADMIN — LIDOCAINE HYDROCHLORIDE 80 MG: 20 INJECTION, SOLUTION EPIDURAL; INFILTRATION; INTRACAUDAL; PERINEURAL at 07:26

## 2022-11-16 RX ADMIN — DEXAMETHASONE SODIUM PHOSPHATE 4 MG: 10 INJECTION INTRAMUSCULAR; INTRAVENOUS at 07:38

## 2022-11-16 RX ADMIN — MIDAZOLAM 2 MG: 1 INJECTION INTRAMUSCULAR; INTRAVENOUS at 07:08

## 2022-11-16 RX ADMIN — SODIUM CHLORIDE, POTASSIUM CHLORIDE, SODIUM LACTATE AND CALCIUM CHLORIDE: 600; 310; 30; 20 INJECTION, SOLUTION INTRAVENOUS at 06:28

## 2022-11-16 RX ADMIN — ONDANSETRON 4 MG: 2 INJECTION INTRAMUSCULAR; INTRAVENOUS at 08:03

## 2022-11-16 RX ADMIN — ACETAMINOPHEN 1000 MG: 500 TABLET ORAL at 07:00

## 2022-11-16 RX ADMIN — KETOROLAC TROMETHAMINE 30 MG: 30 INJECTION, SOLUTION INTRAMUSCULAR; INTRAVENOUS at 08:04

## 2022-11-16 RX ADMIN — ROPIVACAINE HYDROCHLORIDE 30 ML: 5 INJECTION, SOLUTION EPIDURAL; INFILTRATION; PERINEURAL at 07:05

## 2022-11-16 RX ADMIN — CEFAZOLIN SODIUM 2000 MG: 10 INJECTION, POWDER, FOR SOLUTION INTRAVENOUS at 07:34

## 2022-11-16 RX ADMIN — Medication 50 MCG: at 07:26

## 2022-11-16 RX ADMIN — PROPOFOL 200 MG: 10 INJECTION, EMULSION INTRAVENOUS at 07:26

## 2022-11-16 ASSESSMENT — PAIN SCALES - GENERAL: PAINLEVEL_OUTOF10: 0

## 2022-11-16 ASSESSMENT — PAIN - FUNCTIONAL ASSESSMENT: PAIN_FUNCTIONAL_ASSESSMENT: 0-10

## 2022-11-16 NOTE — ANESTHESIA PROCEDURE NOTES
Peripheral Block    Patient location during procedure: pre-op  Reason for block: post-op pain management and at surgeon's request  Start time: 11/16/2022 7:05 AM  End time: 11/16/2022 7:10 AM  Staffing  Performed: anesthesiologist   Anesthesiologist: Madelaine Poon MD  Preanesthetic Checklist  Completed: patient identified, IV checked, site marked, risks and benefits discussed, surgical/procedural consents, equipment checked, pre-op evaluation, timeout performed, anesthesia consent given, oxygen available, monitors applied/VS acknowledged, fire risk safety assessment completed and verbalized and blood product R/B/A discussed and consented  Peripheral Block   Patient position: supine  Prep: ChloraPrep  Provider prep: mask and sterile gloves  Patient monitoring: cardiac monitor, continuous pulse ox, frequent blood pressure checks and IV access  Block type: Sciatic  Popliteal  Laterality: right  Injection technique: single-shot  Guidance: ultrasound guided  Local infiltration: lidocaine  Infiltration strength: 1 %  Local infiltration: lidocaine  Dose: 3 mL    Needle   Needle type: insulated echogenic nerve stimulator needle   Needle gauge: 20 G  Needle localization: ultrasound guidance  Needle length: 10 cm  Assessment   Injection assessment: negative aspiration for heme, no paresthesia on injection, local visualized surrounding nerve on ultrasound and no intravascular symptoms  Paresthesia pain: none  Slow fractionated injection: yes  Hemodynamics: stable  Real-time US image taken/store: yes  Outcomes: patient tolerated procedure well and uncomplicated    Additional Notes  U/S [id number].  Time out performed.          Medications Administered  ropivacaine (NAROPIN) injection 0.5% - Perineural   30 mL - 11/16/2022 7:05:00 AM

## 2022-11-16 NOTE — DISCHARGE INSTRUCTIONS
[facility] OR  [facility] [address] [address]  Dept: [phone]  Loc: [phone]    Dr. Aruna Xie Post Operative Instructions (Lower Extremity)      Fluids and Diet:    Begin with clear liquids, broth, dry toast, and crackers. If not nauseated, then resume your regular pre-operative diet when you feel ready. Medications: Take your prescriptions as directed. You may resume your regularly scheduled medications (unless otherwise directed). If any side effects or adverse reactions occur, discontinue the medication and contact your doctor. Review the patient drug information that is provided before you take any medication. If you have a fracture or a surgery that involves placing hardware (screws, plates, joint replacement), avoid the routine use of NSAIDs for about 6 months if possible (due to a risk of delayed bone healing). Ambulation and Activity:    You are advised to go directly home from the hospital.  You may not put any weight on the operative foot (unless otherwise directed). Avoid stairs if possible. Do not lift or move heavy objects. Do not drive until cleared by your physician. Bandage and Wound Care Instructions:    Keep splint/dressing clean and dry. Do not shower or bathe the operative extremity. Do not remove the splint/dressing (unless otherwise directed); it will be removed at your first postoperative office visit at about 2 weeks. Do not attempt to put anything between the splint or dressing and your skin; some itching is normal.  If the overlying ace wrap feels too tight, you may gently loosen it. Call the office if you have any questions or concerns. Ice and Elevation:    Elevate operative extremity as often as possible to reduce swelling and discomfort during the first 2 weeks. For the first 2 weeks, keep it elevated almost around the clock, and don't leave it not elevated for longer than 15 minutes at a time.   Elevate with about 2-3 pillows underneath the calf (about 4-8\" above the heart if laying flat, but NOT HIGHER), avoiding direct pressure on the back of the heel. Ice: Apply ice bag over the splint/dressing for 15-20 minutes (but NOT LONGER) every  minutes while awake as needed to help with swelling and pain control. Special Instructions-- seek medical attention immediately if you develop any of the following (call your doctor and/or head to an Emergency Department right away):  Fever over 100 degrees by mouth. Pain not relieved by medication ordered. Swelling, increased redness, warmth, or hardness around operative area. Numb, tingling or cold toes. Toe(s) become white or bluish. Bandage becomes wet, soiled, or blood soaked (small amount of bleeding may be normal). Increased or progressive drainage from surgical area. Chest pain, shortness of breath. Follow up instructions: You will need to follow up with Dr. Precilla Babinski in about 14 days. Call when you get home to confirm your appointment. If you have any questions, please contact Bella , or Katarina Lombardi [phone].       Andre Reed MD  Orthopedic Surgery      [facility]  [facility] [address], [address]  [phone]    [facility] Orthopa[facility]ics and [address]  [facility], [facility] [address]  [phone]    [facility] [address]  [address] [place] [address], [address] [place] [address]  [phone]

## 2022-11-16 NOTE — ANESTHESIA POSTPROCEDURE EVALUATION
Department of Anesthesiology  Postprocedure Note    Patient: Maye Villatoro  MRN: [id number]  YOB: 1975  Date of evaluation: 11/16/2022      Procedure Summary     Date: 11/16/22 Room / Location: [address] [place] [address] / [facility] - INPATIENT    Anesthesia Start: 7161 Anesthesia Stop: 0829    Procedure: RIGHT FOOT HARDWARE REMOVAL WITH PERONEAL EXPLORATION - SYNTHES (Right: Foot) Diagnosis:       Closed nondisplaced fracture of distal phalanx of right great toe with routine healing, subsequent encounter      (Closed nondisplaced fracture of distal phalanx of right great toe with routine healing, subsequent encounter [S92.424D])    Surgeons: Rico Baltazar MD Responsible Provider: Harmony Duke MD    Anesthesia Type: general ASA Status: 2          Anesthesia Type: No value filed.     Brisa Phase I: Brisa Score: 9    Brisa Phase II:        Anesthesia Post Evaluation    Complications: no

## 2022-11-16 NOTE — INTERVAL H&P NOTE
I spoke to the patient before heading to the OR, and the operative site was identified, verified and marked. The procedure was verified. We have reviewed the risks, benefits, and alternatives to the procedure. No guarantees were made. I have also reviewed the history and physical. There are no significant changes in medical history. All questions were answered and they wish to proceed as planned.      Electronically signed by Layla Norman MD

## 2022-11-16 NOTE — OP NOTE
[place] [address]  Dept: [phone]  Loc: [phone]      Orthopedic Surgery Operative Report      Patient: Froy Gregorio      MRN#: [id number]     YOB: 1975      Date of Admission: 11/16/2022    Attending Surgeon: Eduardo Apple M.D.   PCP: Amelia Quiñones DO        Preoperative Diagnosis:   Right ankle retained hardware   Right ankle peroneal tendonitis   History of insulin-dependent diabetes, currently in remission secondary gastric bypass surgery  Body mass index is 31.95 kg/m². Postoperative Diagnosis:   Same     Procedures Performed:   (11/16/2022)  Right ankle removal of hardware  Right peroneus brevis debridement and tenolysis  Right peroneus longus tenolysis        MODIFIER 22: The work performed in this case was substantially greater than typically required due to the following factor(s):  presence of significant scar tissue, bony overgrowth on top of the hardware/implant, and poor bone quality requiring extra fixation to create adequate stability. Implants:    none      Attending Surgeon:     Timothy Alves MD      Assistant Surgeon:    N/a      Anesthesia:    General      Staff:  Surgeon(s):  Mat Veliz MD  Scrub Person First: Coleman Paz  Scrub Person Second: Raz Montes  Anesthesia: Julee Juarez MD      Estimated Blood Loss:    Minimal      Complications:    None      Specimen:    * No specimens in log *      History:    Ms. Froy Gregorio is a 49 y.o. female who was seen recently for the above problem. She has a history of right ankle pain, with retained hardware and peroneal tendinopathy, as well as some underlying posttraumatic ankle arthritis. She also has a history of a right great toe fracture of the distal phalanx, sustained on 1/6/2022. She has healed well with conservative management. Notably, she has the past medical history as above.   She has a history of insulin-dependent diabetes (she now reports that she does not take insulin and her diabetes is in remission, after she had a gastric bypass surgery). She is currently medically stable and appropriate for the planned procedure. We have spoken about the risks/benefits/alternatives to a surgical intervention, verbal understanding of these risks was expressed, and informed consent was obtained. All questions were answered. No guarantees were made or implied. I have answered all questions, and they wish to proceed with surgical intervention. Operative Note:    The patient was identified in the preoperative holding area by name, MRN, and . The surgical site was verified and marked according to AAOS guidelines. The patient was taken to the operating room and placed on the operating table in a supine position. After achieving adequate sedation by the anesthesia team, the patient was then carefully positioned and all bony prominences were then padded. A tourniquet was placed on the ipsilateral thigh, and then the operative extremity was prepped and draped in the usual sterile fashion. A timeout was held in which the patient's identity, surgical site, procedure, allergies, and antibiotics were verified, and all in the room were in agreement, and thus the procedure began. The leg was exsanguinated to the level of the tourniquet. The tourniquet was elevated to 275 mm Hg, and the total tourniquet time was 30 minutes. Attention was turned to the distal fibula, utilizing the prior incision. The skin was incised sharply and hemostasis was obtained.  Careful sharp and blunt dissection was performed, down to the level of the hardware, by elevating soft tissue at the edges of the metal.  There was noted to be a significant amount of bone growing over top of the plate and screw heads, particular proximally, and 1/4 inch straight osteotome and mallet were used combination with a rongeur to remove the bone in order to allow for hardware removal.  The hardware was then able to be removed without complication. There were no signs of infection. A rongeur was used to debride the scar tissue underneath the plate and the bone was debrided, and the wound was irrigated with sterile saline. Attention was then turned to exploration of the peroneal tendons. Through the same lateral incision, careful soft tissue dissection was performed posterior to the lateral malleolus, down to the level of the peroneal tendons. The peroneal tendon sheath was incised in line with the incision and the tendons were identified. The peroneus brevis was noted to have no tear and the appeared healthy, but was encased in a significant amount of scar tissue, and was also noted to have a low-lying muscle belly. Sharp debridement using a scalpel and Metzenbaum scissors were used to debride the scar tissue and synovitis binding the tendon, and the tendon was completely freed up to the proximal level of the incision and distal the tip of the fibula. None of the tendon was excised, but the muscle belly was excisionally debrided to debulk the retro-fibular space. After the scar tissue debridement and tenolysis, the peroneus brevis was not adherent, and was noted to glide smoothly without catching/tethering/bunching. The peroneus longus was noted to have no tear and the appeared healthy, but was encased in a significant amount of scar tissue. Sharp debridement using a scalpel and Metzenbaum scissors were used to debride the scar tissue and synovitis binding the tendon, and the tendon was completely freed up to the proximal level of the incision and distal the tip of the fibula. None of the tendon was excised. After the scar tissue debridement and tenolysis, the peroneus longus was not adherent, and was noted to glide smoothly without catching/tethering/bunching.       Fluoroscopy was used to verify that the desired hardware was removed completely and that there were no iatrogenic fractures caused in the process. Copious sterile irrigation was used to rinse the operative sites, and a layered closure was performed using 2-0 vicryl and 3-0 nylon, providing appropriate tension to the skin. The skin was cleaned and gently dried. Steri-Strips were then applied, and anti-bacterial ointment was applied on top of that, with Adaptic and fluffs. A sterile layer of cast padding was then applied. A soft dressing with ace wraps was then placed, and the leg was placed into a CAM boot. The patient was aroused from anesthesia without complication, and gently transferred to the bed and then transported to the postoperative area in a stable condition. The patient was noted to have tolerated the procedure well without complication. Postoperative Plan:         Precautions:  nonweight bearing x 2 weeks anticipated on the Right lower extremity   -             []  Physical Therapy/Home exercises       Immobilization:      []  Splint/Cast  [x]  CAM boot  []  Comfortable shoe    []  Other:      DVT ppx:   [x]  Early mobilization       [x]  Medication as prescribed (Aspirin 325 mg PO q Day)      []  No chemical ppx needed; mechanical only   -    Pain control:  Medication as prescribed (dosing and quantity) indicated for acute postoperative pain control   -    Special concerns:       []        [x]  Avoid strenuous activity/pain provoking maneuvers and high-impact repetitive exercises    -    Disposition:   PACU      The patient has been instructed to follow up in the office. The particulars of surgery as well as the condition of the patient postoperatively were discussed with the patients family thereafter per the patient's wishes.            Myrlene Severs, MD  Orthopedic Surgery

## 2022-11-16 NOTE — ANESTHESIA PRE PROCEDURE
Department of Anesthesiology  Preprocedure Note       Name:  Curt Nunez   Age:  49 y.o.  :  1975                                          MRN:  [id number]         Date:  2022      Surgeon: Lulu Spear):  Polly Holcomb MD    Procedure: Procedure(s):  RIGHT FOOT HARDWARE REMOVAL WITH PERONEAL EXPLORATION - SYNTHES    Medications prior to admission:   Prior to Admission medications    Medication Sig Start Date End Date Taking? Authorizing Provider   Handicap Placard MISC by Does not apply route 11/15/2022-2023 11/10/22   Polly Holcomb MD   citalopram (CELEXA) 10 MG tablet Take 20 mg by mouth daily    Historical Provider, MD       Current medications:    Current Facility-Administered Medications   Medication Dose Route Frequency Provider Last Rate Last Admin    [START ON 2022] lidocaine PF 1 % injection 1 mL  1 mL IntraDERmal Once PRN [place] Johan, DO        [START ON 2022] 0.9 % sodium chloride infusion   IntraVENous Continuous Koko Packer, DO        [START ON 2022] lactated ringers infusion   IntraVENous Continuous Cornelio Prado,  mL/hr at 22 0628 New Bag at 22 0628    sodium chloride flush 0.9 % injection 5-40 mL  5-40 mL IntraVENous 2 times per day Koko Packer, DO        sodium chloride flush 0.9 % injection 5-40 mL  5-40 mL IntraVENous PRN East Johan, DO        0.9 % sodium chloride infusion   IntraVENous PRN Cornelio Johan, DO        ceFAZolin (ANCEF) 2000 mg in dextrose 5 % 50 mL IVPB  2,000 mg IntraVENous Once Polly Holcomb MD           Allergies: Allergies   Allergen Reactions    Morphine Nausea And Vomiting       Problem List:  There is no problem list on file for this patient. Past Medical History:        Diagnosis Date    Arthritis     Right ankle with peroneal tendonitis    COVID-19 2021    and 2022. Mild both times, no breathing issues per pt.     Depression     On Celexa, pt. states stable at this time    Diabetes [facility])     History of, improved after weight loss surgery per pt., not taking meds at this time (11/2/22)    Hypertension     Improved per pt. after weight loss surgery, no meds at this time (11/2/22)    Vertigo     Positional       Past Surgical History:        Procedure Laterality Date    ANKLE FRACTURE SURGERY Right     pins    CHOLECYSTECTOMY      GROWTH PLATE SURGERY      plate in head    HYSTERECTOMY (CERVIX STATUS UNKNOWN)      Partial; still has both ovaries       Social History:    Social History     Tobacco Use    Smoking status: Never    Smokeless tobacco: Never   Substance Use Topics    Alcohol use: Not Currently                                Counseling given: Not Answered      Vital Signs (Current):   Vitals:    11/16/22 0613 11/16/22 0705 11/16/22 0711   BP: 132/86 135/65 (!) 122/57   Pulse: 62 60 62   Resp: 18 18 17   Temp: 97.1 °F (36.2 °C)     TempSrc: Temporal     SpO2: 96% 100% 100%   Weight: 192 lb (87.1 kg)     Height: 5' 5\" (1.651 m)                                                BP Readings from Last 3 Encounters:   11/16/22 (!) 122/57   11/02/22 (!) 144/89   11/22/21 (!) 140/85       NPO Status: Time of last liquid consumption: 2315                        Time of last solid consumption: 2145                        Date of last liquid consumption: 11/15/22                        Date of last solid food consumption: 11/15/22    BMI:   Wt Readings from Last 3 Encounters:   11/16/22 192 lb (87.1 kg)   11/10/22 192 lb (87.1 kg)   11/02/22 192 lb 9.6 oz (87.4 kg)     Body mass index is 31.95 kg/m².     CBC:   Lab Results   Component Value Date/Time    WBC 5.0 11/02/2022 02:44 PM    RBC 4.31 11/02/2022 02:44 PM    RBC 4.95 02/21/2012 12:35 PM    HGB 11.9 11/02/2022 02:44 PM    HCT 37.7 11/02/2022 02:44 PM    MCV 87.5 11/02/2022 02:44 PM    RDW 12.6 11/02/2022 02:44 PM     11/02/2022 02:44 PM     02/21/2012 12:35 PM       CMP:   Lab Results   Component Value Date/Time     11/02/2022 02:44 PM    K 4.5 11/02/2022 02:44 PM     11/02/2022 02:44 PM    CO2 27 11/02/2022 02:44 PM    BUN 12 11/02/2022 02:44 PM    CREATININE 0.64 11/02/2022 02:44 PM    GFRAA >60 07/19/2021 09:24 AM    LABGLOM >60 11/02/2022 02:44 PM    GLUCOSE 132 11/02/2022 02:44 PM    GLUCOSE 329 02/19/2012 04:40 PM    PROT 6.9 07/19/2021 09:24 AM    CALCIUM 9.3 11/02/2022 02:44 PM    BILITOT 0.53 07/19/2021 09:24 AM    ALKPHOS 53 07/19/2021 09:24 AM    AST 21 07/19/2021 09:24 AM    ALT 18 07/19/2021 09:24 AM       POC Tests: No results for input(s): POCGLU, POCNA, POCK, POCCL, POCBUN, POCHEMO, POCHCT in the last 72 hours. Coags: No results found for: PROTIME, INR, APTT    HCG (If Applicable):   Lab Results   Component Value Date    PREGTESTUR NEGATIVE 02/19/2012        ABGs: No results found for: PHART, PO2ART, GMG2QJQ, DFB4BHS, BEART, W9MWGCUV     Type & Screen (If Applicable):  No results found for: LABABO, LABRH    Drug/Infectious Status (If Applicable):  No results found for: HIV, HEPCAB    COVID-19 Screening (If Applicable): No results found for: COVID19        Anesthesia Evaluation    Airway: Mallampati: I  TM distance: >3 FB   Neck ROM: full  Mouth opening: > = 3 FB   Dental:          Pulmonary:                              Cardiovascular:    (+) hypertension:,     (-)  angina                Neuro/Psych:               GI/Hepatic/Renal:             Endo/Other:        (-) diabetes mellitus               Abdominal:             Vascular:           Other Findings:           Anesthesia Plan      general     ASA 2                                   Dorota Dill MD   11/16/2022

## 2022-11-17 ENCOUNTER — CLINICAL DOCUMENTATION (OUTPATIENT)
Dept: ORTHOPEDIC SURGERY | Age: 47
End: 2022-11-17

## 2022-11-17 NOTE — PROGRESS NOTES
I called the patient at home for a routine check to discuss how she was doing, as well as reinforce the relevant postoperative restrictions/precautions, discuss medications, and answer any further questions. I was unable to reach her, but did leave a voice message and encouraged her to reach out with any concerns.

## 2022-12-01 ENCOUNTER — OFFICE VISIT (OUTPATIENT)
Dept: ORTHOPEDIC SURGERY | Age: 47
End: 2022-12-01

## 2022-12-01 VITALS — OXYGEN SATURATION: 100 % | HEIGHT: 65 IN | BODY MASS INDEX: 31.99 KG/M2 | WEIGHT: 192 LBS | RESPIRATION RATE: 16 BRPM

## 2022-12-01 DIAGNOSIS — Z96.9 RETAINED ORTHOPEDIC HARDWARE: Primary | ICD-10-CM

## 2022-12-01 PROCEDURE — 99024 POSTOP FOLLOW-UP VISIT: CPT | Performed by: ORTHOPAEDIC SURGERY

## 2022-12-01 NOTE — PROGRESS NOTES
[address] [place] [address] [facility]  [address]  [facility] [address]  Dept: [phone]    Ambulatory Orthopedic Postoperative Visit     Preoperative Diagnosis:   Right ankle retained hardware   Right ankle peroneal tendonitis   History of insulin-dependent diabetes, currently in remission secondary gastric bypass surgery  Body mass index is 31.95 kg/m². Postoperative Diagnosis:   Same      Procedures Performed:   (11/16/2022)  Right ankle removal of hardware  Right peroneus brevis debridement and tenolysis  Right peroneus longus tenolysis         SUBJECTIVE:     The patient returns post op from the above stated procedure. Reports doing well overall, reports improved pain, denies wound drainage/issues, fevers/chills/night sweats, calf swelling/pain, chest pain, shortness of breath. OBJECTIVE:  Resp 16   Ht 5' 5\" (1.651 m)   Wt 192 lb (87.1 kg)   LMP 06/29/2016 (Approximate)   SpO2 100%   BMI 31.95 kg/m²    NAD, resting comfortably  Incisions clean/dry/intact, no erythema/dehiscence/drainage  Sensation to light touch grossly intact throughout  Warm and well perfused  Grossly neurovascularly intact distally  No signs of infection  No calf swelling/tenderness      RADIOLOGY:   12/1/2022 No new radiology images today. Prior images reviewed for reference. ASSESSMENT AND PLAN:     2 weeks s/p above, doing well overall        She has a history of right ankle pain, with retained hardware and peroneal tendinopathy, as well as some underlying posttraumatic ankle arthritis. She also has a history of a right great toe fracture of the distal phalanx, sustained on 1/6/2022. She has healed well with conservative management. Notably, she has the past medical history as above.   She has a history of insulin-dependent diabetes (she now reports that she does not take insulin and her diabetes is in remission, after she had a gastric bypass surgery). [x]  Sutures/staples were removed and steri-strips applied          Precautions:                -Weightbearing as tolerated in the cam boot, may wean out of the boot as tolerated, does not have to sleep in the cam boot             [x]  Physical Therapy/Home exercises        Immobilization:      []  Splint/Cast                      [x]  CAM boot              []  Comfortable shoe    []  Other:                 DVT ppx:   [x]  Early mobilization             [x]  Medication as prescribed (Aspirin 325 mg PO q Day)                   []  No chemical ppx needed; mechanical only              -     Pain control:  Medication as prescribed (dosing and quantity) indicated for acute postoperative pain control              -     Special concerns:       []         [x]  Avoid strenuous activity/pain provoking maneuvers and high-impact repetitive exercises     -We discussed that she will remain off work until her next visit here. At the 6-week he, I anticipate returning her to work but with a lifting restriction of no greater than 20 pounds. All questions were answered and the patient agrees with the above plan. The patient will return to clinic in 4 weeks with right ankle x-rays         No follow-ups on file. No orders of the defined types were placed in this encounter. No orders of the defined types were placed in this encounter. Abbey Zepeda MD  Orthopedic Surgery        Please excuse any typos/errors, as this note was created with the assistance of voice recognition software. While intending to generate a document that actually reflects the content of the visit, the document can still have some errors including those of syntax and sound-a-like substitutions which may escape proof reading. In such instances, actual meaning can be extrapolated by context.

## 2022-12-08 ENCOUNTER — HOSPITAL ENCOUNTER (OUTPATIENT)
Dept: PHYSICAL THERAPY | Age: 47
Setting detail: THERAPIES SERIES
Discharge: HOME | End: 2022-12-08
Payer: COMMERCIAL

## 2022-12-08 PROCEDURE — 97161 PT EVAL LOW COMPLEX 20 MIN: CPT

## 2022-12-08 PROCEDURE — 97110 THERAPEUTIC EXERCISES: CPT

## 2022-12-08 PROCEDURE — 97016 VASOPNEUMATIC DEVICE THERAPY: CPT

## 2022-12-08 NOTE — CONSULTS
[] [address] [facility] [address]   [address]  P: [phone]  F: [phone] [x] [address] [facility]  [address] [facility]   [address]  P: [phone]  F: [phone]       Physical Therapy Lower Extremity Evaluation    Date:  2022  Patient: Gaurang Price  : 1975  MRN: [id number]  Physician: Dr. Jen Reyes MD     Insurance: [facility] ([phone], HARD MAX)  Medical Diagnosis: Z96.9 - Retained orthopedic hardware    Rehab Codes: M79.671, M25.571, M62.81, R26.89  Onset date: surgery 2022  Next Dr's appt. : 2022    Subjective:   CC: R foot and ankle pain  HPI: Pt reports history of R ankle hardware placement around . Pt reports increased pain and irritation from her hardware. Pt reports that she is unable to tolerate some shoes and pressure on her lateral R ankle. Pt reports that she had a gastric bypass surgery in  and then she started noticing increasing symptoms over the last 1+ years. Pt notes that she broke her R great toe in 2022. Pt reports history of R shoulder pain. Pt denies history of L LE injury. Pt under R ankle surgery as noted below with Dr. Yomi Jasso on 2022. Pt arrives ambulating in CAM walking boot, she reports that she was NWB until her appointment with Dr. Yomi Jasso on 2022. Per his office note, able to progress into comfortable shoe as tolerable. Pt reports that overall she has been doing well since surgery. Pt reports that she occasionally will get sharp pains that come and go quickly. She reports some lateral dorsum of the foot pain and some N/T. She reports increased hip and back pain since being in the book. Pt reports significant improvements in swelling. Pt reports that she has taken some steps around her house without her CAM boot when she gets up to go to the bathroom at night and she reports that overall she feels good.  Pt reports that she takes one axillary crutch with her if she is walking for longer distances or has been on her foot for long periods of time.      Procedures Performed:   (11/16/2022)  Right ankle removal of hardware  Right peroneus brevis debridement and tenolysis  Right peroneus longus tenolysis    PMHx: [x] Diabetes [x] HTN  [x] Arthritis [x] Other: anxiety, depression              [x] Refer to full medical chart  In EPIC       Comorbidities:   [] Obesity [] Dialysis  [x] N/A   [] Asthma/COPD [] Dementia [] Other:   [] Stroke [] Sleep apnea [] Other:   [] Vascular disease [] Rheumatic disease [] Other:     Tests: [x] X-Ray: [] MRI:  [] Other:    Medications: [x] Refer to full medical record  Allergies:      [x] Refer to full medical record     Function:  Hand Dominance  [x] Right  [] Left  Martial Status    Home type 2 story house with bedroom upstairs   Stairs from outside 3 steps but also ramp, no steps into garage   Stairs inside 19 up to bedroom   [facility] status  for outside lawn and garden; off d/t condition until at least 1/9/2023   Work Activities/duties  Highly physical - prolonged walking and standing, lifting, moving objects   Recreational Activities Taking dogs for walk, currently renovating home, movies        ADL/IADL Previous level of function Current level of function Who currently assists the patient with task   Bathing [x] Independent  [] Assist [x] Independent  [] Assist    Dress/grooming [x] Independent  [] Assist [x] Independent  [] Assist    Transfer/mobility [x] Independent  [] Assist [x] Independent  [] Assist    Feeding [x] Independent  [] Assist [x] Independent  [] Assist    Toileting [x] Independent  [] Assist [x] Independent  [] Assist    Driving [x] Independent  [] Assist [] Independent  [x] Assist    Housekeeping [x] Independent  [] Assist [x] Independent  [] Assist    Grocery shop/meal prep [x] Independent  [] Assist [x] Independent  [] Assist      Gait Prior level of function Current level of function    [x] Independent  [] Assist [x] Independent  [] Assist   Device: [x] Independent [x] Independent       Pain present? None at rest   Location R ankle   Pain Rating currently 0/10   Pain altered treatment N/A   Pain at worse 9/10 when on foot for too long   Pain at best 0/10   Description of pain Sharp, shooting, burning   Altered Sensation N/T to top of foot around digit 4   What makes it worse Prolonged walking and standing   What makes it better Cold, medication, rest   Symptom progression Improving    Sleep Not disturbed           Objective:    ROM  ° A/P STRENGTH TESTS (+/-) Left Right Not Tested    Left Right Left Right Ant.  Drawer   []   Hip Flex   5/5 5/5 Post. Drawer   []   Ext     Lachmans   []   ER     Valgus Stress   []   IR     Varus Stress   []   ABD   4+/5 4+/5 Bárbaras   []   ADD     Apleys Comp.   []   Knee Flex     Apleys Dist.   []   Ext   5/5 5/5 Hip Scouring   []   Ankle DF (knee straight) 8 4 5/5 5/5 MILANs   []   DF (knee bent) 10 4   Piriformis   []   PF 32 22 5/5 4/5 Aracelis's   []   INV 42 26 5/5 4+/5 Eliceo   []   EVER 5 8 5/5 4+/5 Talor Tilt   []   GTE 40 39   Pat-Fem Grind   []     Flexibility Normal Left tight Right tight   Hip flexor [] [] []   quad [] [] []   HS [] [] []   piriformis [] [] []   ITB [] [] []   gastroc [] [x] [x]   Soleus  [] [x] [x]    [] [] []    [] [] []      OBSERVATION No Deficit Deficit Not Tested Comments   Posture       Forward Head [] [] []    Rounded Shoulders [] [] []    Kyphosis [] [] []    Lordosis [] [] []    Lateral Shift [] [] []    Scoliosis [] [] []    Iliac Crest [] [] []    PSIS [] [] []    ASIS [] [] []    Genu Valgus [x] [] []    Genu Varus [x] [] []    Genu Recurvatum [] [] []    Pronation [x] [] []    Supination [x] [] [] Normal foot type bilaterally   Leg Length Discrp [] [] []    Slumped Sitting [] [] []    Palpation [] [x] [] Tender to palpation of dorsum of foot   Sensation [] [x] [] Lack of sensation to lateral dorsum of foot   Edema [x] [] [] Figure 8: R 52.5 cm, L 52.5 cm   Neurological [x] [] []    Patellar Mobility [x] [] []    Patellar Orientation [x] [] []    Gait [] [x] [] Analysis: currently ambulating in CAM boot, mild impairments in WB tolerance         FUNCTION Normal Difficult Unable   Sitting [x] [] []   Standing [] [x] []   Ambulation [] [x] []   Groom/Dress [x] [] []   Lift/Carry [] [x] []   Stairs [] [x] []   Bending [] [x] []   Squat [] [x] []   Kneel [x] [] []     BALANCE/PROPRIOCEPTION              [] Not tested   Single leg stance       R                     L                                PAIN   Eyes open                 3          Sec. 18          Sec                  . []    Eyes closed                          Sec. Sec                  . []        FUNCTIONAL TESTS PAIN NO PAIN COMMENTS   Step Test 4 [] []    6 [] []    8 [] []    Squat [] []      Functional Test: Foot and Ankle Ability Measure (FAAM) Score: 59/84, 42% functionally impaired     Comments:    Assessment: Ursula Rey is a 49 y.o. female presenting following R ankle hardware removal and debridement with Dr. Radha Barnett on 11/16/2022. Pt arrives ambulating WBAT in CAM walking boot and reports that she was NWB for the first 2 weeks. Overall pt reports improvements in pain and activity tolerance, most limited with increased walking at this time. Physical therapy signs and symptoms consistent per post operative status. Patient would benefit from skilled physical therapy services in order to: improve R ankle strength and mobility in order to improve gait mechanics and tolerance to prolonged walking, standing and lifting of objects in order to return to work. Problems:    [x] ? Pain: R ankle/foot pain with increased ambulation  [x] ? ROM: impaired R ankle mobility  [x] ? Strength: impaired R ankle strength  [x] ?  Function: FAAM = 42% functional impairment  [x] Other: impaired WB       STG: (to be met in 6 Ankle Inversion and Eversion AROM - 2 x daily - 7 x weekly - 1 sets - 20 reps  Ankle Alphabet in Elevation - 2 x daily - 7 x weekly - 1 sets - 1 reps  Seated Toe Raise - 2 x daily - 7 x weekly - 2 sets - 10 reps  Seated Heel Raise - 2 x daily - 7 x weekly - 2 sets - 10 reps    Comprehension of Education:  [x] Verbalizes understanding. [x] Demonstrates understanding. [x] Needs Review. [] Demonstrates/verbalizes understanding of HEP/Ed previously given. Treatment Plan:  [x] Therapeutic Exercise   54190  [] Iontophoresis: 4 mg/mL Dexamethasone Sodium Phosphate  mAmin  94115   [x] Therapeutic Activity  70409 [x] Vasopneumatic cold with compression  38661    [x] Gait Training   83005 [] Ultrasound   96997   [x] Neuromuscular Re-education  72703 [] Electrical Stimulation Unattended  68005   [x] Manual Therapy  02350 [] Electrical Stimulation Attended  51989   [x] Instruction in HEP  [] Lumbar/Cervical Traction  31339   [] Aquatic Therapy   42346 [x] Cold/hotpack    [] Massage   30701      [] Dry Needling, 1 or 2 muscles  65486   [] Biofeedback, first 15 minutes   61841  [] Biofeedback, additional 15 minutes   15017 [] Dry Needling, 3 or more muscles  65875     []  Medication allergies reviewed for use of    Dexamethasone Sodium Phosphate 4mg/ml     with iontophoresis treatments. Pt is not allergic.     Frequency:  2 x/week for 12 visits        Todays Treatment:  Modalities: vasocompression x15 min on min pressure and 34 degrees  Precautions: Weightbearing as tolerated in the cam boot, may wean out of the boot as tolerated, does not have to sleep in the cam boot; surgery 11/16/2022  Exercises:  Exercise Reps/ Time Weight/ Level Comments   Supine HS + DF stretch 3x30\" strap    Long sitting gastroc stretch 3x30\" strap    Long sitting ankle AROM  20x ea  DF, PF, inv, ever   Long sitting ankle ABCs 1x     Seated heel raises 10x2     Seated toe raises 10x2     Other:    Specific Instructions for next treatment: intrinsic foot strengthening, gait mechanics in walking shoe, mobility, strength and WB tolerance      Evaluation Complexity:  History (Personal factors, comorbidities) [] 0 [x] 1-2 [] 3+   Exam (limitations, restrictions) [] 1-2 [x] 3 [] 4+   Clinical presentation (progression) [x] Stable [] Evolving  [] Unstable   Decision Making [x] Low [] Moderate [] High    [x] Low Complexity [] Moderate Complexity [] High Complexity       Treatment Charges: Mins Units   [x] Evaluation       [x]  Low       []  Moderate       []  High 25 1   []  Modalities     [x]  Ther Exercise 15 1   []  Manual Therapy     []  Ther Activities     []  Aquatics     [x]  Vasocompression 15 1   []  Other       TOTAL TREATMENT TIME: 55    Time in: 3:00 pm   Time Out: 4:05 pm    Electronically signed by: Wolfgang Garcia PT        Physician Signature:________________________________Date:__________________  By signing above or cosigning this note, I have reviewed this plan of care and certify a need for medically necessary rehabilitation services.      *PLEASE SIGN ABOVE AND FAX BACK ALL PAGES*

## 2022-12-13 ENCOUNTER — HOSPITAL ENCOUNTER (OUTPATIENT)
Dept: PHYSICAL THERAPY | Age: 47
Setting detail: THERAPIES SERIES
Discharge: HOME | End: 2022-12-13
Payer: COMMERCIAL

## 2022-12-13 PROCEDURE — 97110 THERAPEUTIC EXERCISES: CPT

## 2022-12-13 PROCEDURE — 97140 MANUAL THERAPY 1/> REGIONS: CPT

## 2022-12-13 NOTE — FLOWSHEET NOTE
[] [facility]) - [facility] & Therapy  [address] [facility] [address]  [place]: [phone]   F: [phone]    Physical Therapy Daily Treatment Note      Date:  2022  Patient Name:  Nancy Cochran    :  1975  MRN: [id number]  Physician: Dr. Reed Walden MD                              Insurance: [employer] ([phone], HARD MAX)  Medical Diagnosis: Z96.9 - Retained orthopedic hardware               Rehab Codes: M79.671, M25.571, M62.81, R26.89  Onset date: surgery 2022                    Next Dr's appt. : 2022  Visit# / total visits:   Cancels/No Shows: 0/0    Subjective:    Pain:  [] Yes  [x] No Location:  N/A Pain Rating: (0-10 scale) 0/10  Pain altered Tx:  [] No  [] Yes  Action:  Comments: Patient reports today ambulating without CAM boot, demonstrating an antalgic gait. Reported that she has started to wean out of boot. Noted that she has some increased soreness and swelling with prolonged time out of boot. Objective:  Modalities:   Precautions: Weightbearing as tolerated in the cam boot, may wean out of the boot as tolerated, does not have to sleep in the cam boot; surgery 2022  Exercises:  Exercise Reps/ Time Weight/ Level Completed  Today Comments   Long Sitting Gastroc Stretch 30\"x3  x    Supine HS + DF stretch 30\"x3  x    Long Sitting AROM 20x ea  x DF, PF, Inver, Ever   Plantarflexion Stertch 3x30\"  x           Seated BAPS 20x ea L1 x Inver/Ever, PF/DF, CW, CCW   Other: Manual: STM to R peroneals, tib anterior, Grade III subtalor mobs x 8 min    Specific Instructions for next treatment:      Assessment: [x] Progressing toward goals: Heavy focus on R ankle ROM this date. Considerable improvement in ankle inversion with manual, with less stiffness following. Cues provided with BAPS to reduce hip rotation with notable improvement with cues. Notable difficulty maintaining control with CW/CCW with BAPS. [] No change.      [] Other:    [x] Patient would continue to benefit from skilled physical therapy services in order to: improve R ankle strength and mobility in order to improve gait mechanics and tolerance to prolonged walking, standing and lifting of objects in order to return to work. STG: (to be met in 6 treatments)  ? Pain: Pt will report decreased R ankle pain to <6/10 with progression of ambulation in a comfortable walking shoe. ? ROM: Pt will increase R ankle AROM to WNL when compared to L ankle in order to improve gait mechanics and tolerance to prolonged walking and standing. ? Strength: Pt will increase R ankle strength to 5/5 globally in order to improve tolerance to prolonged walking, standing and lifting for work related tasks. ? Function: Pt will demonstrate ability to ambulate in comfortable walking shoe without significant gait abnormalities. Patient to be independent with home exercise program as demonstrated by performance with correct form without cues. LTG: (to be met in 12 treatments)  Pt will report ability to ambulate in a comfortable walking shoe for 2-3 hours without increased pain levels above 2-3/10 in order to improve tolerance with returning to work. Pt will demonstrate improved R LE weight bearing tolerance as evident by the ability to perform SL standing for 10 seconds. Pt will demonstrate improved functional activity tolerance as evident by an improved score on the FAAM to <25% functional impairment. Patient goals: \"better range of motion\"    Pt. Education:  [x] Yes  [] No  [x] Reviewed Prior HEP/Ed  Method of Education: [x] Verbal  [x] Demo  [] Written  Comprehension of Education:  [x] Verbalizes understanding. [] Demonstrates understanding. [] Needs review. [x] Demonstrates/verbalizes HEP/Ed previously given. Access Code: [email or web address]  URL: [email or web address]  Date: 12/08/2022  Prepared by: Trevon Bartlett     Exercises  Supine Hamstring Stretch with Strap - 2 x daily - 7 x weekly - 1 sets - 3 reps - 30 hold  Long Sitting Calf Stretch with Strap - 2 x daily - 7 x weekly - 1 sets - 3 reps - 30 hold  Supine Ankle Dorsiflexion and Plantarflexion AROM - 2 x daily - 7 x weekly - 1 sets - 20 reps  Supine Ankle Inversion and Eversion AROM - 2 x daily - 7 x weekly - 1 sets - 20 reps  Ankle Alphabet in Elevation - 2 x daily - 7 x weekly - 1 sets - 1 reps  Seated Toe Raise - 2 x daily - 7 x weekly - 2 sets - 10 reps  Seated Heel Raise - 2 x daily - 7 x weekly - 2 sets - 10 reps        Plan: [x] Continue per plan of care.    [] Other:      Treatment Charges: Mins Units   []  Modalities     [x]  Ther Exercise 31 2   [x]  Manual Therapy 8 1   []  Ther Activities     []  Aquatics     []  Neuromuscular     [] Vasocompression     [] Gait Training     [] Dry needling        [] 1 or 2 muscles        [] 3 or more muscles     []  Other     Total Treatment time 40 3     Time In: 11:20 am            Time Out: 12:00 pm    Electronically signed by:  Fanny King PTA

## 2022-12-15 ENCOUNTER — HOSPITAL ENCOUNTER (OUTPATIENT)
Dept: PHYSICAL THERAPY | Age: 47
Setting detail: THERAPIES SERIES
Discharge: HOME | End: 2022-12-15
Payer: COMMERCIAL

## 2022-12-15 PROCEDURE — 97140 MANUAL THERAPY 1/> REGIONS: CPT

## 2022-12-15 PROCEDURE — 97110 THERAPEUTIC EXERCISES: CPT

## 2022-12-15 NOTE — FLOWSHEET NOTE
[] [facility]) - [facility] & Therapy  [address]  [place]: [phone]   F: [phone]    Physical Therapy Daily Treatment Note      Date:  12/15/2022  Patient Name:  Rai Mcmillan    :  1975  MRN: [id number]  Physician: Dr. Carley Villatoro MD                              Insurance: [employer] ([phone], HARD MAX)  Medical Diagnosis: Z96.9 - Retained orthopedic hardware               Rehab Codes: M79.671, M25.571, M62.81, R26.89  Onset date: surgery 2022                    Next Dr's appt. : 2022  Visit# / total visits: 3/12  Cancels/No Shows: 0/0    Subjective:    Pain:  [] Yes  [x] No Location:  N/A Pain Rating: (0-10 scale) 1-2/10  Pain altered Tx:  [x] No  [] Yes  Action:  Comments: Pt reports improvement in mobility and gait mechanics following her last treatment session. She arrives reporting continued stiffness greater than pain. Objective:  Modalities:   Precautions: Weightbearing as tolerated in the cam boot, may wean out of the boot as tolerated, does not have to sleep in the cam boot; surgery 2022  Exercises:  Exercise Reps/ Time Weight/ Level Completed  Today Comments   Long Sitting Gastroc Stretch 30\"x3  x    Supine HS + DF stretch 30\"x3  x    Long Sitting AROM 20x ea Yellow DF, PF x DF, PF, Inver, Ever   Plantarflexion Stretch 3x30\"  x    Posterior DF mob on mat table 10x5\"  x           Seated BAPS 20x ea L1 x Inver/Ever, PF/DF, CW, CCW          Mini squats 10x2  x    Step through with focus on DF and toe off 10x  x           Other: Manual: STM to R peroneals, tib anterior, Grade III subtalor mobs x 8 min    Specific Instructions for next treatment:      Assessment: [x] Progressing toward goals: Continued to focus on improving ROM during today's treatment session. Began treatment with manual therapy followed by exercises as charted above. Progressed exercises with addition of resistance to active DF and PF, DF mob, mini squats and step through.  Pt continues to be challenged with ROM exercises, specifically inversion and eversion, compensatory patterns present with BAPS board. Overall good tolerance to charted exercises with improved gait mechanics evident at end of session. [] No change. [] Other:    [x] Patient would continue to benefit from skilled physical therapy services in order to: improve R ankle strength and mobility in order to improve gait mechanics and tolerance to prolonged walking, standing and lifting of objects in order to return to work. STG: (to be met in 6 treatments)  ? Pain: Pt will report decreased R ankle pain to <6/10 with progression of ambulation in a comfortable walking shoe. ? ROM: Pt will increase R ankle AROM to WNL when compared to L ankle in order to improve gait mechanics and tolerance to prolonged walking and standing. ? Strength: Pt will increase R ankle strength to 5/5 globally in order to improve tolerance to prolonged walking, standing and lifting for work related tasks. ? Function: Pt will demonstrate ability to ambulate in comfortable walking shoe without significant gait abnormalities. Patient to be independent with home exercise program as demonstrated by performance with correct form without cues. LTG: (to be met in 12 treatments)  Pt will report ability to ambulate in a comfortable walking shoe for 2-3 hours without increased pain levels above 2-3/10 in order to improve tolerance with returning to work. Pt will demonstrate improved R LE weight bearing tolerance as evident by the ability to perform SL standing for 10 seconds. Pt will demonstrate improved functional activity tolerance as evident by an improved score on the FAAM to <25% functional impairment. Patient goals: \"better range of motion\"    Pt. Education:  [x] Yes  [] No  [x] Reviewed Prior HEP/Ed  Method of Education: [x] Verbal  [x] Demo  [] Written  Comprehension of Education:  [x] Verbalizes understanding.   [] Demonstrates understanding. [] Needs review. [x] Demonstrates/verbalizes HEP/Ed previously given. Access Code: [email or web address]  URL: [email or web address]  Date: 12/08/2022  Prepared by: Chuck Her     Exercises  Supine Hamstring Stretch with Strap - 2 x daily - 7 x weekly - 1 sets - 3 reps - 30 hold  Long Sitting Calf Stretch with Strap - 2 x daily - 7 x weekly - 1 sets - 3 reps - 30 hold  Supine Ankle Dorsiflexion and Plantarflexion AROM - 2 x daily - 7 x weekly - 1 sets - 20 reps  Supine Ankle Inversion and Eversion AROM - 2 x daily - 7 x weekly - 1 sets - 20 reps  Ankle Alphabet in Elevation - 2 x daily - 7 x weekly - 1 sets - 1 reps  Seated Toe Raise - 2 x daily - 7 x weekly - 2 sets - 10 reps  Seated Heel Raise - 2 x daily - 7 x weekly - 2 sets - 10 reps        Plan: [x] Continue per plan of care.    [] Other:      Treatment Charges: Mins Units   []  Modalities     [x]  Ther Exercise 32 2   [x]  Manual Therapy 8 1   []  Ther Activities     []  Aquatics     []  Neuromuscular     [] Vasocompression     [] Gait Training     [] Dry needling        [] 1 or 2 muscles        [] 3 or more muscles     []  Other     Total Treatment time 40 3     Time In: 11:20 am            Time Out: 12:00 pm    Electronically signed by:  Chuck Her, PT

## 2022-12-20 ENCOUNTER — HOSPITAL ENCOUNTER (OUTPATIENT)
Dept: PHYSICAL THERAPY | Age: 47
Setting detail: THERAPIES SERIES
Discharge: HOME | End: 2022-12-20
Payer: COMMERCIAL

## 2022-12-20 PROCEDURE — 97140 MANUAL THERAPY 1/> REGIONS: CPT

## 2022-12-20 PROCEDURE — 97110 THERAPEUTIC EXERCISES: CPT

## 2022-12-20 NOTE — FLOWSHEET NOTE
[] [facility] ([facility]) - [facility] & Therapy  [address]  [place]: [phone]   F: [phone]    Physical Therapy Daily Treatment Note      Date:  2022  Patient Name:  Brock Snyder    :  1975  MRN: [id number]  Physician: Dr. Roaslba Marrufo MD                              Insurance: [place] ([phone], HARD MAX)  Medical Diagnosis: Z96.9 - Retained orthopedic hardware               Rehab Codes: M79.671, M25.571, M62.81, R26.89  Onset date: surgery 2022                    Next Dr's appt. : 2022  Visit# / total visits:   Cancels/No Shows: 0/0    Subjective:    Pain:  [x] Yes  [] No Location:  N/A Pain Rating: (0-10 scale) 3/10  Pain altered Tx:  [x] No  [] Yes  Action:  Comments: Pt reports today that she continues to fight general stiffness. Reported that pain is more of a dull ache. Objective:  Modalities:   Precautions: Weightbearing as tolerated in the cam boot, may wean out of the boot as tolerated, does not have to sleep in the cam boot; surgery 2022  Exercises:  Exercise Reps/ Time Weight/ Level Completed  Today Comments   Long Sitting Gastroc Stretch 30\"x3  x    Supine HS + DF stretch 30\"x3  x    Long Sitting AROM 20x ea Yellow DF, PF x DF, PF, Inver, Ever   Plantarflexion Stretch 3x30\"  x    Posterior DF mob on mat table 10x5\"  x           Seated BAPS 20x ea L1 x Inver/Ever, PF/DF, CW, CCW          Mini squats 10x2  x    Step through with focus on DF and toe off 10x  x    Heel Raises 10x2  x    Other: Manual: STM to R peroneals, tib anterior, Grade III subtalor mobs x 8 min    Specific Instructions for next treatment:      Assessment: [x] Progressing toward goals: Continued to focus on improving ROM during today's treatment session. Posterior glide provided with DF mobilization to further facilitate DF ROM. Added heel raises for additional LE strengthening and to help promote ankle PF.  Opted to withhold circular directions with BAPS today secondary to difficulty maintaining control. [] No change. [] Other:    [x] Patient would continue to benefit from skilled physical therapy services in order to: improve R ankle strength and mobility in order to improve gait mechanics and tolerance to prolonged walking, standing and lifting of objects in order to return to work. STG: (to be met in 6 treatments)  ? Pain: Pt will report decreased R ankle pain to <6/10 with progression of ambulation in a comfortable walking shoe. ? ROM: Pt will increase R ankle AROM to WNL when compared to L ankle in order to improve gait mechanics and tolerance to prolonged walking and standing. ? Strength: Pt will increase R ankle strength to 5/5 globally in order to improve tolerance to prolonged walking, standing and lifting for work related tasks. ? Function: Pt will demonstrate ability to ambulate in comfortable walking shoe without significant gait abnormalities. Patient to be independent with home exercise program as demonstrated by performance with correct form without cues. LTG: (to be met in 12 treatments)  Pt will report ability to ambulate in a comfortable walking shoe for 2-3 hours without increased pain levels above 2-3/10 in order to improve tolerance with returning to work. Pt will demonstrate improved R LE weight bearing tolerance as evident by the ability to perform SL standing for 10 seconds. Pt will demonstrate improved functional activity tolerance as evident by an improved score on the FAAM to <25% functional impairment. Patient goals: \"better range of motion\"    Pt. Education:  [x] Yes  [] No  [x] Reviewed Prior HEP/Ed  Method of Education: [x] Verbal  [x] Demo  [] Written  Comprehension of Education:  [x] Verbalizes understanding. [] Demonstrates understanding. [] Needs review. [x] Demonstrates/verbalizes HEP/Ed previously given. Access Code: [email or web address]  URL: [email or web address]  Date: 12/08/2022  Prepared by: Micheal Amaya     Exercises  Supine Hamstring Stretch with Strap - 2 x daily - 7 x weekly - 1 sets - 3 reps - 30 hold  Long Sitting Calf Stretch with Strap - 2 x daily - 7 x weekly - 1 sets - 3 reps - 30 hold  Supine Ankle Dorsiflexion and Plantarflexion AROM - 2 x daily - 7 x weekly - 1 sets - 20 reps  Supine Ankle Inversion and Eversion AROM - 2 x daily - 7 x weekly - 1 sets - 20 reps  Ankle Alphabet in Elevation - 2 x daily - 7 x weekly - 1 sets - 1 reps  Seated Toe Raise - 2 x daily - 7 x weekly - 2 sets - 10 reps  Seated Heel Raise - 2 x daily - 7 x weekly - 2 sets - 10 reps        Plan: [x] Continue per plan of care.    [] Other:      Treatment Charges: Mins Units   []  Modalities     [x]  Ther Exercise 34 2   [x]  Manual Therapy 8 1   []  Ther Activities     []  Aquatics     []  Neuromuscular     [] Vasocompression     [] Gait Training     [] Dry needling        [] 1 or 2 muscles        [] 3 or more muscles     []  Other     Total Treatment time 42 3     Time In: 11:23 am            Time Out: 12:05 pm    Electronically signed by:  Lanette Clark PTA

## 2022-12-22 ENCOUNTER — HOSPITAL ENCOUNTER (OUTPATIENT)
Dept: PHYSICAL THERAPY | Age: 47
Setting detail: THERAPIES SERIES
Discharge: HOME | End: 2022-12-22
Payer: COMMERCIAL

## 2022-12-22 PROCEDURE — 97110 THERAPEUTIC EXERCISES: CPT

## 2022-12-22 PROCEDURE — 97140 MANUAL THERAPY 1/> REGIONS: CPT

## 2022-12-22 NOTE — FLOWSHEET NOTE
[] [facility]) - [facility] & Therapy  [address]  [place]: [phone]   F: [phone]    Physical Therapy Daily Treatment Note      Date:  2022  Patient Name:  Ruth Tobar    :  1975  MRN: [id number]  Physician: Dr. Haily Tolentino MD                              Insurance: [employer] [place] ([phone], HARD MAX)  Medical Diagnosis: Z96.9 - Retained orthopedic hardware               Rehab Codes: M79.671, M25.571, M62.81, R26.89  Onset date: surgery 2022                    Next Dr's appt. : 2022  Visit# / total visits:   Cancels/No Shows: 0/0    Subjective:    Pain:  [] Yes  [x] No Location:  N/A Pain Rating: (0-10 scale) 0/10  Pain altered Tx:  [x] No  [] Yes  Action:  Comments: Pt reports today that R ankle feels pretty well overall. Patient denied any recent pain, only that she continues to feel a dull ache around ankle. Objective:  Modalities:   Precautions: Weightbearing as tolerated in the cam boot, may wean out of the boot as tolerated, does not have to sleep in the cam boot; surgery 2022  Exercises:  Exercise Reps/ Time Weight/ Level Completed  Today Comments   Long Sitting Gastroc Stretch 30\"x3  x    Supine HS + DF stretch 30\"x3  x    Long Sitting AROM 20x ea Yellow DF, PF x DF, PF, Inver, Ever   Plantarflexion Stretch 3x30\"  x    Posterior DF mob on mat table 10x5\"  x           Seated BAPS 20x ea L1 x Inver/Ever, PF/DF, CW, CCW          Mini squats 10x2  x    Step through with focus on DF and toe off 10x      Heel Raises 10x2  x    Lateral Step ups with 3\" SLS  20x  8\" x    Other: Manual: STM to R peroneals, tib anterior, Grade III subtalor mobs, cross friction massage around incision  x 10 min    Specific Instructions for next treatment:      Assessment: [x] Progressing toward goals: Continued to focus on improving ROM during today's treatment session.  Added cross friction massage around incision to improve soft tissue mobility with ultimate goal of promoting ROM. Added lateral step ups to improve R ankle strength and challenge to stability. Patien tcontinues to be very stiff, particularly with inversion/eversion motions. [] No change. [] Other:    [x] Patient would continue to benefit from skilled physical therapy services in order to: improve R ankle strength and mobility in order to improve gait mechanics and tolerance to prolonged walking, standing and lifting of objects in order to return to work. STG: (to be met in 6 treatments)  ? Pain: Pt will report decreased R ankle pain to <6/10 with progression of ambulation in a comfortable walking shoe. ? ROM: Pt will increase R ankle AROM to WNL when compared to L ankle in order to improve gait mechanics and tolerance to prolonged walking and standing. ? Strength: Pt will increase R ankle strength to 5/5 globally in order to improve tolerance to prolonged walking, standing and lifting for work related tasks. ? Function: Pt will demonstrate ability to ambulate in comfortable walking shoe without significant gait abnormalities. Patient to be independent with home exercise program as demonstrated by performance with correct form without cues. LTG: (to be met in 12 treatments)  Pt will report ability to ambulate in a comfortable walking shoe for 2-3 hours without increased pain levels above 2-3/10 in order to improve tolerance with returning to work. Pt will demonstrate improved R LE weight bearing tolerance as evident by the ability to perform SL standing for 10 seconds. Pt will demonstrate improved functional activity tolerance as evident by an improved score on the FAAM to <25% functional impairment. Patient goals: \"better range of motion\"    Pt. Education:  [x] Yes  [] No  [x] Reviewed Prior HEP/Ed  Method of Education: [x] Verbal  [x] Demo  [] Written  Comprehension of Education:  [x] Verbalizes understanding. [] Demonstrates understanding.   [] Needs review. [x] Demonstrates/verbalizes HEP/Ed previously given. Access Code: [email or web address]  URL: [email or web address]  Date: 12/08/2022  Prepared by: Maco Beck     Exercises  Supine Hamstring Stretch with Strap - 2 x daily - 7 x weekly - 1 sets - 3 reps - 30 hold  Long Sitting Calf Stretch with Strap - 2 x daily - 7 x weekly - 1 sets - 3 reps - 30 hold  Supine Ankle Dorsiflexion and Plantarflexion AROM - 2 x daily - 7 x weekly - 1 sets - 20 reps  Supine Ankle Inversion and Eversion AROM - 2 x daily - 7 x weekly - 1 sets - 20 reps  Ankle Alphabet in Elevation - 2 x daily - 7 x weekly - 1 sets - 1 reps  Seated Toe Raise - 2 x daily - 7 x weekly - 2 sets - 10 reps  Seated Heel Raise - 2 x daily - 7 x weekly - 2 sets - 10 reps        Plan: [x] Continue per plan of care.    [] Other:      Treatment Charges: Mins Units   []  Modalities     [x]  Ther Exercise 28 2   [x]  Manual Therapy 10 1   []  Ther Activities     []  Aquatics     []  Neuromuscular     [] Vasocompression     [] Gait Training     [] Dry needling        [] 1 or 2 muscles        [] 3 or more muscles     []  Other     Total Treatment time 38 3     Time In: 11:25 am            Time Out: 12:03 pm    Electronically signed by:  Alexandru Galvan PTA

## 2022-12-27 ENCOUNTER — HOSPITAL ENCOUNTER (OUTPATIENT)
Dept: PHYSICAL THERAPY | Age: 47
Setting detail: THERAPIES SERIES
Discharge: HOME | End: 2022-12-27
Payer: COMMERCIAL

## 2022-12-27 PROCEDURE — 97140 MANUAL THERAPY 1/> REGIONS: CPT

## 2022-12-27 PROCEDURE — 97110 THERAPEUTIC EXERCISES: CPT

## 2022-12-27 NOTE — FLOWSHEET NOTE
[] [facility]) - [facility] & Therapy  [address]  [place]: [phone]   F: [phone]    Physical Therapy Daily Treatment Note      Date:  2022  Patient Name:  Mykel Aguillon    :  1975  MRN: [id number]  Physician: Dr. Real Pierson MD                              Insurance: [employer] ([phone], HARD MAX)  Medical Diagnosis: Z96.9 - Retained orthopedic hardware               Rehab Codes: M79.671, M25.571, M62.81, R26.89  Onset date: surgery 2022                    Next Dr's appt. : 2022  Visit# / total visits:   Cancels/No Shows: 0/0    Subjective:    Pain:  [] Yes  [x] No Location:  N/A Pain Rating: (0-10 scale) 0/10  Pain altered Tx:  [x] No  [] Yes  Action:  Comments: Patient reports today that R ankle continues to be very stiff. Reported that she feels occasional pain in R ankle, but nothing consistent. Objective:  Modalities:   Precautions: Weightbearing as tolerated in the cam boot, may wean out of the boot as tolerated, does not have to sleep in the cam boot; surgery 2022  Exercises:  Exercise Reps/ Time Weight/ Level Completed  Today Comments   Long Sitting Gastroc Stretch 30\"x3  x    Supine HS + DF stretch 30\"x3  x    Long Sitting AROM 20x ea Yellow DF, PF x DF, PF, Inver, Ever   Plantarflexion Stretch 3x30\"  x    Posterior DF mob on mat table 15x5\"  x           Seated BAPS 20x ea L1 x Inver/Ever, PF/DF, CW, CCW          Mini squats 10x2      Step through with focus on DF and toe off 10x      Heel Raises 10x2  x    Lateral Step ups with 3\" SLS  20x  8\" x    TRX Assisted Squats 10x2  x    Other: Manual: STM to R peroneals, tib anterior, Grade III subtalor mobs, cross friction massage around incision  x 10 min    Specific Instructions for next treatment:      Assessment: [x] Progressing toward goals: Continued heavy focus on L ankle ROM.  Added TRX squats for additional LE strengthening, encouraging max tolerable depth to further promote ankle DF. Mild improvement in ankle mobility reported following exercises today. [] No change. [] Other:    [x] Patient would continue to benefit from skilled physical therapy services in order to: improve R ankle strength and mobility in order to improve gait mechanics and tolerance to prolonged walking, standing and lifting of objects in order to return to work. STG: (to be met in 6 treatments)  ? Pain: Pt will report decreased R ankle pain to <6/10 with progression of ambulation in a comfortable walking shoe. ? ROM: Pt will increase R ankle AROM to WNL when compared to L ankle in order to improve gait mechanics and tolerance to prolonged walking and standing. ? Strength: Pt will increase R ankle strength to 5/5 globally in order to improve tolerance to prolonged walking, standing and lifting for work related tasks. ? Function: Pt will demonstrate ability to ambulate in comfortable walking shoe without significant gait abnormalities. Patient to be independent with home exercise program as demonstrated by performance with correct form without cues. LTG: (to be met in 12 treatments)  Pt will report ability to ambulate in a comfortable walking shoe for 2-3 hours without increased pain levels above 2-3/10 in order to improve tolerance with returning to work. Pt will demonstrate improved R LE weight bearing tolerance as evident by the ability to perform SL standing for 10 seconds. Pt will demonstrate improved functional activity tolerance as evident by an improved score on the FAAM to <25% functional impairment. Patient goals: \"better range of motion\"    Pt. Education:  [x] Yes  [] No  [x] Reviewed Prior HEP/Ed  Method of Education: [x] Verbal  [x] Demo  [] Written  Comprehension of Education:  [x] Verbalizes understanding. [] Demonstrates understanding. [] Needs review. [x] Demonstrates/verbalizes HEP/Ed previously given.     Access Code: [email or web address]  URL: [email or web address]  Date: 12/08/2022  Prepared by: Chuck Her     Exercises  Supine Hamstring Stretch with Strap - 2 x daily - 7 x weekly - 1 sets - 3 reps - 30 hold  Long Sitting Calf Stretch with Strap - 2 x daily - 7 x weekly - 1 sets - 3 reps - 30 hold  Supine Ankle Dorsiflexion and Plantarflexion AROM - 2 x daily - 7 x weekly - 1 sets - 20 reps  Supine Ankle Inversion and Eversion AROM - 2 x daily - 7 x weekly - 1 sets - 20 reps  Ankle Alphabet in Elevation - 2 x daily - 7 x weekly - 1 sets - 1 reps  Seated Toe Raise - 2 x daily - 7 x weekly - 2 sets - 10 reps  Seated Heel Raise - 2 x daily - 7 x weekly - 2 sets - 10 reps        Plan: [x] Continue per plan of care.    [] Other:      Treatment Charges: Mins Units   []  Modalities     [x]  Ther Exercise 35 2   [x]  Manual Therapy 10 1   []  Ther Activities     []  Aquatics     []  Neuromuscular     [] Vasocompression     [] Gait Training     [] Dry needling        [] 1 or 2 muscles        [] 3 or more muscles     []  Other     Total Treatment time 45 3     Time In: 11:20 am            Time Out: 12:05 am    Electronically signed by:  Krishna Cano PTA

## 2022-12-29 ENCOUNTER — HOSPITAL ENCOUNTER (OUTPATIENT)
Dept: PHYSICAL THERAPY | Age: 47
Setting detail: THERAPIES SERIES
Discharge: HOME | End: 2022-12-29
Payer: COMMERCIAL

## 2022-12-29 PROCEDURE — 97110 THERAPEUTIC EXERCISES: CPT

## 2022-12-29 PROCEDURE — 97140 MANUAL THERAPY 1/> REGIONS: CPT

## 2022-12-29 NOTE — FLOWSHEET NOTE
[] [facility]) - [facility] & Therapy  [address]  [place]: [phone]   F: [phone]    Physical Therapy Daily Treatment Note      Date:  2022  Patient Name:  Kerri Alexander    :  1975  MRN: [id number]  Physician: Dr. Myrlene Severs, MD                              Insurance: [place] ([phone], HARD MAX)  Medical Diagnosis: Z96.9 - Retained orthopedic hardware               Rehab Codes: M79.671, M25.571, M62.81, R26.89  Onset date: surgery 2022                    Next Dr's appt. : 2022  Visit# / total visits:   Cancels/No Shows: 0/0    Subjective:    Pain:  [] Yes  [x] No Location:  N/A Pain Rating: (0-10 scale) 0/10  Pain altered Tx:  [x] No  [] Yes  Action:  Comments: Patient continues to report stiffness. Pt reports highest pain of 4/10 recently.      Objective:  Modalities:   Precautions: Weightbearing as tolerated in the cam boot, may wean out of the boot as tolerated, does not have to sleep in the cam boot; surgery 2022  Exercises:  Exercise Reps/ Time Weight/ Level Completed  Today Comments   Long Sitting Gastroc Stretch 30\"x3  x    Supine HS + DF stretch 30\"x3  x    Long Sitting AROM 20x ea Yellow DF, PF  DF, PF, Inver, Ever   Plantarflexion Stretch 3x30\"  x    Posterior DF mob on mat table 15x5\"             Seated BAPS 20x ea L1  Inver/Ever, PF/DF, CW, CCW          Mini squats 10x2      Step through with focus on DF and toe off 10x      Heel Raises 10x2  x    Lateral Step ups with 3\" SLS  20x  8\" x    SL standing 4x10\"  x    TRX Assisted Squats 10x2  x    Treadmill 5 min  x    Other: Manual: STM to R peroneals, tib anterior, Grade III subtalor mobs, cross friction massage around incision  x 10 min    Specific Instructions for next treatment:    Completed by primary PT 22 ROM  ° A/P STRENGTH     Left Right Left Right   Ankle DF (knee straight) 8 8 5/5 5/5   DF (knee bent) 10 9       PF 32 26 5/5 4+/5   INV 42 26 5/5 4+/5   EVER 5 2 5/5 5/5   GTE 40 36              Assessment: [x] Progressing toward goals: Began treatment session with manual therapy to improve R ankle mobility followed by exercises as charted above. Time spent on reassessment at this date with pt demonstrating global improvements in ankle ROM and strength. Added SL standing with pt requiring slight upper extremity support in order to maintain. Pt continues to demonstrate slow gait pattern with decreased R LE push off evident. Finished treatment session with pt ambulating on the treadmill with cues to push through her R LE and for bigger stride length. Pt demonstrating good carry over, reporting she can feel the stretch and increased weight bearing on her R side. [] No change. [] Other:    [x] Patient would continue to benefit from skilled physical therapy services in order to: improve R ankle strength and mobility in order to improve gait mechanics and tolerance to prolonged walking, standing and lifting of objects in order to return to work. STG: (to be met in 6 treatments)  ? Pain: Pt will report decreased R ankle pain to <6/10 with progression of ambulation in a comfortable walking shoe. - MET 12/29/22, 4/10 max  ? ROM: Pt will increase R ankle AROM to WNL when compared to L ankle in order to improve gait mechanics and tolerance to prolonged walking and standing. - Ongoing 12/29/2022  ? Strength: Pt will increase R ankle strength to 5/5 globally in order to improve tolerance to prolonged walking, standing and lifting for work related tasks. - Progress made 12/29/22  ? Function: Pt will demonstrate ability to ambulate in comfortable walking shoe without significant gait abnormalities. - Ongoing 12/29/22  Patient to be independent with home exercise program as demonstrated by performance with correct form without cues.  - MET 12/29/22  LTG: (to be met in 12 treatments)  Pt will report ability to ambulate in a comfortable walking shoe for 2-3 hours without increased pain levels above 2-3/10 in order to improve tolerance with returning to work. Pt will demonstrate improved R LE weight bearing tolerance as evident by the ability to perform SL standing for 10 seconds. Pt will demonstrate improved functional activity tolerance as evident by an improved score on the FAAM to <25% functional impairment. Patient goals: \"better range of motion\"    Pt. Education:  [x] Yes  [] No  [x] Reviewed Prior HEP/Ed  Method of Education: [x] Verbal  [x] Demo  [] Written  Comprehension of Education:  [x] Verbalizes understanding. [] Demonstrates understanding. [] Needs review. [x] Demonstrates/verbalizes HEP/Ed previously given. Access Code: [email or web address]  URL: [email or web address]  Date: 12/08/2022  Prepared by: Pola Thurman     Exercises  Supine Hamstring Stretch with Strap - 2 x daily - 7 x weekly - 1 sets - 3 reps - 30 hold  Long Sitting Calf Stretch with Strap - 2 x daily - 7 x weekly - 1 sets - 3 reps - 30 hold  Supine Ankle Dorsiflexion and Plantarflexion AROM - 2 x daily - 7 x weekly - 1 sets - 20 reps  Supine Ankle Inversion and Eversion AROM - 2 x daily - 7 x weekly - 1 sets - 20 reps  Ankle Alphabet in Elevation - 2 x daily - 7 x weekly - 1 sets - 1 reps  Seated Toe Raise - 2 x daily - 7 x weekly - 2 sets - 10 reps  Seated Heel Raise - 2 x daily - 7 x weekly - 2 sets - 10 reps        Plan: [x] Continue per plan of care.    [] Other:      Treatment Charges: Mins Units   []  Modalities     [x]  Ther Exercise 24 2   [x]  Manual Therapy 10 1   []  Ther Activities     []  Aquatics     []  Neuromuscular     [] Vasocompression     [x] Gait Training 5 --   [] Dry needling        [] 1 or 2 muscles        [] 3 or more muscles     []  Other     Total Treatment time 39 3     Time In: 11:20 am            Time Out: 12:00 pm    Electronically signed by:  Pola Thurman, PT

## 2023-01-04 ENCOUNTER — HOSPITAL ENCOUNTER (OUTPATIENT)
Dept: PHYSICAL THERAPY | Age: 48
Setting detail: THERAPIES SERIES
Discharge: HOME | End: 2023-01-04

## 2023-01-04 NOTE — FLOWSHEET NOTE
[] [address]  [place]: [phone]   F: [phone]     Physical Therapy Cancel/No Show note    Date: 2023  Patient: Linda Lara  : 1975  MRN: [id number]    Visit Count:   Cancels/No Shows to date:     For today's appointment patient:    [x]  Cancelled    [] Rescheduled appointment    [] No-show     Reason given by patient:    [x]  Patient ill    []  Conflicting appointment    [] No transportation      [] Conflict with work    [] No reason given    [] Weather related    [] [id number]    [] Other:      Comments:        [] Next appointment was confirmed    Electronically signed by: Michael Cochran PTA

## 2023-01-06 ENCOUNTER — HOSPITAL ENCOUNTER (OUTPATIENT)
Dept: PHYSICAL THERAPY | Age: 48
Setting detail: THERAPIES SERIES
Discharge: HOME | End: 2023-01-06
Payer: COMMERCIAL

## 2023-01-06 DIAGNOSIS — Z96.9 RETAINED ORTHOPEDIC HARDWARE: Primary | ICD-10-CM

## 2023-01-06 NOTE — FLOWSHEET NOTE
[] [address]  [place]: [phone]   F: [phone]     Physical Therapy Cancel/No Show note    Date: 2023  Patient: Indu Gaspar  : 1975  MRN: [id number]    Visit Count:   Cancels/No Shows to date:     For today's appointment patient:    [x]  Cancelled    [] Rescheduled appointment    [] No-show     Reason given by patient:    [x]  Patient ill    []  Conflicting appointment    [] No transportation      [] Conflict with work    [] No reason given    [] Weather related    [] [id number]    [] Other:      Comments:        [] Next appointment was confirmed    Electronically signed by: Johnny Corley PTA

## 2023-01-09 ENCOUNTER — OFFICE VISIT (OUTPATIENT)
Dept: ORTHOPEDIC SURGERY | Age: 48
End: 2023-01-09

## 2023-01-09 VITALS — BODY MASS INDEX: 32.82 KG/M2 | OXYGEN SATURATION: 100 % | RESPIRATION RATE: 16 BRPM | WEIGHT: 197 LBS | HEIGHT: 65 IN

## 2023-01-09 DIAGNOSIS — Z96.9 RETAINED ORTHOPEDIC HARDWARE: Primary | ICD-10-CM

## 2023-01-09 PROCEDURE — 99024 POSTOP FOLLOW-UP VISIT: CPT | Performed by: ORTHOPAEDIC SURGERY

## 2023-01-09 NOTE — PROGRESS NOTES
Gerry Bustillo [facility] 2.  [address] [address]  Dept: [phone]    Ambulatory Orthopedic Postoperative Visit     Preoperative Diagnosis:   Right ankle retained hardware   Right ankle peroneal tendonitis   History of insulin-dependent diabetes, currently in remission secondary gastric bypass surgery  Body mass index is 31.95 kg/m². Postoperative Diagnosis:   Same      Procedures Performed:   (11/16/2022)  Right ankle removal of hardware  Right peroneus brevis debridement and tenolysis  Right peroneus longus tenolysis         SUBJECTIVE:     The patient returns post op from the above stated procedure. Reports doing well overall, reports improved pain, denies wound drainage/issues, fevers/chills/night sweats, calf swelling/pain, chest pain, shortness of breath. She is ambulating today without a brace or assistive device. OBJECTIVE:   Resp 16   Ht 5' 5\" (1.651 m)   Wt 197 lb (89.4 kg)   LMP 06/29/2016 (Approximate)   SpO2 100%   BMI 32.78 kg/m²    NAD, resting comfortably  Incisions clean/dry/intact, no erythema/dehiscence/drainage  Sensation to light touch grossly intact throughout  Warm and well perfused  Grossly neurovascularly intact distally  No signs of infection  No calf swelling/tenderness  -Painless ankle range of motion  -No significant periincisional tenderness      RADIOLOGY:   1/9/2023 FINDINGS:  Three weightbearing views (AP, Mortise, and Lateral) of the right ankle were obtained in the office today and reviewed, revealing no acute fracture, dislocation, or radioopaque foreign body/tumor. Radiographic evidence of prior hardware status post interval removal, without significant underlying change otherwise. IMPRESSION:  No acute fracture/dislocation.     Electronically signed by Daniel Armijo MD      ASSESSMENT AND PLAN:     8 weeks s/p above, doing well overall        She has a history of right ankle pain, with retained hardware and peroneal tendinopathy, as well as some underlying posttraumatic ankle arthritis. She also has a history of a right great toe fracture of the distal phalanx, sustained on 1/6/2022. She has healed well with conservative management. Notably, she has the past medical history as above. She has a history of insulin-dependent diabetes (she now reports that she does not take insulin and her diabetes is in remission, after she had a gastric bypass surgery). Precautions:                -Weightbearing as tolerated             [x]  Physical Therapy/Home exercises        Immobilization:      []  Splint/Cast                      []  CAM boot              [x]  Comfortable shoe    []  Other:                 DVT ppx:   [x]  Early mobilization             []  Medication as prescribed (Aspirin 325 mg PO q Day)                   [x]  No chemical ppx needed; mechanical only              -     Pain control:  Medication as prescribed (dosing and quantity) indicated for acute postoperative pain control              -     Special concerns:       []         [x]  Avoid strenuous activity/pain provoking maneuvers and high-impact repetitive exercises     -We discussed that she will remain off work until her next visit here. At the 6-week he, I anticipate returning her to work but with a lifting restriction of no greater than 20 pounds. At the 12-week he, I anticipate returning her to work full duty without restriction.   -We also discussed that if she would like her work restrictions changed/modified, she will call us and we will adjust her work restrictions as needed. All questions were answered and the patient agrees with the above plan. The patient will return to clinic in 6 weeks without x-rays. No follow-ups on file. No orders of the defined types were placed in this encounter. No orders of the defined types were placed in this encounter.         Latesha Hodge, MD  Orthopedic Surgery        Please excuse any typos/errors, as this note was created with the assistance of voice recognition software. While intending to generate a document that actually reflects the content of the visit, the document can still have some errors including those of syntax and sound-a-like substitutions which may escape proof reading. In such instances, actual meaning can be extrapolated by context.

## 2023-01-09 NOTE — LETTER
[address]  [facility] [address] [address]  Phone: [phone]  Fax: [phone]    Mat Agosto MD        January 9, 2023     Patient: Meredith Vance   YOB: 1975   Date of Visit: 1/9/2023       To Whom It May Concern:     Rafael Scheuermann can return to work with the following restrictions: No lifting/pulling/carrying/or pulling anything greater than 20 pounds. These restrictions will continue for approximately 6 weeks. If you have any questions or concerns, please don't hesitate to call.     Sincerely,  The office of Dr. Rebekah Paulino MD

## 2023-01-10 ENCOUNTER — HOSPITAL ENCOUNTER (OUTPATIENT)
Dept: PHYSICAL THERAPY | Age: 48
Setting detail: THERAPIES SERIES
End: 2023-01-10
Payer: COMMERCIAL

## 2023-01-12 ENCOUNTER — HOSPITAL ENCOUNTER (OUTPATIENT)
Dept: PHYSICAL THERAPY | Age: 48
Setting detail: THERAPIES SERIES
Discharge: HOME | End: 2023-01-12
Payer: COMMERCIAL

## 2023-01-12 PROCEDURE — 97140 MANUAL THERAPY 1/> REGIONS: CPT

## 2023-01-12 PROCEDURE — 97110 THERAPEUTIC EXERCISES: CPT

## 2023-01-12 NOTE — FLOWSHEET NOTE
[] [facility]) - [facility] & Therapy  [address]  [place]: [phone]   F: [phone]    Physical Therapy Daily Treatment Note      Date:  2023  Patient Name:  Amena Comer    :  1975  MRN: [id number]  Physician: Dr. Edouard Olmedo MD                              Insurance: Johanny Batres ([phone], [employer])  Medical Diagnosis: Z96.9 - Retained orthopedic hardware               Rehab Codes: M79.671, M25.571, M62.81, R26.89  Onset date: surgery 2022                    Next Dr's appt. : 2022  Visit# / total visits:   Cancels/No Shows: 3/0    Subjective:    Pain:  [] Yes  [x] No Location:  N/A Pain Rating: (0-10 scale) 0/10  Pain altered Tx:  [x] No  [] Yes  Action:  Comments: Patient arrives reporting improvements in her R ankle stiffness. Pt with evident improvements in gait upon arrival. Pt reports that she followed up with Dr. Juan C Delcid and he is clearing her to work with a 20# push/pull restriction. Pt reports that the only thing she is unsure about for work is the prolonged standing.       Objective:  Modalities:   Precautions: Weightbearing as tolerated in the cam boot, may wean out of the boot as tolerated, does not have to sleep in the cam boot; surgery 2022  Exercises:  Exercise Reps/ Time Weight/ Level Completed  Today Comments   Long Sitting Gastroc Stretch 30\"x3  x    Supine HS + DF stretch 30\"x3  x    Long Sitting AROM 20x ea Yellow DF, PF  DF, PF, Inver, Ever   Plantarflexion Stretch 3x30\"  x    Posterior DF mob on mat table 15x5\"             Seated BAPS 20x ea L1  Inver/Ever, PF/DF, CW, CCW          Mini squats 10x2      Step through with focus on DF and toe off 10x      Heel Raises 10x2  x    Heel taps 10x2 2\" x    Lateral Step ups with 3\" SLS  20x  8\" x    SL standing 4x10\"  x    TRX Assisted Squats 10x2  x    Treadmill 5 min      Other: Manual: STM to R peroneals, tib anterior, Grade III subtalor mobs, cross friction massage around incision x 10 min    Specific Instructions for next treatment:    Completed by primary PT 12/29/22 ROM  ° A/P STRENGTH     Left Right Left Right   Ankle DF (knee straight) 8 8 5/5 5/5   DF (knee bent) 10 9       PF 32 26 5/5 4+/5   INV 42 26 5/5 4+/5   EVER 5 2 5/5 5/5   GTE 40 36              Assessment: [x] Progressing toward goals: Began treatment session with manual therapy to improve R ankle mobility followed by exercises as charted above. Added heel taps at this date to work on terminal DF and global control. Held ambulation on the treadmill at the end of today's treatment session due to significant improvements in gait mechanics. Plan to hold PT following today's treatment session for patient to trial independent HEP as she returns to work. Pt will call to reschedule if she experiences any increased pain or difficulty. [] No change. [] Other:    [x] Patient would continue to benefit from skilled physical therapy services in order to: improve R ankle strength and mobility in order to improve gait mechanics and tolerance to prolonged walking, standing and lifting of objects in order to return to work. STG: (to be met in 6 treatments)  ? Pain: Pt will report decreased R ankle pain to <6/10 with progression of ambulation in a comfortable walking shoe. - MET 12/29/22, 4/10 max  ? ROM: Pt will increase R ankle AROM to WNL when compared to L ankle in order to improve gait mechanics and tolerance to prolonged walking and standing. - Ongoing 12/29/2022  ? Strength: Pt will increase R ankle strength to 5/5 globally in order to improve tolerance to prolonged walking, standing and lifting for work related tasks. - Progress made 12/29/22  ? Function: Pt will demonstrate ability to ambulate in comfortable walking shoe without significant gait abnormalities. - MET 1/12/23  Patient to be independent with home exercise program as demonstrated by performance with correct form without cues.  - MET 12/29/22  LTG: (to be met in 12 treatments)  Pt will report ability to ambulate in a comfortable walking shoe for 2-3 hours without increased pain levels above 2-3/10 in order to improve tolerance with returning to work. - Ongoing 1/12/2023 secondary to patient not standing for prolonged periods of time currently  Pt will demonstrate improved R LE weight bearing tolerance as evident by the ability to perform SL standing for 10 seconds. - Ongoing 1/12/2023, able to perform for 7 seconds  Pt will demonstrate improved functional activity tolerance as evident by an improved score on the FAAM to <25% functional impairment. - MET 1/12/2023, currently 1.2% functional impairment (83/84)                    Patient goals: \"better range of motion\"    Pt. Education:  [x] Yes  [] No  [x] Reviewed Prior HEP/Ed  Method of Education: [x] Verbal  [x] Demo  [] Written  Comprehension of Education:  [x] Verbalizes understanding. [] Demonstrates understanding. [] Needs review. [x] Demonstrates/verbalizes HEP/Ed previously given. Access Code: [email or web address]  URL: [email or web address]  Date: 12/08/2022  Prepared by: Maco Beck     Exercises  Supine Hamstring Stretch with Strap - 2 x daily - 7 x weekly - 1 sets - 3 reps - 30 hold  Long Sitting Calf Stretch with Strap - 2 x daily - 7 x weekly - 1 sets - 3 reps - 30 hold  Supine Ankle Dorsiflexion and Plantarflexion AROM - 2 x daily - 7 x weekly - 1 sets - 20 reps  Supine Ankle Inversion and Eversion AROM - 2 x daily - 7 x weekly - 1 sets - 20 reps  Ankle Alphabet in Elevation - 2 x daily - 7 x weekly - 1 sets - 1 reps  Seated Toe Raise - 2 x daily - 7 x weekly - 2 sets - 10 reps  Seated Heel Raise - 2 x daily - 7 x weekly - 2 sets - 10 reps        Plan: [x] Continue per plan of care.    [x] Other: hold PT following today's treatment session to trial independent HEP      Treatment Charges: Mins Units   []  Modalities     [x]  Ther Exercise 28 2   [x]  Manual Therapy 10 1   []  Ther Activities     [] Aquatics     []  Neuromuscular     [] Vasocompression     [] Gait Training     [] Dry needling        [] 1 or 2 muscles        [] 3 or more muscles     []  Other     Total Treatment time 38 3     Time In: 11:34 am            Time Out: 12:12 pm    Electronically signed by:  Cristal Giarrd PT

## 2023-02-13 ENCOUNTER — OFFICE VISIT (OUTPATIENT)
Dept: ORTHOPEDIC SURGERY | Age: 48
End: 2023-02-13

## 2023-02-13 VITALS — WEIGHT: 197 LBS | RESPIRATION RATE: 12 BRPM | BODY MASS INDEX: 32.82 KG/M2 | HEIGHT: 65 IN

## 2023-02-13 DIAGNOSIS — Z96.9 RETAINED ORTHOPEDIC HARDWARE: Primary | ICD-10-CM

## 2023-02-13 PROCEDURE — 99024 POSTOP FOLLOW-UP VISIT: CPT | Performed by: ORTHOPAEDIC SURGERY

## 2023-02-13 NOTE — PROGRESS NOTES
Gerry Bustillo [facility] 2.  [address] [address]  Dept: [phone]    Ambulatory Orthopedic Postoperative Visit     Preoperative Diagnosis:   Right ankle retained hardware   Right ankle peroneal tendonitis   History of insulin-dependent diabetes, currently in remission secondary gastric bypass surgery  Body mass index is 31.95 kg/m². Postoperative Diagnosis:   Same      Procedures Performed:   (11/16/2022)  Right ankle removal of hardware  Right peroneus brevis debridement and tenolysis  Right peroneus longus tenolysis         SUBJECTIVE:     The patient returns post op from the above stated procedure. Reports doing well overall, reports improved pain, denies wound drainage/issues, fevers/chills/night sweats, calf swelling/pain, chest pain, shortness of breath. She is ambulating today without a brace or assistive device. She reports that her ankle is doing better than before surgery, and having her hardware out as provided her pain relief. OBJECTIVE:   Resp 12   Ht 5' 5\" (1.651 m)   Wt 197 lb (89.4 kg)   LMP 06/29/2016 (Approximate)   BMI 32.78 kg/m²    NAD, resting comfortably  Incisions clean/dry/intact, no erythema/dehiscence/drainage  Sensation to light touch grossly intact throughout  Warm and well perfused  Grossly neurovascularly intact distally  No signs of infection  No calf swelling/tenderness  -Painless ankle range of motion  -No significant periincisional tenderness      RADIOLOGY:   2/13/2023 No new radiology images today. Prior images reviewed for reference. FINDINGS:  Three weightbearing views (AP, Mortise, and Lateral) of the right ankle were obtained in the office today and reviewed, revealing no acute fracture, dislocation, or radioopaque foreign body/tumor. Radiographic evidence of prior hardware status post interval removal, without significant underlying change otherwise. IMPRESSION:  No acute fracture/dislocation. Electronically signed by Alba Craig MD      ASSESSMENT AND PLAN:     12 weeks s/p above, doing well overall        She has a history of right ankle pain, with retained hardware and peroneal tendinopathy, as well as some underlying posttraumatic ankle arthritis. She also has a history of a right great toe fracture of the distal phalanx, sustained on 1/6/2022. She has healed well with conservative management. Notably, she has the past medical history as above. She has a history of insulin-dependent diabetes (she reports that she does not take insulin and her diabetes is in remission, after she had a gastric bypass surgery). Precautions:                -Weightbearing as tolerated             [x]  Physical Therapy/Home exercises        Immobilization:      []  Splint/Cast                      []  CAM boot              [x]  Comfortable shoe    []  Other:                 DVT ppx:   [x]  Early mobilization             []  Medication as prescribed (Aspirin 325 mg PO q Day)                   [x]  No chemical ppx needed; mechanical only              -     Pain control:  Medication as prescribed (dosing and quantity) indicated for acute postoperative pain control              -     Special concerns:       []         [x]  Avoid strenuous activity/pain provoking maneuvers and high-impact repetitive exercises     -She reports that she is back working full duty without restriction, we discussed that she may call our office if/when      All questions were answered and the patient agrees with the above plan. The patient will return to clinic in the future as needed         No follow-ups on file. No orders of the defined types were placed in this encounter. No orders of the defined types were placed in this encounter.         Henry Sullivan MD  Orthopedic Surgery        Please excuse any typos/errors, as this note was created with the assistance of voice recognition software. While intending to generate a document that actually reflects the content of the visit, the document can still have some errors including those of syntax and sound-a-like substitutions which may escape proof reading. In such instances, actual meaning can be extrapolated by context.

## 2024-01-16 ENCOUNTER — OFFICE VISIT (OUTPATIENT)
Age: 49
End: 2024-01-16

## 2024-01-16 VITALS — BODY MASS INDEX: 30.92 KG/M2 | HEIGHT: 67 IN | WEIGHT: 197 LBS

## 2024-01-16 DIAGNOSIS — M19.011 ARTHRITIS OF RIGHT ACROMIOCLAVICULAR JOINT: ICD-10-CM

## 2024-01-16 DIAGNOSIS — M25.511 RIGHT SHOULDER PAIN, UNSPECIFIED CHRONICITY: Primary | ICD-10-CM

## 2024-01-16 DIAGNOSIS — M75.81 TENDINITIS OF RIGHT ROTATOR CUFF: ICD-10-CM

## 2024-01-16 RX ADMIN — METHYLPREDNISOLONE ACETATE 80 MG: 80 INJECTION, SUSPENSION INTRA-ARTICULAR; INTRALESIONAL; INTRAMUSCULAR; SOFT TISSUE at 10:52

## 2024-01-16 RX ADMIN — LIDOCAINE HYDROCHLORIDE 2 ML: 10 INJECTION, SOLUTION INFILTRATION; PERINEURAL at 10:51

## 2024-01-16 NOTE — PROGRESS NOTES
pleasant 48 y.o. female who has chronic right shoulder pain, likely related to acromioclavicular arthritis as well as some shoulder impingement/rotator cuff pathology. Discussed treatment options including AC joint cortisone injection today in addition to physical therapy. If symptoms fail to improve after 6 weeks of PT, will consider MRI to further evaluate. Patient agreeable to this plan. Order for PT sent. After the skin was cleansed with alcohol I injected 40 mg of Depo-Medrol and local anesthetic into the right acromioclavicular joint.  Cortisone injection risks include infection, hyperglycemia, post injection pain. Ice/heat, NSAIDs, Tylenol, activity modification as needed for pain. Patient to follow up in 6-8 weeks for re-evaluation.        Attending Physician Statement   I have seen and discussed the care of Liliana Ferrera  including pertinent history and exam findings, with Debbie Yee NP. I have reviewed the key elements of all parts of the encounter with the nurse practitioner at the time of the encounter. I either performed the key elements of the history and physical exam myself or was physically present while the nurse practitioner performed this. I agree with the assessment, plan and orders as documented by the nurse practitioner.     Efrain Nicole MD   1/16/2024       Past History:    Current Outpatient Medications:     aspirin 325 MG EC tablet, Take 1 tablet by mouth daily (Patient not taking: Reported on 12/22/2023), Disp: 42 tablet, Rfl: 0    Handicap Placard MISC, by Does not apply route 11/15/2022-2/16/2023, Disp: 1 each, Rfl: 0    citalopram (CELEXA) 10 MG tablet, Take 20 mg by mouth daily, Disp: , Rfl:   Allergies   Allergen Reactions    Morphine Nausea And Vomiting     Social History     Socioeconomic History    Marital status:      Spouse name: Not on file    Number of children: Not on file    Years of education: Not on file    Highest education level: Not on file   Occupational

## 2024-01-17 RX ORDER — LIDOCAINE HYDROCHLORIDE 10 MG/ML
2 INJECTION, SOLUTION INFILTRATION; PERINEURAL ONCE
Status: COMPLETED | OUTPATIENT
Start: 2024-01-17 | End: 2024-01-16

## 2024-01-17 RX ORDER — METHYLPREDNISOLONE ACETATE 80 MG/ML
80 INJECTION, SUSPENSION INTRA-ARTICULAR; INTRALESIONAL; INTRAMUSCULAR; SOFT TISSUE ONCE
Status: COMPLETED | OUTPATIENT
Start: 2024-01-17 | End: 2024-01-16

## 2024-01-24 ENCOUNTER — HOSPITAL ENCOUNTER (OUTPATIENT)
Age: 49
Setting detail: THERAPIES SERIES
Discharge: HOME | End: 2024-01-24
Attending: ORTHOPAEDIC SURGERY
Payer: COMMERCIAL

## 2024-01-24 PROCEDURE — 97161 PT EVAL LOW COMPLEX 20 MIN: CPT

## 2024-01-24 PROCEDURE — 97110 THERAPEUTIC EXERCISES: CPT

## 2024-01-29 ENCOUNTER — HOSPITAL ENCOUNTER (OUTPATIENT)
Age: 49
Setting detail: THERAPIES SERIES
Discharge: HOME | End: 2024-01-29
Attending: ORTHOPAEDIC SURGERY
Payer: COMMERCIAL

## 2024-01-29 PROCEDURE — 97110 THERAPEUTIC EXERCISES: CPT

## 2024-01-29 PROCEDURE — 97140 MANUAL THERAPY 1/> REGIONS: CPT

## 2024-01-29 NOTE — FLOWSHEET NOTE
[facility] Rehabilitation &  Therapy  [address], [address]  [facility] [address]  [phone]  F: [phone]     Physical Therapy Daily Treatment Note    Date:  2024  Patient Name:  Liliana Ferrera    :  1975  MRN: [id number]    Physician: Efrain Camacho MD                              Insurance: TNA  $900 DED/20% 60/60 VISITS (Physical therapy ONLY)   Medical Diagnosis: (R) shoulder pain M25.511                   Rehab Codes: shoulder pain M25.511  Onset Date: referral 24             Next 's appt: 3/5/24 orthopedic physician       Visit# / total visits: ;     Cancels/No Shows: 0/0    Subjective:    Pain:  [x] Yes  [] No Location: right shoulder  5/10 worst Pain Rating: (0-10 scale) 1-2/10  Pain altered Tx:  [x] No  [] Yes  Action:    Comments:    Pt reports that her right shoulder pain is only 1-2/10 today but states that it was an \"easy day\" at work today.  More physical days sx can provoke to 4-5/10.        Objective:  localized point-tenderness to right supraspinatus insertion.  Sx presentation consistant with right shoulder impingement, poor shoulder complex biomechanics.Rounded shoulders with protracted c-spine    Today’s Treatment:    Modalities:   Precautions: none      Exercise Reps/ Time Weight/ Level Comments   UBE   4 min     fwd/retro   Upper trapezius stretch seated 3 x 20\"   Added OP from arm if needed for more stretch   UT stretch with right UE distraction 20\" x 4  Seated on mat, right UE holding edge of table, active left cervical lateral flexion and left trunk flexion    Shoulder extension at stall bars 15\" x 5    passive stretch     Shoulder extension with wand  10 x 5\"         scapular depression   3\" hold x 10     blue acepow loop 4th bar from to stall bar    Scapular retraction with resisted shoulder ER 3\" hold x 15  Elbows at side 90 degrees elbow flexion, maintain scapular retraction throughout          Right AC mobs 5\" x 5

## 2024-01-31 ENCOUNTER — HOSPITAL ENCOUNTER (OUTPATIENT)
Age: 49
Setting detail: THERAPIES SERIES
Discharge: HOME | End: 2024-01-31
Attending: ORTHOPAEDIC SURGERY
Payer: COMMERCIAL

## 2024-01-31 PROCEDURE — 97140 MANUAL THERAPY 1/> REGIONS: CPT

## 2024-01-31 PROCEDURE — 97110 THERAPEUTIC EXERCISES: CPT

## 2024-01-31 NOTE — FLOWSHEET NOTE
Right AC mobs 5\" x 5 Grade II A/P   Right shoulder mobs 10\" x 10   5 x 20\" Grade IV A/P, distraction   Right shoulder PROM 5\" hold x 10  Flexion and ABD    Postural education 10 min  Scapular setting, improved shoulder biomechanics, impingement precautions          Other:    Response to treatment:  Pt tolerated session well despite provoked sx, reduction of right shoulder pain to 5/10, 4/10 post ice. Localized right shoulder tenderness throughout session with minimal sx provocation.     Treatment Charges: Mins Units   []  Modalities     [x]  Ther Exercise 30 2   [x]  Manual Therapy 15 1   []  Ther Activities     []  Neuro Re-ed     []  Vasocompression     [] Gait     []  Other     Total billable time 45 3       Assessment: [x] Progressing toward goals.    [] No change.     [] Other:  [x] Patient would continue to benefit from skilled physical therapy services in order to work on tendon remodeling, improve mechanics and decrease compression of anterior joint with use.     Pt. Education:  [x] Plans/Goals, Risks/Benefits discussed  [x] Home exercise program  Method of Education: [x] Verbal  [] Demo  [x] Written  Comprehension of Education:  [x] Verbalizes understanding.  [] Demonstrates understanding.  [] Needs Review.  [] Demonstrates/verbalizes understanding of HEP/Ed previously given.    STG: (to be met in 8 treatments)  ? Pain: Improved pain levels to 1-2/10 to help with generally improved function  ? ROM: full flexion without pain, Apleys IR to 1\" deficit or better to help with reaching behind back  ? Strength: 4+/5 for shoulder flexion, abduction, shoulder IR  ? Function: able to lift up to 15# with minimal to no pain, improved sleeping tolerance by 75% or better, improved tolerance of overhead reaching to first level of cabinets without significant pain  Independent with Home Exercise Programs     LTG: (to be met in 12 treatments)  ? Pain: Improved pain levels to 0-1/10 to help with generally improved

## 2024-02-14 ENCOUNTER — HOSPITAL ENCOUNTER (OUTPATIENT)
Age: 49
Setting detail: THERAPIES SERIES
Discharge: HOME | End: 2024-02-14
Attending: ORTHOPAEDIC SURGERY
Payer: COMMERCIAL

## 2024-02-14 PROCEDURE — 97110 THERAPEUTIC EXERCISES: CPT

## 2024-02-14 PROCEDURE — 97140 MANUAL THERAPY 1/> REGIONS: CPT

## 2024-02-14 NOTE — FLOWSHEET NOTE
Strength: 4+/5 for shoulder flexion, abduction, shoulder IR  ? Function: able to lift up to 20# with minimal to no pain, improved sleeping tolerance by 90% or better, improved tolerance of overhead reaching as needed without pain  Independent with Home Exercise Programs      Plan: [x] Continue current frequency toward long and short term goals.                [x] Specific Instructions for subsequent treatments: add/progress shoulder stabilization, scapular stabilization within pain levels       Frequency: 2-3 x/week for 12 visits       Time In: 1715       Time Out: 1810    Electronically signed by:  Daniel Aguirre PTA

## 2024-02-21 ENCOUNTER — HOSPITAL ENCOUNTER (OUTPATIENT)
Age: 49
Setting detail: THERAPIES SERIES
Discharge: HOME | End: 2024-02-21
Attending: ORTHOPAEDIC SURGERY
Payer: COMMERCIAL

## 2024-02-21 PROCEDURE — 97110 THERAPEUTIC EXERCISES: CPT

## 2024-02-21 PROCEDURE — 97140 MANUAL THERAPY 1/> REGIONS: CPT

## 2024-02-21 NOTE — FLOWSHEET NOTE
[facility] Rehabilitation &  Therapy  [address]  [place] [address]  [phone]  F: [phone]     Physical Therapy Daily Treatment Note    Date:  2024  Patient Name:  Liliana Ferrera    :  1975  MRN: [id number]    Physician: Efrain Camacho MD                              Insurance: TNA  $900 DED/20% 60/60 VISITS (Physical therapy ONLY)   Medical Diagnosis: (R) shoulder pain M25.511                   Rehab Codes: shoulder pain M25.511  Onset Date: referral 24             Next 's appt: 3/5/24 orthopedic physician       Visit# / total visits: ;     Cancels/No Shows: 0/0    Subjective:    Pain:  [x] Yes  [] No Location: right shoulder   Pain Rating: (0-10 scale) 2/10  Pain altered Tx:  [x] No  [] Yes  Action:    Comments:    Pt reports that her shoulder soreness averages 2/10, but provokes with reaching motions forward and overhead.      Objective:  some right UT muscle atrophy noted with point-tenderness, will investigate further next treatment   Improved scapular setting and posture throughout session with session activities     Today’s Treatment:    Modalities: ice to anterior and lateral right shoulder 10 min post stretch. Pt declined-will ice at home if needed.   Precautions: none    Exercise Reps/ Time Weight/ Level Comments   UBE   4 min     fwd/retro   Upper trapezius stretch seated 3 x 20\"   Added OP from arm if needed for more stretch   UT stretch with right UE distraction 20\" x 4  Seated on mat, right UE holding edge of table, active left cervical lateral flexion and left trunk flexion    Shoulder extension at stall bars 15\" x 5    passive stretch    Shoulder extension with wand  10 x 5\"        scapular depression   3\" hold x 10     blue acepow loop 4th bar from to stall bar    Shoulder extension X 20 8#    Shoulder rows  X 20 8#    Scapular retraction with resisted shoulder ER 3\" hold x 15 Blue t-tube Elbows at side 90

## 2024-02-26 ENCOUNTER — HOSPITAL ENCOUNTER (OUTPATIENT)
Age: 49
Setting detail: THERAPIES SERIES
Discharge: HOME | End: 2024-02-26
Attending: ORTHOPAEDIC SURGERY
Payer: COMMERCIAL

## 2024-02-26 PROCEDURE — 97110 THERAPEUTIC EXERCISES: CPT

## 2024-02-26 PROCEDURE — 97140 MANUAL THERAPY 1/> REGIONS: CPT

## 2024-02-26 NOTE — FLOWSHEET NOTE
[facility]  [address], [address]  [place] [address]  [phone]  F: [phone]     Physical Therapy Daily Treatment Note    Date:  2024  Patient Name:  Liliana Ferrera    :  1975  MRN: [id number]    Physician: Efrain Camacho MD                              Insurance: TNA  $900 DED/20% 60/60 VISITS (Physical therapy ONLY)   Medical Diagnosis: (R) shoulder pain M25.511                   Rehab Codes: shoulder pain M25.511  Onset Date: referral 24             Next 's appt: 3/5/24 orthopedic physician       Visit# / total visits: ;     Cancels/No Shows: 0/0    Subjective:    Pain:  [] Yes  [x] No Location: right shoulder   Pain Rating: (0-10 scale) 0/10 presently, 3-4/10 with repeated lifting or reaching motions  Pain altered Tx:  [x] No  [] Yes  Action:    Comments:    Pt reports that her shoulder soreness continues to decrease. Still having some minor increases with work activities.      Objective:   Improved scapular setting and posture throughout session with session activities.  Tenderness with palpation over right bicipital groove    Today’s Treatment:    Modalities: ice to anterior and lateral right shoulder 10 min post stretch. Not today   Precautions: none    Exercise Reps/ Time Weight/ Level Comments   UBE   4 min     fwd/retro   Upper trapezius stretch seated 3 x 20\"   Added OP from arm if needed for more stretch   UT stretch with right UE distraction 20\" x 4  Seated on mat, right UE holding edge of table, active left cervical lateral flexion and left trunk flexion    Shoulder extension at stall bars 15\" x 5    passive stretch    Shoulder extension with wand  10 x 5\"        scapular depression   3\" hold x 10     blue acepow loop 4th bar from to stall bar    Shoulder extension X 20 8#    Shoulder rows  X 20 8#    Scapular retraction with resisted shoulder ER 3\" hold x 15 Blue t-tube Elbows at side 90 degrees elbow

## 2024-02-28 ENCOUNTER — HOSPITAL ENCOUNTER (OUTPATIENT)
Age: 49
Setting detail: THERAPIES SERIES
Discharge: HOME | End: 2024-02-28
Attending: ORTHOPAEDIC SURGERY
Payer: COMMERCIAL

## 2024-02-28 NOTE — FLOWSHEET NOTE
[facility]  [address]  [place] [address]  [phone]  F: [phone]     Physical Therapy Cancel/No Show note    Date: 2024  Patient: Lliiana Ferrera  : 1975  MRN: [id number]    Cancels/No Shows to date:     For today's appointment patient:    [x]  Cancelled    [] Rescheduled appointment    [] No-show     Reason given by patient:    [x]  Patient ill    []  Conflicting appointment    [] No transportation      [] Conflict with work    [] No reason given    [] Weather related    [] COVID-19    [] Other:      Comments:        [x] Next appointment was confirmed    Electronically signed by: Daniel Aguirre PTA

## 2024-03-06 ENCOUNTER — HOSPITAL ENCOUNTER (OUTPATIENT)
Age: 49
Setting detail: THERAPIES SERIES
Discharge: HOME | End: 2024-03-06
Attending: ORTHOPAEDIC SURGERY

## 2024-03-06 NOTE — FLOWSHEET NOTE
[facility]  [address]  [place] [address]  [phone]  F: [phone]     Physical Therapy Daily Treatment Note/cxl/NS    Date:  3/6/2024  Patient Name:  Liliana Ferrera    :  1975  MRN: [id number]    Physician: Efrain Camacho MD                              Insurance: [employer]  $900 DED/20% 60/60 VISITS (Physical therapy ONLY)   Medical Diagnosis: (R) shoulder pain M25.511                   Rehab Codes: shoulder pain M25.511  Onset Date: referral 24             Next 's appt: 3/5/24 orthopedic physician       Visit# / total visits: ;     Cancels/No Shows: 1/0    Cancelled today- ill.  Did not reschedule for next week but will call back when feeling better.      Electronically signed by:  Angel Pascal PT

## 2024-03-12 ENCOUNTER — OFFICE VISIT (OUTPATIENT)
Age: 49
End: 2024-03-12
Payer: COMMERCIAL

## 2024-03-12 VITALS — WEIGHT: 197 LBS | BODY MASS INDEX: 30.92 KG/M2 | HEIGHT: 67 IN

## 2024-03-12 DIAGNOSIS — M12.811 ROTATOR CUFF ARTHROPATHY OF RIGHT SHOULDER: ICD-10-CM

## 2024-03-12 DIAGNOSIS — M19.011 ARTHRITIS OF RIGHT ACROMIOCLAVICULAR JOINT: Primary | ICD-10-CM

## 2024-03-12 PROCEDURE — 99213 OFFICE O/P EST LOW 20 MIN: CPT | Performed by: ORTHOPAEDIC SURGERY

## 2024-03-12 NOTE — PROGRESS NOTES
On Celexa, pt. states stable at this time    Diabetes (HCC)     History of, improved after weight loss surgery per pt., not taking meds at this time (11/2/22)    Hypertension     Improved per pt. after weight loss surgery, no meds at this time (11/2/22)    Vertigo     Positional     Past Surgical History:   Procedure Laterality Date    ANKLE FRACTURE SURGERY Right     pins    CHOLECYSTECTOMY      FOOT SURGERY Right 11/16/2022    RIGHT ANKLE HARDWARE REMOVAL. RIGHT PERONEUS BREVIS DEBRIDEMENT AND TENOLYSIS. RIGHT PERONEUS LONGUS TENOLYSIS performed by Tomas Kenney MD at [facility] OR    GROWTH PLATE SURGERY      plate in head    HYSTERECTOMY (CERVIX STATUS UNKNOWN)      Partial; still has both ovaries     History reviewed. No pertinent family history.       Electronically signed by BENNY Klein CNP on 3/12/2024 at 2:23 PM     Please note that this chart was generated using voice recognition Dragon dictation software.  Although every effort was made to ensure the accuracy of this automated transcription, some errors in transcription may have occurred.

## 2024-03-21 ENCOUNTER — HOSPITAL ENCOUNTER (OUTPATIENT)
Dept: MRI IMAGING | Age: 49
Discharge: HOME | End: 2024-03-23
Attending: ORTHOPAEDIC SURGERY
Payer: COMMERCIAL

## 2024-03-21 DIAGNOSIS — M12.811 ROTATOR CUFF ARTHROPATHY OF RIGHT SHOULDER: ICD-10-CM

## 2024-03-21 PROCEDURE — 73221 MRI JOINT UPR EXTREM W/O DYE: CPT

## 2024-04-11 ENCOUNTER — OFFICE VISIT (OUTPATIENT)
Age: 49
End: 2024-04-11
Payer: COMMERCIAL

## 2024-04-11 VITALS — WEIGHT: 199 LBS | HEIGHT: 67 IN | BODY MASS INDEX: 31.23 KG/M2

## 2024-04-11 DIAGNOSIS — M67.929 BICEPS TENDINOPATHY, UNSPECIFIED LATERALITY: ICD-10-CM

## 2024-04-11 DIAGNOSIS — M75.121 COMPLETE TEAR OF RIGHT ROTATOR CUFF, UNSPECIFIED WHETHER TRAUMATIC: ICD-10-CM

## 2024-04-11 DIAGNOSIS — M19.011 ARTHRITIS OF RIGHT ACROMIOCLAVICULAR JOINT: Primary | ICD-10-CM

## 2024-04-11 DIAGNOSIS — M75.41 SUBACROMIAL IMPINGEMENT OF RIGHT SHOULDER: ICD-10-CM

## 2024-04-11 PROCEDURE — 99214 OFFICE O/P EST MOD 30 MIN: CPT | Performed by: ORTHOPAEDIC SURGERY

## 2024-04-11 NOTE — PROGRESS NOTES
[facility]      [facility]  [facility]OPA[facility]ICS AND SPORTS MEDICINE  [address]  [place] [address]  Dept: [phone]  Dept Fax: [phone]    Chief Compliant:  Chief Complaint   Patient presents with    Follow-up     Discuss MRI for R shoulder        History of Present Illness:  This is a pleasant 48 y.o. female who is here today to review her MRI of her right shoulder.  She has continued significant pain in the right shoulder.  She has a physical job working as a .    Physical Exam: Today she has some tenderness palpation of the biceps tendon and the AC joint.  Abnormal lift off. Abnormal belly press. 4/5 supraspinatus strength. 5/5 infraspinatus strength. 160 degrees of forward elevation equal to contralateral side. 70 degrees of external rotation with pain, 30 degrees of internal rotation. The left shoulder has 100 degrees of internal rotation and 100 degrees of external rotation.     Imaging: MRI images radiologist report and my own independent interpretation was discussed with her.  This shows a full-thickness supraspinatus tear.  Shows some proximal biceps tendinopathy.  It shows AC joint arthritis.  Also shows a downsloping acromion.      Assessment and Plan:    This is a pleasant 48 y.o. female who has right shoulder rotator cuff tear, proximal biceps tendinopathy, AC joint arthritis and subacromial impingement due to a downsloping acromion.  I recommend a right shoulder scope, rotator cuff repair, subacromial decompression, distal clavicle excision and proximal biceps tenodesis.  She has failed conservative treatment including physical therapy.  She did get some temporary relief of some of her pain due to a cortisone injection to the AC joint.  Risks of shoulder arthroscopy were discussed including postoperative pain, failure of repairs to heal.  Incomplete relief of symptoms, infection, injury to

## 2024-04-25 ENCOUNTER — TELEPHONE (OUTPATIENT)
Age: 49
End: 2024-04-25

## 2024-04-25 DIAGNOSIS — Z01.818 PRE-OP EXAM: Primary | ICD-10-CM

## 2024-04-25 NOTE — TELEPHONE ENCOUNTER
Liliana called today and said she wanted to move her surgery sooner than 7/26.  I have given her a new date of 5/17.  She verified that she needs PAT done.  I forgot to schedule her follow up so I'll go ahead and do that.  It will be in her my chart and on her discharge papers.

## 2024-05-07 ENCOUNTER — HOSPITAL ENCOUNTER (OUTPATIENT)
Age: 49
Discharge: HOME | End: 2024-05-07
Payer: COMMERCIAL

## 2024-05-07 DIAGNOSIS — Z01.818 PRE-OP EXAM: ICD-10-CM

## 2024-05-07 LAB
ANION GAP SERPL CALCULATED.3IONS-SCNC: 12 MMOL/L (ref 9–16)
BASOPHILS # BLD: <0.03 K/UL (ref 0–0.2)
BASOPHILS NFR BLD: 0 % (ref 0–2)
BUN SERPL-MCNC: 17 MG/DL (ref 6–20)
CALCIUM SERPL-MCNC: 9.1 MG/DL (ref 8.6–10.4)
CHLORIDE SERPL-SCNC: 104 MMOL/L (ref 98–107)
CO2 SERPL-SCNC: 24 MMOL/L (ref 20–31)
CREAT SERPL-MCNC: 0.7 MG/DL (ref 0.5–0.9)
EOSINOPHIL # BLD: 0.19 K/UL (ref 0–0.44)
EOSINOPHILS RELATIVE PERCENT: 3 % (ref 1–4)
ERYTHROCYTE [DISTWIDTH] IN BLOOD BY AUTOMATED COUNT: 12.8 % (ref 11.8–14.4)
GFR, ESTIMATED: >90 ML/MIN/1.73M2
GLUCOSE SERPL-MCNC: 166 MG/DL (ref 74–99)
HCT VFR BLD AUTO: 35.7 % (ref 36.3–47.1)
HGB BLD-MCNC: 11.2 G/DL (ref 11.9–15.1)
IMM GRANULOCYTES # BLD AUTO: <0.03 K/UL (ref 0–0.3)
IMM GRANULOCYTES NFR BLD: 0 %
LYMPHOCYTES NFR BLD: 1.88 K/UL (ref 1.1–3.7)
LYMPHOCYTES RELATIVE PERCENT: 31 % (ref 24–43)
MCH RBC QN AUTO: 27.1 PG (ref 25.2–33.5)
MCHC RBC AUTO-ENTMCNC: 31.4 G/DL (ref 28.4–34.8)
MCV RBC AUTO: 86.2 FL (ref 82.6–102.9)
MONOCYTES NFR BLD: 0.46 K/UL (ref 0.1–1.2)
MONOCYTES NFR BLD: 8 % (ref 3–12)
NEUTROPHILS NFR BLD: 58 % (ref 36–65)
NEUTS SEG NFR BLD: 3.44 K/UL (ref 1.5–8.1)
NRBC BLD-RTO: 0 PER 100 WBC
PLATELET # BLD AUTO: 207 K/UL (ref 138–453)
PMV BLD AUTO: 12.3 FL (ref 8.1–13.5)
POTASSIUM SERPL-SCNC: 4.5 MMOL/L (ref 3.7–5.3)
RBC # BLD AUTO: 4.14 M/UL (ref 3.95–5.11)
SODIUM SERPL-SCNC: 140 MMOL/L (ref 136–145)
WBC OTHER # BLD: 6 K/UL (ref 3.5–11.3)

## 2024-05-07 PROCEDURE — 80048 BASIC METABOLIC PNL TOTAL CA: CPT

## 2024-05-07 PROCEDURE — 93005 ELECTROCARDIOGRAM TRACING: CPT | Performed by: NURSE PRACTITIONER

## 2024-05-07 PROCEDURE — 36415 COLL VENOUS BLD VENIPUNCTURE: CPT

## 2024-05-07 PROCEDURE — 85025 COMPLETE CBC W/AUTO DIFF WBC: CPT

## 2024-05-07 NOTE — PROGRESS NOTES
DAY OF SURGERY/PROCEDURE  GUIDELINES    As a patient at the [facility], you can expect quality medical and nursing care that is centered on your individual needs. It is our goal to make your surgical experience as comfortable and excellent as possible.  ________________________________________________________________________    The following instructions are general guidelines, if any information on this sheet is different from what your doctor has instructed you to do, please follow your doctor's instructions.    Please arrive on 5/17/2024 @  0600      Enter through entrance C. Check in at registration     Upon arrival you will be taken to the pre-operative area to get ready for surgery, your family will stay in the waiting room and visit with you once you are ready for surgery. Due to special limitations please limit visitation to 1-2 members of your family at a time. When it is time for surgery your family will return to the waiting room.    Nothing to eat, drink, smoke, suck or chew after midnight (no water, gum, mints, cigarettes, cigars, pipes, snuff, chewing tobacco, etc.) or your surgery may be canceled.     Take a shower or bath on the morning of your surgery/procedure (Hibiclens if directed) Do not apply any lotions.    Brush your teeth, but do not swallow any water    IN CASE OF ILLNESS - If you have a cold or flu symptoms (high fever, runny nose, sore throat, cough, etc.) rash, nausea, vomiting, loose stools, and/or recent contact with someone who has a contagious disease (chick pox, measles, etc.) please call your doctor before coming to the surgery center    Take a small sip of water with heart, blood pressure, and/or seizure medication the morning of surgery.     If applicable bring your:  Inhaler (s)  Hearing aid(s)  Eyeglasses and Case (If you wear contacts they have to be removed before surgery, bring case and solution)  CPAP     DO NOT take anticoagulants (blood thinners,

## 2024-05-08 LAB
EKG ATRIAL RATE: 57 BPM
EKG P AXIS: 54 DEGREES
EKG P-R INTERVAL: 166 MS
EKG Q-T INTERVAL: 422 MS
EKG QRS DURATION: 88 MS
EKG QTC CALCULATION (BAZETT): 410 MS
EKG R AXIS: 6 DEGREES
EKG T AXIS: 28 DEGREES
EKG VENTRICULAR RATE: 57 BPM

## 2024-05-08 PROCEDURE — 93010 ELECTROCARDIOGRAM REPORT: CPT | Performed by: INTERNAL MEDICINE

## 2024-05-16 ENCOUNTER — ANESTHESIA EVENT (OUTPATIENT)
Dept: OPERATING ROOM | Age: 49
End: 2024-05-16
Payer: COMMERCIAL

## 2024-05-17 ENCOUNTER — ANESTHESIA (OUTPATIENT)
Dept: OPERATING ROOM | Age: 49
End: 2024-05-17
Payer: COMMERCIAL

## 2024-05-17 ENCOUNTER — HOSPITAL ENCOUNTER (OUTPATIENT)
Age: 49
Setting detail: OUTPATIENT SURGERY
Discharge: HOME | End: 2024-05-17
Attending: ORTHOPAEDIC SURGERY | Admitting: ORTHOPAEDIC SURGERY
Payer: COMMERCIAL

## 2024-05-17 VITALS
RESPIRATION RATE: 16 BRPM | HEIGHT: 67 IN | DIASTOLIC BLOOD PRESSURE: 75 MMHG | BODY MASS INDEX: 32.49 KG/M2 | WEIGHT: 207 LBS | OXYGEN SATURATION: 93 % | HEART RATE: 59 BPM | SYSTOLIC BLOOD PRESSURE: 153 MMHG | TEMPERATURE: 97.3 F

## 2024-05-17 DIAGNOSIS — Z96.9 RETAINED ORTHOPEDIC HARDWARE: Primary | ICD-10-CM

## 2024-05-17 DIAGNOSIS — G89.18 POST-OP PAIN: ICD-10-CM

## 2024-05-17 PROCEDURE — 3600000004 HC SURGERY LEVEL 4 BASE: Performed by: ORTHOPAEDIC SURGERY

## 2024-05-17 PROCEDURE — 6360000002 HC RX W HCPCS: Performed by: ORTHOPAEDIC SURGERY

## 2024-05-17 PROCEDURE — 6360000002 HC RX W HCPCS

## 2024-05-17 PROCEDURE — 2500000003 HC RX 250 WO HCPCS

## 2024-05-17 PROCEDURE — 3600000014 HC SURGERY LEVEL 4 ADDTL 15MIN: Performed by: ORTHOPAEDIC SURGERY

## 2024-05-17 PROCEDURE — 6360000002 HC RX W HCPCS: Performed by: ANESTHESIOLOGY

## 2024-05-17 PROCEDURE — 2580000003 HC RX 258: Performed by: ORTHOPAEDIC SURGERY

## 2024-05-17 PROCEDURE — 7100000001 HC PACU RECOVERY - ADDTL 15 MIN: Performed by: ORTHOPAEDIC SURGERY

## 2024-05-17 PROCEDURE — C1776 JOINT DEVICE (IMPLANTABLE): HCPCS | Performed by: ORTHOPAEDIC SURGERY

## 2024-05-17 PROCEDURE — 64415 NJX AA&/STRD BRCH PLXS IMG: CPT | Performed by: ANESTHESIOLOGY

## 2024-05-17 PROCEDURE — C9290 INJ, BUPIVACAINE LIPOSOME: HCPCS | Performed by: ANESTHESIOLOGY

## 2024-05-17 PROCEDURE — 2580000003 HC RX 258: Performed by: NURSE ANESTHETIST, CERTIFIED REGISTERED

## 2024-05-17 PROCEDURE — 2720000010 HC SURG SUPPLY STERILE: Performed by: ORTHOPAEDIC SURGERY

## 2024-05-17 PROCEDURE — 3700000001 HC ADD 15 MINUTES (ANESTHESIA): Performed by: ORTHOPAEDIC SURGERY

## 2024-05-17 PROCEDURE — 7100000010 HC PHASE II RECOVERY - FIRST 15 MIN: Performed by: ORTHOPAEDIC SURGERY

## 2024-05-17 PROCEDURE — 29824 SHO ARTHRS SRG DSTL CLAVICLC: CPT | Performed by: ORTHOPAEDIC SURGERY

## 2024-05-17 PROCEDURE — 29827 SHO ARTHRS SRG RT8TR CUF RPR: CPT | Performed by: ORTHOPAEDIC SURGERY

## 2024-05-17 PROCEDURE — C1713 ANCHOR/SCREW BN/BN,TIS/BN: HCPCS | Performed by: ORTHOPAEDIC SURGERY

## 2024-05-17 PROCEDURE — 7100000000 HC PACU RECOVERY - FIRST 15 MIN: Performed by: ORTHOPAEDIC SURGERY

## 2024-05-17 PROCEDURE — 29826 SHO ARTHRS SRG DECOMPRESSION: CPT | Performed by: ORTHOPAEDIC SURGERY

## 2024-05-17 PROCEDURE — 2709999900 HC NON-CHARGEABLE SUPPLY: Performed by: ORTHOPAEDIC SURGERY

## 2024-05-17 PROCEDURE — 3700000000 HC ANESTHESIA ATTENDED CARE: Performed by: ORTHOPAEDIC SURGERY

## 2024-05-17 PROCEDURE — 6360000002 HC RX W HCPCS: Performed by: NURSE ANESTHETIST, CERTIFIED REGISTERED

## 2024-05-17 PROCEDURE — 7100000011 HC PHASE II RECOVERY - ADDTL 15 MIN: Performed by: ORTHOPAEDIC SURGERY

## 2024-05-17 PROCEDURE — 2500000003 HC RX 250 WO HCPCS: Performed by: NURSE ANESTHETIST, CERTIFIED REGISTERED

## 2024-05-17 PROCEDURE — 6370000000 HC RX 637 (ALT 250 FOR IP)

## 2024-05-17 PROCEDURE — 23430 REPAIR BICEPS TENDON: CPT | Performed by: ORTHOPAEDIC SURGERY

## 2024-05-17 DEVICE — SP FBRTAK RC FBRTPE BLU & STTPE WH/BLK
Type: IMPLANTABLE DEVICE | Site: SHOULDER | Status: FUNCTIONAL
Brand: ARTHREX®

## 2024-05-17 DEVICE — ANCHOR SUT L14.7MM DIA5.5MM BIOCOMPOSITE W/ 3 SZ 2: Type: IMPLANTABLE DEVICE | Site: SHOULDER | Status: FUNCTIONAL

## 2024-05-17 DEVICE — ANCHOR SUTURE L 24.5 MM DIA 4.75 MM SUTURE SZ 2 B-TCP/ PEEK: Type: IMPLANTABLE DEVICE | Site: SHOULDER | Status: FUNCTIONAL

## 2024-05-17 DEVICE — SP FBRTAK RC FBRTPE BLK/BLU & STTPE BLU
Type: IMPLANTABLE DEVICE | Site: SHOULDER | Status: FUNCTIONAL
Brand: ARTHREX®

## 2024-05-17 DEVICE — KIT IMPL SYS PROX TENODESIS W/ BICEPSBUTTON INSRT FIBERLOOP: Type: IMPLANTABLE DEVICE | Site: SHOULDER | Status: FUNCTIONAL

## 2024-05-17 RX ORDER — FENTANYL CITRATE 50 UG/ML
100 INJECTION, SOLUTION INTRAMUSCULAR; INTRAVENOUS ONCE
Status: DISCONTINUED | OUTPATIENT
Start: 2024-05-17 | End: 2024-05-17 | Stop reason: HOSPADM

## 2024-05-17 RX ORDER — BUPIVACAINE HYDROCHLORIDE 5 MG/ML
INJECTION, SOLUTION EPIDURAL; INTRACAUDAL
Status: COMPLETED | OUTPATIENT
Start: 2024-05-17 | End: 2024-05-17

## 2024-05-17 RX ORDER — FENTANYL CITRATE 50 UG/ML
100 INJECTION, SOLUTION INTRAMUSCULAR; INTRAVENOUS
Status: DISCONTINUED | OUTPATIENT
Start: 2024-05-17 | End: 2024-05-17 | Stop reason: HOSPADM

## 2024-05-17 RX ORDER — MIDAZOLAM HYDROCHLORIDE 2 MG/2ML
2 INJECTION, SOLUTION INTRAMUSCULAR; INTRAVENOUS
Status: COMPLETED | OUTPATIENT
Start: 2024-05-17 | End: 2024-05-17

## 2024-05-17 RX ORDER — FENTANYL CITRATE 50 UG/ML
INJECTION, SOLUTION INTRAMUSCULAR; INTRAVENOUS
Status: DISCONTINUED
Start: 2024-05-17 | End: 2024-05-17 | Stop reason: HOSPADM

## 2024-05-17 RX ORDER — MIDAZOLAM HYDROCHLORIDE 1 MG/ML
INJECTION INTRAMUSCULAR; INTRAVENOUS
Status: DISCONTINUED
Start: 2024-05-17 | End: 2024-05-17 | Stop reason: HOSPADM

## 2024-05-17 RX ORDER — SODIUM CHLORIDE, SODIUM LACTATE, POTASSIUM CHLORIDE, CALCIUM CHLORIDE 600; 310; 30; 20 MG/100ML; MG/100ML; MG/100ML; MG/100ML
INJECTION, SOLUTION INTRAVENOUS CONTINUOUS PRN
Status: DISCONTINUED | OUTPATIENT
Start: 2024-05-17 | End: 2024-05-17 | Stop reason: SDUPTHER

## 2024-05-17 RX ORDER — MIDAZOLAM HYDROCHLORIDE 2 MG/2ML
2 INJECTION, SOLUTION INTRAMUSCULAR; INTRAVENOUS
Status: DISCONTINUED | OUTPATIENT
Start: 2024-05-17 | End: 2024-05-17 | Stop reason: HOSPADM

## 2024-05-17 RX ORDER — SCOLOPAMINE TRANSDERMAL SYSTEM 1 MG/1
1 PATCH, EXTENDED RELEASE TRANSDERMAL
Status: DISCONTINUED | OUTPATIENT
Start: 2024-05-17 | End: 2024-05-17 | Stop reason: HOSPADM

## 2024-05-17 RX ORDER — FENTANYL CITRATE 50 UG/ML
INJECTION, SOLUTION INTRAMUSCULAR; INTRAVENOUS PRN
Status: DISCONTINUED | OUTPATIENT
Start: 2024-05-17 | End: 2024-05-17 | Stop reason: SDUPTHER

## 2024-05-17 RX ORDER — ONDANSETRON 2 MG/ML
INJECTION INTRAMUSCULAR; INTRAVENOUS PRN
Status: DISCONTINUED | OUTPATIENT
Start: 2024-05-17 | End: 2024-05-17 | Stop reason: SDUPTHER

## 2024-05-17 RX ORDER — OXYCODONE HYDROCHLORIDE AND ACETAMINOPHEN 5; 325 MG/1; MG/1
1-2 TABLET ORAL EVERY 6 HOURS PRN
Qty: 40 TABLET | Refills: 0 | Status: SHIPPED | OUTPATIENT
Start: 2024-05-17 | End: 2024-05-24

## 2024-05-17 RX ORDER — SODIUM CHLORIDE 9 MG/ML
INJECTION, SOLUTION INTRAVENOUS PRN
Status: DISCONTINUED | OUTPATIENT
Start: 2024-05-17 | End: 2024-05-17 | Stop reason: HOSPADM

## 2024-05-17 RX ORDER — SODIUM CHLORIDE 0.9 % (FLUSH) 0.9 %
5-40 SYRINGE (ML) INJECTION EVERY 12 HOURS SCHEDULED
Status: DISCONTINUED | OUTPATIENT
Start: 2024-05-17 | End: 2024-05-17 | Stop reason: HOSPADM

## 2024-05-17 RX ORDER — NALOXONE HYDROCHLORIDE 0.4 MG/ML
INJECTION, SOLUTION INTRAMUSCULAR; INTRAVENOUS; SUBCUTANEOUS PRN
Status: DISCONTINUED | OUTPATIENT
Start: 2024-05-17 | End: 2024-05-17 | Stop reason: HOSPADM

## 2024-05-17 RX ORDER — ONDANSETRON 2 MG/ML
4 INJECTION INTRAMUSCULAR; INTRAVENOUS
Status: DISCONTINUED | OUTPATIENT
Start: 2024-05-17 | End: 2024-05-17 | Stop reason: HOSPADM

## 2024-05-17 RX ORDER — KETOROLAC TROMETHAMINE 30 MG/ML
INJECTION, SOLUTION INTRAMUSCULAR; INTRAVENOUS PRN
Status: DISCONTINUED | OUTPATIENT
Start: 2024-05-17 | End: 2024-05-17 | Stop reason: SDUPTHER

## 2024-05-17 RX ORDER — SODIUM CHLORIDE 0.9 % (FLUSH) 0.9 %
5-40 SYRINGE (ML) INJECTION PRN
Status: DISCONTINUED | OUTPATIENT
Start: 2024-05-17 | End: 2024-05-17 | Stop reason: HOSPADM

## 2024-05-17 RX ORDER — GLYCOPYRROLATE 0.2 MG/ML
INJECTION INTRAMUSCULAR; INTRAVENOUS PRN
Status: DISCONTINUED | OUTPATIENT
Start: 2024-05-17 | End: 2024-05-17 | Stop reason: SDUPTHER

## 2024-05-17 RX ORDER — DEXAMETHASONE SODIUM PHOSPHATE 10 MG/ML
INJECTION, SOLUTION INTRAMUSCULAR; INTRAVENOUS PRN
Status: DISCONTINUED | OUTPATIENT
Start: 2024-05-17 | End: 2024-05-17 | Stop reason: SDUPTHER

## 2024-05-17 RX ORDER — DIPHENHYDRAMINE HYDROCHLORIDE 50 MG/ML
12.5 INJECTION INTRAMUSCULAR; INTRAVENOUS
Status: DISCONTINUED | OUTPATIENT
Start: 2024-05-17 | End: 2024-05-17 | Stop reason: HOSPADM

## 2024-05-17 RX ORDER — LABETALOL HYDROCHLORIDE 5 MG/ML
10 INJECTION, SOLUTION INTRAVENOUS
Status: DISCONTINUED | OUTPATIENT
Start: 2024-05-17 | End: 2024-05-17 | Stop reason: HOSPADM

## 2024-05-17 RX ORDER — FAMOTIDINE 10 MG/ML
INJECTION, SOLUTION INTRAVENOUS
Status: COMPLETED
Start: 2024-05-17 | End: 2024-05-17

## 2024-05-17 RX ORDER — HYDRALAZINE HYDROCHLORIDE 20 MG/ML
10 INJECTION INTRAMUSCULAR; INTRAVENOUS
Status: DISCONTINUED | OUTPATIENT
Start: 2024-05-17 | End: 2024-05-17 | Stop reason: HOSPADM

## 2024-05-17 RX ORDER — CEFAZOLIN 2 G/1
INJECTION, POWDER, FOR SOLUTION INTRAMUSCULAR; INTRAVENOUS
Status: DISCONTINUED
Start: 2024-05-17 | End: 2024-05-17 | Stop reason: HOSPADM

## 2024-05-17 RX ORDER — ROCURONIUM BROMIDE 10 MG/ML
INJECTION, SOLUTION INTRAVENOUS PRN
Status: DISCONTINUED | OUTPATIENT
Start: 2024-05-17 | End: 2024-05-17 | Stop reason: SDUPTHER

## 2024-05-17 RX ORDER — OXYCODONE HYDROCHLORIDE AND ACETAMINOPHEN 5; 325 MG/1; MG/1
2 TABLET ORAL
Status: DISCONTINUED | OUTPATIENT
Start: 2024-05-17 | End: 2024-05-17 | Stop reason: HOSPADM

## 2024-05-17 RX ORDER — CEFAZOLIN SODIUM 2 G/50ML
SOLUTION INTRAVENOUS PRN
Status: DISCONTINUED | OUTPATIENT
Start: 2024-05-17 | End: 2024-05-17 | Stop reason: SDUPTHER

## 2024-05-17 RX ORDER — GLYCOPYRROLATE 0.2 MG/ML
0.4 INJECTION INTRAMUSCULAR; INTRAVENOUS ONCE
Status: DISCONTINUED | OUTPATIENT
Start: 2024-05-17 | End: 2024-05-17 | Stop reason: HOSPADM

## 2024-05-17 RX ORDER — IBUPROFEN 800 MG/1
800 TABLET ORAL
Qty: 90 TABLET | Refills: 0 | Status: SHIPPED | OUTPATIENT
Start: 2024-05-17

## 2024-05-17 RX ORDER — OXYCODONE HYDROCHLORIDE AND ACETAMINOPHEN 5; 325 MG/1; MG/1
1 TABLET ORAL
Status: DISCONTINUED | OUTPATIENT
Start: 2024-05-17 | End: 2024-05-17 | Stop reason: HOSPADM

## 2024-05-17 RX ORDER — PROPOFOL 10 MG/ML
INJECTION, EMULSION INTRAVENOUS PRN
Status: DISCONTINUED | OUTPATIENT
Start: 2024-05-17 | End: 2024-05-17 | Stop reason: SDUPTHER

## 2024-05-17 RX ORDER — METOCLOPRAMIDE HYDROCHLORIDE 5 MG/ML
10 INJECTION INTRAMUSCULAR; INTRAVENOUS
Status: DISCONTINUED | OUTPATIENT
Start: 2024-05-17 | End: 2024-05-17 | Stop reason: HOSPADM

## 2024-05-17 RX ORDER — MIDAZOLAM HYDROCHLORIDE 2 MG/2ML
2 INJECTION, SOLUTION INTRAMUSCULAR; INTRAVENOUS ONCE
Status: COMPLETED | OUTPATIENT
Start: 2024-05-17 | End: 2024-05-17

## 2024-05-17 RX ORDER — LIDOCAINE HYDROCHLORIDE 10 MG/ML
INJECTION, SOLUTION INFILTRATION; PERINEURAL PRN
Status: DISCONTINUED | OUTPATIENT
Start: 2024-05-17 | End: 2024-05-17 | Stop reason: SDUPTHER

## 2024-05-17 RX ORDER — SCOLOPAMINE TRANSDERMAL SYSTEM 1 MG/1
PATCH, EXTENDED RELEASE TRANSDERMAL
Status: DISCONTINUED
Start: 2024-05-17 | End: 2024-05-17 | Stop reason: HOSPADM

## 2024-05-17 RX ORDER — NEOSTIGMINE METHYLSULFATE 5 MG/5 ML
SYRINGE (ML) INTRAVENOUS PRN
Status: DISCONTINUED | OUTPATIENT
Start: 2024-05-17 | End: 2024-05-17 | Stop reason: SDUPTHER

## 2024-05-17 RX ORDER — MEPERIDINE HYDROCHLORIDE 50 MG/ML
12.5 INJECTION INTRAMUSCULAR; INTRAVENOUS; SUBCUTANEOUS EVERY 5 MIN PRN
Status: DISCONTINUED | OUTPATIENT
Start: 2024-05-17 | End: 2024-05-17 | Stop reason: HOSPADM

## 2024-05-17 RX ORDER — IPRATROPIUM BROMIDE AND ALBUTEROL SULFATE 2.5; .5 MG/3ML; MG/3ML
1 SOLUTION RESPIRATORY (INHALATION)
Status: DISCONTINUED | OUTPATIENT
Start: 2024-05-17 | End: 2024-05-17 | Stop reason: HOSPADM

## 2024-05-17 RX ORDER — MORPHINE SULFATE 2 MG/ML
2 INJECTION, SOLUTION INTRAMUSCULAR; INTRAVENOUS EVERY 5 MIN PRN
Status: DISCONTINUED | OUTPATIENT
Start: 2024-05-17 | End: 2024-05-17 | Stop reason: HOSPADM

## 2024-05-17 RX ADMIN — BUPIVACAINE HYDROCHLORIDE 10 ML: 5 INJECTION, SOLUTION EPIDURAL; INTRACAUDAL; PERINEURAL at 07:17

## 2024-05-17 RX ADMIN — PROPOFOL 200 MG: 10 INJECTION, EMULSION INTRAVENOUS at 07:25

## 2024-05-17 RX ADMIN — MIDAZOLAM HYDROCHLORIDE 2 MG: 1 INJECTION, SOLUTION INTRAMUSCULAR; INTRAVENOUS at 07:14

## 2024-05-17 RX ADMIN — FENTANYL CITRATE 50 MCG: 50 INJECTION, SOLUTION INTRAMUSCULAR; INTRAVENOUS at 08:51

## 2024-05-17 RX ADMIN — GLYCOPYRROLATE 0.2 MG: 0.2 INJECTION INTRAMUSCULAR; INTRAVENOUS at 09:39

## 2024-05-17 RX ADMIN — Medication 3 MG: at 09:52

## 2024-05-17 RX ADMIN — KETOROLAC TROMETHAMINE 30 MG: 30 INJECTION, SOLUTION INTRAMUSCULAR; INTRAVENOUS at 09:51

## 2024-05-17 RX ADMIN — FENTANYL CITRATE 100 MCG: 50 INJECTION, SOLUTION INTRAMUSCULAR; INTRAVENOUS at 07:25

## 2024-05-17 RX ADMIN — Medication 0.5 MG: at 10:22

## 2024-05-17 RX ADMIN — LIDOCAINE HYDROCHLORIDE 40 MG: 10 INJECTION, SOLUTION INFILTRATION; PERINEURAL at 07:25

## 2024-05-17 RX ADMIN — ONDANSETRON 4 MG: 2 INJECTION INTRAMUSCULAR; INTRAVENOUS at 09:51

## 2024-05-17 RX ADMIN — BUPIVACAINE 10 ML: 13.3 INJECTION, SUSPENSION, LIPOSOMAL INFILTRATION at 07:17

## 2024-05-17 RX ADMIN — ROCURONIUM BROMIDE 50 MG: 10 INJECTION, SOLUTION INTRAVENOUS at 07:25

## 2024-05-17 RX ADMIN — GLYCOPYRROLATE 0.4 MG: 0.2 INJECTION INTRAMUSCULAR; INTRAVENOUS at 09:52

## 2024-05-17 RX ADMIN — SODIUM CHLORIDE, POTASSIUM CHLORIDE, SODIUM LACTATE AND CALCIUM CHLORIDE: 600; 310; 30; 20 INJECTION, SOLUTION INTRAVENOUS at 07:25

## 2024-05-17 RX ADMIN — FENTANYL CITRATE 50 MCG: 50 INJECTION, SOLUTION INTRAMUSCULAR; INTRAVENOUS at 09:59

## 2024-05-17 RX ADMIN — CEFAZOLIN SODIUM 2000 MG: 2 SOLUTION INTRAVENOUS at 07:36

## 2024-05-17 RX ADMIN — DEXAMETHASONE SODIUM PHOSPHATE 10 MG: 10 INJECTION, SOLUTION INTRAMUSCULAR; INTRAVENOUS at 09:46

## 2024-05-17 RX ADMIN — FENTANYL CITRATE 50 MCG: 50 INJECTION, SOLUTION INTRAMUSCULAR; INTRAVENOUS at 09:37

## 2024-05-17 RX ADMIN — FENTANYL CITRATE 50 MCG: 50 INJECTION, SOLUTION INTRAMUSCULAR; INTRAVENOUS at 08:38

## 2024-05-17 RX ADMIN — ROCURONIUM BROMIDE 10 MG: 10 INJECTION, SOLUTION INTRAVENOUS at 08:10

## 2024-05-17 RX ADMIN — SODIUM CHLORIDE, POTASSIUM CHLORIDE, SODIUM LACTATE AND CALCIUM CHLORIDE: 600; 310; 30; 20 INJECTION, SOLUTION INTRAVENOUS at 08:21

## 2024-05-17 RX ADMIN — HYDROMORPHONE HYDROCHLORIDE 0.5 MG: 1 INJECTION, SOLUTION INTRAMUSCULAR; INTRAVENOUS; SUBCUTANEOUS at 10:22

## 2024-05-17 RX ADMIN — FAMOTIDINE 20 MG: 10 INJECTION, SOLUTION INTRAVENOUS at 06:57

## 2024-05-17 ASSESSMENT — PAIN DESCRIPTION - DESCRIPTORS
DESCRIPTORS: BURNING
DESCRIPTORS: ACHING
DESCRIPTORS: BURNING

## 2024-05-17 ASSESSMENT — PAIN DESCRIPTION - PAIN TYPE
TYPE: SURGICAL PAIN
TYPE: SURGICAL PAIN

## 2024-05-17 ASSESSMENT — PAIN SCALES - GENERAL
PAINLEVEL_OUTOF10: 7
PAINLEVEL_OUTOF10: 4
PAINLEVEL_OUTOF10: 4
PAINLEVEL_OUTOF10: 7

## 2024-05-17 ASSESSMENT — PAIN DESCRIPTION - LOCATION
LOCATION: SHOULDER
LOCATION: SHOULDER

## 2024-05-17 ASSESSMENT — PAIN - FUNCTIONAL ASSESSMENT: PAIN_FUNCTIONAL_ASSESSMENT: 0-10

## 2024-05-17 NOTE — DISCHARGE INSTRUCTIONS
Surgical bandage may be removed in 2 days down to the bare skin.  You may shower at that time.  Then place a dry bandage or bandaid over the incisions daily.  Keep the sling on or keep the elbow at the side at all times if the sling is taken off.  Wear the sling while you sleep.  No raising the arm overhead or out away from the body.  No lifting with the operative arm.  Place ice on the shoulder for 20-30 minutes throughout the day for pain and swelling control.      Call your doctor now or seek immediate medical care if:     You have pain that does not get better after you take pain medicine.   You have a fever over 101°F.   You have chills   You have signs of infection, such as:   Increased pain, swelling, warmth, or redness.   Red streaks leading from the incision.   Pus draining from the incision.     Activity  You have had anesthesia today  Do not drive, operate heavy equipment, consume alcoholic beverages, or make any important decisions  for 24 hours   If you are taking pain medication: Do not drive or consume alcohol.  Take your time changing positions today. You may feel light headed or dizzy if you move too quickly.   Continue your home medications as ordered by your physician.  Diet   You can eat your normal diet when you feel well. You should start off with bland foods like chicken soup, toast, or yogurt. Then advance as tolerated.  Drink plenty of fluids (unless your doctor tells you not to). Your urine should be very lightly colored without a strong odor.

## 2024-05-17 NOTE — OP NOTE
Operative Note      Patient: Liliana Ferrera  YOB: 1975  MRN: [id number]    Date of Procedure: 5/17/2024    Pre-Op Diagnosis Codes:     * Tear of right rotator cuff, unspecified tear extent, unspecified whether traumatic [M75.101]     * Biceps tendinopathy, right [M67.921]     * Subacromial impingement of right shoulder [M75.41]     * Arthritis of right acromioclavicular joint [M19.011]      Post-Op Diagnosis: Same with the addition of right shoulder subscapularis tendon tear       Procedures: #1 right shoulder arthroscopic supraspinatus tendon repair and right shoulder arthroscopic subscapularis tendon repair with a modifier 22 due to increased difficulty due to the addition of the subscapularis repair.  #3 right shoulder arthroscopic subacromial decompression #4 right shoulder arthroscopic distal clavicle excision #5 open subpectoral proximal biceps tenodesis    Surgeon(s):  Efrain Nicole MD    Assistant:   First Assistant: Rochelle Huddleston RN    Anesthesia: Regional    Estimated Blood Loss (mL): Minimal    Complications: None    Specimens:   * No specimens in log *    Implants:  Implant Name Type Inv. Item Serial No.  Lot No. LRB No. Used Action   ANCHOR SUT L14.7MM DIA5.5MM BIOCOMPOSITE W/ 3 SZ 2 - [id number]  ANCHOR SUT L14.7MM DIA5.5MM BIOCOMPOSITE W/ 3 SZ 2  [employer] [id number] Right 1 Implanted   ANCHOR BONE SP FBRTAK RC FBRTPE BLK/PRIMO  - [id number]  ANCHOR BONE SP FBRTAK RC FBRTPE BLK/PRIMO   [employer] [id number] Right 1 Implanted   ANCHOR BONE SP FBRTAK RC TGRTPE PRIMO  - [id number]  ANCHOR BONE SP FBRTAK RC TGRTPE PRIMO   [employer] [id number] Right 1 Implanted   ANCHOR SUTURE L 24.5 MM CATHI 4.75 MM SUTURE SZ 2 B-TCP/ PEEK - [id number]  ANCHOR SUTURE L 24.5 MM CATHI 4.75 MM SUTURE SZ 2 B-TCP/ PEEK  [employer] [id number] Right 1 Implanted   ANCHOR SUTURE L 24.5 MM CATHI 4.75 MM SUTURE SZ 2 B-TCP/ PEEK - [id number]  ANCHOR SUTURE L 24.5 MM CATHI 4.75 MM SUTURE SZ 2 B-TCP/ PEEK   range of motion.  I then performed an arthroscopic distal clavicle excision coming from the anterior portal using the electrocautery and the barrel bur removing enough distal clavicle so there was no further impingement.  I made an incision over the anterior axillary line.  I used the Bovie through the subcutaneous tissue.  I bluntly dissected with my finger underneath the pectoralis tendon.  I placed retractors.  I isolated the biceps tendon and pulled it out of the wound.  I prepared the biceps tendon with the fiber loop whipstitch starting at the musculotendinous junction.  The suture limbs were passed through the biceps button and 1 limb of suture was passed back through the biceps tendon.  I then drilled and placed a unicortical button.  The sutures were tied down for secure fixation.  The incisions were closed with suture and a sterile dry dressing was applied.  Sling was applied.  Sponge and needle counts were correct.  Modifier 22 was included because in addition to performing the rotator cuff repair of the supraspinatus I had to perform a separate repair with a separate anchor and had to make a separate incision placing a separate cannula and using a completely different technique to repair the subscapularis tendon.  This added time and complexity to the case.    Postoperative plan: She will be discharged home.  She will be nonweightbearing.  Remain in the sling except for hygiene.  Percocet and ibuprofen for pain control.  I will see her in 2 weeks.      Electronically signed by Efrain Nicole MD on 5/17/2024 at 10:10 AM

## 2024-05-17 NOTE — ANESTHESIA PRE PROCEDURE
Department of Anesthesiology  Preprocedure Note       Name:  Liliana Ferrera   Age:  49 y.o.  :  1975                                          MRN:  [id number]         Date:  2024      Surgeon: Surgeon(s):  Efrain Nicole MD    Procedure: Procedure(s):  RIGHT SHOULDER ARTHROSCOPY ROTATOR CUFF REPAIR WITH ARTHREX SUBACROMIAL DECOMPRESSION, BICEPS TENODESIS  RIGHT SHOULDER ARTHROSCOPY DISTAL CLAVICLE RESECTION    Medications prior to admission:   Prior to Admission medications    Medication Sig Start Date End Date Taking? Authorizing Provider   aspirin 325 MG EC tablet Take 1 tablet by mouth daily  Patient not taking: Reported on 2023  Tomas Kenney MD   Handicap Placard MISC by Does not apply route 11/15/2022-2023 11/10/22   Tomas Kenney MD   citalopram (CELEXA) 10 MG tablet Take 2 tablets by mouth daily    Provider, MD Urszula       Current medications:    Current Facility-Administered Medications   Medication Dose Route Frequency Provider Last Rate Last Admin   • fentaNYL (SUBLIMAZE) injection 100 mcg  100 mcg IntraVENous Once PRN Cele Martinez MD       • famotidine (PEPCID) 20 mg in sodium chloride (PF) 0.9 % 10 mL injection  20 mg IntraVENous Once Cele Martinez MD       • scopolamine (TRANSDERM-SCOP) transdermal patch 1 patch  1 patch TransDERmal Q72H Cele Martinez MD   1 patch at 24 0656   • sodium chloride flush 0.9 % injection 5-40 mL  5-40 mL IntraVENous 2 times per day Cele Martinez MD       • sodium chloride flush 0.9 % injection 5-40 mL  5-40 mL IntraVENous PRN Cele Martinez MD       • 0.9 % sodium chloride infusion   IntraVENous PRN Cele Martinez MD       • midazolam PF (VERSED) injection 2 mg  2 mg IntraVENous Once PRN Cele Martinez MD       • fentaNYL (SUBLIMAZE) 100 MCG/2ML injection            • midazolam (VERSED) 2 MG/2ML injection            • ceFAZolin (ANCEF) 2 g injection            • BUPivacaine liposome (EXPAREL) 1.3 % injection                Allergies:

## 2024-05-17 NOTE — ANESTHESIA PROCEDURE NOTES
Peripheral Block    Patient location during procedure: pre-op  Reason for block: post-op pain management and at surgeon's request  Start time: 5/17/2024 7:16 AM  End time: 5/17/2024 7:17 AM  Staffing  Performed: anesthesiologist   Anesthesiologist: Chalino Allison MD  Performed by: Chalino Allison MD  Authorized by: Chalino Allison MD    Preanesthetic Checklist  Completed: patient identified, IV checked, site marked, risks and benefits discussed, surgical/procedural consents, equipment checked, pre-op evaluation, timeout performed, anesthesia consent given, oxygen available, monitors applied/VS acknowledged, fire risk safety assessment completed and verbalized and blood product R/B/A discussed and consented  Peripheral Block   Patient position: sitting  Prep: ChloraPrep  Provider prep: mask and sterile gloves  Patient monitoring: cardiac monitor, continuous pulse ox, frequent blood pressure checks, IV access, oxygen and responsive to questions  Block type: Brachial plexus  Interscalene  Laterality: right  Injection technique: single-shot  Guidance: nerve stimulator and ultrasound guided    Needle   Needle type: insulated echogenic nerve stimulator needle   Needle gauge: 22 G  Needle localization: anatomical landmarks, nerve stimulator and ultrasound guidance  Needle length: 2 IN.  Assessment   Injection assessment: negative aspiration for heme, no paresthesia on injection, local visualized surrounding nerve on ultrasound and no intravascular symptoms  Paresthesia pain: none  Slow fractionated injection: yes  Hemodynamics: stable  Outcomes: uncomplicated and patient tolerated procedure well    Additional Notes  (+) RIGHT DELTOID TWITCH FROM 1.5MA DOWN TO 0.7MA  Medications Administered  BUPivacaine (MARCAINE) PF injection 0.5% - Perineural   10 mL - 5/17/2024 7:17:00 AM  BUPivacaine liposome (EXPAREL) injection 1.3% - Perineural   10 mL - 5/17/2024 7:17:00 AM

## 2024-05-17 NOTE — H&P
the patient.   REVIEW OF SYSTEMS:   Constitution: negative for fever, chills    Physical Exam  BP (!) 150/89   Pulse 56   Temp 97.3 °F (36.3 °C) (Temporal)   Resp 14   Ht 1.702 m (5' 7\")   Wt 93.9 kg (207 lb)   LMP 06/29/2016 (Approximate)   SpO2 98%   BMI 32.42 kg/m²    General Appearance: in no distress  HEENT: Normocephalic  CV: brisk cap refill to extremities  Lungs: Nonlabored breathing  Abdomen: soft  Extremities: right shoulder skin intact    Assessment and Plan  Liliana Ferrera is a 49 y.o. old female with right shoulder rotator cuff tear, subacromial impingement, AC joint arthritis, biceps tendinopathy.  Plan is for right shoulder scope, rotator cuff repair, subacromial decompression, distal clavicle excision, proximal biceps tenodesis.    This note is created with the assistance of a speech recognition program.  While intending to generate a document that actually reflects the content of the visit, the document can still have some errors including those of syntax and sound a like substitutions which may escape proof reading.  It such instances, actual meaning can be extrapolated by contextual diversion.

## 2024-05-17 NOTE — ANESTHESIA POSTPROCEDURE EVALUATION
Department of Anesthesiology  Postprocedure Note    Patient: Liliana Ferrera  MRN: [id number]  YOB: 1975  Date of evaluation: 5/17/2024    Procedure Summary       Date: 05/17/24 Room / Location: [address]    Anesthesia Start: 0721 Anesthesia Stop: 1013    Procedures:       RIGHT SHOULDER ARTHROSCOPY ROTATOR CUFF REPAIR WITH ARTHREX SUBACROMIAL DECOMPRESSION, BICEPS TENODESIS, DISTAL CLAVICAL EXCISION, SUBSCAPULARIS REPAIR (Right)      RIGHT SHOULDER ARTHROSCOPY DISTAL CLAVICLE RESECTION (Right) Diagnosis:       Tear of right rotator cuff, unspecified tear extent, unspecified whether traumatic      Biceps tendinopathy, right      Subacromial impingement of right shoulder      Arthritis of right acromioclavicular joint      (Tear of right rotator cuff, unspecified tear extent, unspecified whether traumatic [M75.101])      (Biceps tendinopathy, right [M67.921])      (Subacromial impingement of right shoulder [M75.41])      (Arthritis of right acromioclavicular joint [M19.011])    Surgeons: Efrain Nicole MD Responsible Provider: Chalino Allison MD    Anesthesia Type: general, regional ASA Status: 1            Anesthesia Type: No value filed.    Brisa Phase I: Brisa Score: 8    Brisa Phase II:      Anesthesia Post Evaluation    Patient location during evaluation: PACU  Patient participation: complete - patient participated  Level of consciousness: awake and alert  Airway patency: patent  Nausea & Vomiting: no nausea and no vomiting  Cardiovascular status: hemodynamically stable  Respiratory status: room air and spontaneous ventilation  Hydration status: euvolemic  Multimodal analgesia pain management approach  Pain management: adequate    No notable events documented.

## 2024-05-30 ENCOUNTER — OFFICE VISIT (OUTPATIENT)
Age: 49
End: 2024-05-30

## 2024-05-30 VITALS — WEIGHT: 207 LBS | HEIGHT: 67 IN | BODY MASS INDEX: 32.49 KG/M2

## 2024-05-30 DIAGNOSIS — M75.121 COMPLETE TEAR OF RIGHT ROTATOR CUFF, UNSPECIFIED WHETHER TRAUMATIC: Primary | ICD-10-CM

## 2024-05-30 PROCEDURE — 99024 POSTOP FOLLOW-UP VISIT: CPT | Performed by: ORTHOPAEDIC SURGERY

## 2024-05-30 NOTE — PROGRESS NOTES
[facility] Orthopedics & Sports Medicine      [facility] PHYSICIANS [facility]  [facility] ORTHOPAEDICS AND SPORTS MEDICINE  [address]  [place] [address]  Dept: [phone]  Dept Fax: [phone]    Chief Compliant:  Chief Complaint   Patient presents with    Post-Op Check     1st post op - R shoulder scope        History of Present Illness:  This is a pleasant 49 y.o. female who is 2 weeks status post right shoulder arthroscopic supraspinatus tendon repair and right shoulder arthroscopic subscapularis tendon repair, right shoulder arthroscopic subacromial decompression, right shoulder arthroscopic distal clavicle excision, open subpectoral proximal biceps tenodesis. She states overall her pain is slowly improving and she is taking less pain medication. She has been wearing her sling but states the abduction pillow causes more discomfort. Denies any issues with her incisions. Denies any numbness or tingling.     Physical Exam: Skin intact. Surgical incisions well approximated and healing without evidence of dehiscence, drainage or erythema. Full elbow range of motion.     Imaging: None obtained today.       Assessment and Plan:    This is a pleasant 49 y.o. female who is status post above. Sutures removed in office today. Work restrictions discussed. PT order placed. Continue to wear the sling, okay to remove at rest/hygiene. Plan to follow up in 4 weeks.      Attending Physician Statement   I have seen and discussed the care of Liliana Ferrera  including pertinent history and exam findings, with Debbie Yee NP. I have reviewed the key elements of all parts of the encounter with the nurse practitioner at the time of the encounter. I either performed the key elements of the history and physical exam myself or was physically present while the nurse practitioner performed this. I agree with the assessment, plan and orders as documented by the nurse practitioner.

## 2024-06-12 ENCOUNTER — HOSPITAL ENCOUNTER (OUTPATIENT)
Age: 49
Setting detail: THERAPIES SERIES
Discharge: HOME | End: 2024-06-12
Attending: ORTHOPAEDIC SURGERY
Payer: COMMERCIAL

## 2024-06-12 PROCEDURE — 97110 THERAPEUTIC EXERCISES: CPT

## 2024-06-12 PROCEDURE — 97162 PT EVAL MOD COMPLEX 30 MIN: CPT

## 2024-06-14 ENCOUNTER — HOSPITAL ENCOUNTER (OUTPATIENT)
Age: 49
Setting detail: THERAPIES SERIES
Discharge: HOME | End: 2024-06-14
Attending: ORTHOPAEDIC SURGERY
Payer: COMMERCIAL

## 2024-06-14 PROCEDURE — 97110 THERAPEUTIC EXERCISES: CPT

## 2024-06-14 NOTE — CONSULTS
presentation (progression) [] Stable [x] Evolving  [] Unstable   Decision Making [] Low [x] Moderate [] High    [] Low Complexity [x] Moderate Complexity [] High Complexity       Rehab Potential:  [] Good  [x] Fair  [] Poor   Suggested Professional Referral:  [x] No  [] Yes:  Barriers to Goal Achievement::  [x] No  [] Yes:  Domestic Concerns:  [x] No  [] Yes:    Pt. Education:  [x] Plans/Goals, Risks/Benefits discussed  [x] Home exercise program  Method of Education: [x] Verbal  [] Demo  [x] Written  Comprehension of Education:  [x] Verbalizes understanding.  [x] Demonstrates understanding.  [] Needs Review.  [] Demonstrates/verbalizes understanding of HEP/Ed previously given.    Exercises:GIVEN INITIALLY  Exercise Reps/ Time Weight/ Level Comments   Trapezius stretch 3 x 20\"     Reviewed codmans      Elbow flexion/extension AROM 15     Shrugs/rolls 15     PROM wand ER 10 x 5\"         Treatment Plan:  [x] Therapeutic Exercise   85435  [] Iontophoresis: 4 mg/mL Dexamethasone Sodium Phosphate  mAmin  60524   [] Therapeutic Activity  22813 [] Vasopneumatic cold with compression  95456    [] Gait Training   42540 [] Ultrasound   00194   [x] Neuromuscular Re-education  19782 [] Electrical Stimulation Unattended  86609   [x] Manual Therapy  28217 [] Electrical Stimulation Attended  24959   [x] Instruction in HEP  [] Lumbar/Cervical Traction  99490   [] Aquatic Therapy   42937 [] Cold/hotpack    [] Massage   02777      [] Dry Needling, 1 or 2 muscles  77185   [] Biofeedback, first 15 minutes   39328  [] Biofeedback, additional 15 minutes   09124 [] Dry Needling, 3 or more muscles  59213       []  Medication allergies reviewed for use of    Dexamethasone Sodium Phosphate 4mg/ml     with iontophoresis treatments.   Pt is not allergic.    Frequency:  2-3 x/week for 18 visits    Today’s Treatment:  Modalities:   Precautions:  Exercises:  Exercise Reps/ Time Weight/ Level Comments   Trapezius stretch 3 x 20\"     Reviewed

## 2024-06-14 NOTE — FLOWSHEET NOTE
[facility] Rehabilitation &  Therapy  [address], [address]  [place] [address]  [phone]  F: [phone]     Physical Therapy Daily Treatment Note    Date:  2024  Patient Name:  Liliana Ferrera    :  1975  MRN: [id number]  Physician: Efrain Nicole MD                                 Insurance: AETNA; PT HAS MET ;  DED/OOP $0 LIABILITY   Medical Diagnosis: (R) rotator cuff  M75.121                      Rehab Codes: (R) shoulder pain M25.511  Onset Date: referral 24; surgery 24                    Next 's appt.: 24  Visit Count:                                 Cancel/No Show: 0/0       Subjective:  Patient brought in shoulder pulleys that she has purchased online.  Very sore with transition from being fully flexed down to around 90 degrees area.  Pain:  [x] Yes  [] No   Location: (R) shoulder pain; diffusely Pain Rating: (0-10 scale) 2/10  Pain altered Tx:  [] Yes  [] No  Action:  Comments:         Objective:         Modalities: CP in sitting after treatment 10'  Precautions: rotator cuff repair standard, biceps tenodesis 6 wk no resisted elbow 6wk ; 8 wk 24; 10 wk 24  Exercises:  Exercise Reps/ Time Weight/ Level Comments   Trapezius stretch 3 x 20\"       pulleys  4'       Elbow flexion/extension AROM 15       Shrugs/rolls 15       PROM wand ER 10 x 5\"       PROM extension, flexion, ER seated    Response to treatment: Tolerated program well.  PROM improving overall with ability to get to ~ degrees passively with the pulleys today.      Treatment Charges: Mins Units   []  Modalities 10 0   []  Ther Exercise 25 2   []  Manual Therapy 15 1   []  Ther Activities     []  Neuro Re-ed     []  Vasocompression     [] Gait     []  Other     Total billable time 40 3       Assessment: [x] Progressing toward goals.    [] No change.     [] Other:  [x] Patient would continue to benefit from skilled physical therapy services in

## 2024-06-19 ENCOUNTER — HOSPITAL ENCOUNTER (OUTPATIENT)
Age: 49
Setting detail: THERAPIES SERIES
Discharge: HOME | End: 2024-06-19
Attending: ORTHOPAEDIC SURGERY
Payer: COMMERCIAL

## 2024-06-19 PROCEDURE — 97140 MANUAL THERAPY 1/> REGIONS: CPT

## 2024-06-19 PROCEDURE — 97110 THERAPEUTIC EXERCISES: CPT

## 2024-06-19 NOTE — FLOWSHEET NOTE
within protocol restrictions      Time In: 0800            Time Out: 0850    Electronically signed by:  Daniel Aguirre PTA

## 2024-06-21 ENCOUNTER — HOSPITAL ENCOUNTER (OUTPATIENT)
Age: 49
Setting detail: THERAPIES SERIES
Discharge: HOME | End: 2024-06-21
Attending: ORTHOPAEDIC SURGERY
Payer: COMMERCIAL

## 2024-06-21 PROCEDURE — 97110 THERAPEUTIC EXERCISES: CPT

## 2024-06-21 PROCEDURE — 97140 MANUAL THERAPY 1/> REGIONS: CPT

## 2024-06-21 NOTE — FLOWSHEET NOTE
[facility] Rehabilitation &  Therapy  [address], [address]  [place] [address]  [phone]  F: [phone]     Physical Therapy Daily Treatment Note    Date:  2024  Patient Name:  Liliana Ferrera    :  1975  MRN: [id number]  Physician: Efrain Nicole MD                                 Insurance: AETNA; PT HAS MET ;  DED/OOP $0 LIABILITY   Medical Diagnosis: (R) rotator cuff  M75.121                      Rehab Codes: (R) shoulder pain M25.511  Onset Date: referral 24; surgery 24                    Next 's appt.: 24    Visit Count:                                 Cancel/No Show: 0/0     Subjective:   Pt still making progress grossly with PROM at this time, has been doing pulleys at home regularly.  Asked about cutting grass, advised to follow restrictions due to vibration/use of shoulder.    Pain:  [x] Yes  [] No   Location: (R) shoulder pain; diffusely Pain Rating: (0-10 scale) 2/10  Pain altered Tx:  [] Yes  [] No  Action:    Comments:         Objective:         Modalities: CP A/P right shoulder in supine after treatment 10'    Precautions: rotator cuff repair standard, biceps tenodesis 6 wk no resisted elbow 6wk ; 8 wk 24; 10 wk 24    Exercise Reps/ Time Weight/ Level Comments   Trapezius stretch 3 x 20\"       pulleys 4 min        Elbow flexion/extension AROM 15       Shrugs/rolls 20 ea       PROM wand ER 10 x 5\"       A/P P/A shoulder mobs 10\" x 10 ea Grade 2    Right shoulder PROM  15 min   extension, flexion, ER supine    Towel stretch across 10 x 5\"     PROM extension, flexion, ER seated    Response to treatment: Tolerated PROM well, end range discomfort with stretching in all directions.  Added towel stretch across body- limited with anterior shoulder discomfort with this movement.  Sees physician next week- will send brief progress note and plan to do this next week.      Treatment Charges: Mins Units   [x]

## 2024-06-24 ENCOUNTER — HOSPITAL ENCOUNTER (OUTPATIENT)
Age: 49
Setting detail: THERAPIES SERIES
Discharge: HOME | End: 2024-06-24
Attending: ORTHOPAEDIC SURGERY
Payer: COMMERCIAL

## 2024-06-24 PROCEDURE — 97110 THERAPEUTIC EXERCISES: CPT

## 2024-06-24 PROCEDURE — 97140 MANUAL THERAPY 1/> REGIONS: CPT

## 2024-06-24 NOTE — FLOWSHEET NOTE
[facility] Rehabilitation &  Therapy  [address], [address]  [place] [address]  [phone]  F: [phone]     Physical Therapy Daily Treatment Note    Date:  2024  Patient Name:  Liliana Ferrera    :  1975  MRN: [id number]  Physician: Efrain Nicole MD                                 Insurance: AETNA; PT HAS MET ;  DED/OOP $0 LIABILITY   Medical Diagnosis: (R) rotator cuff  M75.121                      Rehab Codes: (R) shoulder pain M25.511  Onset Date: referral 24; surgery 24                    Next 's appt.: 24    Visit Count:                                 Cancel/No Show: 0/0     Subjective:   Pt reports minimal right shoulder pain at rest.  Still has pain/guarding with any right shoulder ABD movement.     Pain:  [x] Yes  [] No   Location: (R) shoulder pain; diffusely Pain Rating: (0-10 scale) 1-2/10  Pain altered Tx:  [] Yes  [] No  Action:    Comments:         Objective:  guarding with right shoulder ABD, even with passive initiation of movement.  Pt was able to tolerate some shoulder ABD when combined with passive chest press, once movement stopped, pt once again guarded causing pain.  Pt advised to use a small pillow or towel under her right elbow to slowly initiate passive shoulder ABD at her pace and tolerance to help inhibit guarding response.         Modalities: CP A/P right shoulder in supine after treatment 10'    Precautions: rotator cuff repair standard, biceps tenodesis 6 wk no resisted elbow 6wk ; 8 wk 24; 10 wk 24    Exercise Reps/ Time Weight/ Level Comments   Trapezius stretch 3 x 20\"       pulleys 4 min        Elbow flexion/extension AROM 15       Shrugs/rolls 20 ea       PROM wand ER 10 x 5\"       A/P P/A shoulder mobs 10\" x 10 ea Grade IV    Right shoulder PROM  15 min   extension, flexion, ER supine    Towel stretch across 10 x 5\"     PROM extension, flexion, ER supine     Response to treatment:

## 2024-06-26 ENCOUNTER — HOSPITAL ENCOUNTER (OUTPATIENT)
Age: 49
Setting detail: THERAPIES SERIES
Discharge: HOME | End: 2024-06-26
Attending: ORTHOPAEDIC SURGERY
Payer: COMMERCIAL

## 2024-06-26 PROCEDURE — 97140 MANUAL THERAPY 1/> REGIONS: CPT

## 2024-06-26 PROCEDURE — 97110 THERAPEUTIC EXERCISES: CPT

## 2024-06-26 NOTE — FLOWSHEET NOTE
INITIALLY  Exercise Reps/ Time Weight/ Level Comments   Trapezius stretch 3 x 20\"       Reviewed codmans         Elbow flexion/extension AROM 15       Shrugs/rolls 15       PROM wand ER 10 x 5\"           Plan: [x] Continue current frequency toward long and short term goals.    [x] Specific Instructions for subsequent treatments: progress ROM, PROM, AAROM, AROM, strength within protocol restrictions      Time In: 1700            Time Out: 1755    Electronically signed by:  Angel Pascal PT

## 2024-06-27 ENCOUNTER — OFFICE VISIT (OUTPATIENT)
Age: 49
End: 2024-06-27

## 2024-06-27 VITALS — WEIGHT: 207 LBS | BODY MASS INDEX: 32.49 KG/M2 | HEIGHT: 67 IN

## 2024-06-27 DIAGNOSIS — M75.121 COMPLETE TEAR OF RIGHT ROTATOR CUFF, UNSPECIFIED WHETHER TRAUMATIC: Primary | ICD-10-CM

## 2024-06-27 PROCEDURE — 99024 POSTOP FOLLOW-UP VISIT: CPT | Performed by: ORTHOPAEDIC SURGERY

## 2024-06-27 NOTE — PROGRESS NOTES
Occupational History    Not on file   Tobacco Use    Smoking status: Never    Smokeless tobacco: Never   Vaping Use    Vaping Use: Never used   Substance and Sexual Activity    Alcohol use: Not Currently    Drug use: No    Sexual activity: Not on file   Other Topics Concern    Not on file   Social History Narrative    Not on file     Social Determinants of Health     Financial Resource Strain: Not on file   Food Insecurity: Not on file   Transportation Needs: Not on file   Physical Activity: Not on file   Stress: Not on file   Social Connections: Not on file   Intimate Partner Violence: Not on file   Housing Stability: Not on file     Past Medical History:   Diagnosis Date    Arthritis     Right ankle with peroneal tendonitis    COVID-19 12/2021    and 9/2022. Mild both times, no breathing issues per pt.    Depression     On Celexa, pt. states stable at this time    Diabetes (HCC)     History of, improved after weight loss surgery per pt., not taking meds at this time (11/2/22)    Hypertension     Improved per pt. after weight loss surgery, no meds at this time (11/2/22)    Prolonged emergence from general anesthesia     Shoulder pain, right     Vertigo     Positional     Past Surgical History:   Procedure Laterality Date    ANKLE FRACTURE SURGERY Right     pins    CHOLECYSTECTOMY      FOOT SURGERY Right 11/16/2022    RIGHT ANKLE HARDWARE REMOVAL. RIGHT PERONEUS BREVIS DEBRIDEMENT AND TENOLYSIS. RIGHT PERONEUS LONGUS TENOLYSIS performed by Tomas Kenney MD at [facility] OR    GASTRIC BYPASS SURGERY      GROWTH PLATE SURGERY      plate in head    HYSTERECTOMY (CERVIX STATUS UNKNOWN)      Partial; still has both ovaries    SHOULDER ARTHROSCOPY Right 5/17/2024    RIGHT SHOULDER ARTHROSCOPY ROTATOR CUFF REPAIR WITH ARTHREX SUBACROMIAL DECOMPRESSION, BICEPS TENODESIS, DISTAL CLAVICAL EXCISION, SUBSCAPULARIS REPAIR performed by Efrain Nicole MD at [facility] OR    SHOULDER ARTHROSCOPY Right 5/17/2024    RIGHT

## 2024-07-01 ENCOUNTER — HOSPITAL ENCOUNTER (OUTPATIENT)
Age: 49
Setting detail: THERAPIES SERIES
Discharge: HOME | End: 2024-07-01
Attending: ORTHOPAEDIC SURGERY
Payer: COMMERCIAL

## 2024-07-01 PROCEDURE — 97140 MANUAL THERAPY 1/> REGIONS: CPT

## 2024-07-01 PROCEDURE — 97110 THERAPEUTIC EXERCISES: CPT

## 2024-07-01 NOTE — FLOWSHEET NOTE
[facility] Rehabilitation &  Therapy  [address], [address]  [place] [place] [address]  [phone]  F: [phone]     Physical Therapy Daily Treatment Note    Date:  2024  Patient Name:  Liliana Ferrera    :  1975  MRN: [id number]  Physician: Efrain Nicole MD                                 Insurance: AETNA; PT HAS MET ;  DED/OOP $0 LIABILITY   Medical Diagnosis: (R) rotator cuff  M75.121                      Rehab Codes: (R) shoulder pain M25.511  Onset Date: referral 24; surgery 24                    Next 's appt.: 24    Visit Count:                                 Cancel/No Show: 0/0     Subjective:   Pt reports Pain:  [x] Yes  [] No   Location: (R) shoulder pain; diffusely Pain Rating: (0-10 scale) 2-3/10  Pain altered Tx:  [] Yes  [x] No  Action:    Comments:  Pt reports continued progression, pain-level remains low.  Reports compliance at work and home activities with current protocol restrictions       Objective:      PROM seated:  ER 45 degrees  Flexion 110 degrees pulleys  Extension 50 degrees     Elbow flexion/extension full with end range discomfort with elbow extension in distal biceps     Did not test strength due to recency of surgery         Modalities: CP A/P right shoulder in supine after treatment 10'    Precautions: rotator cuff repair standard, biceps tenodesis 6 wk no resisted elbow 6wk ; 8 wk 24; 10 wk 24    Exercise Reps/ Time Weight/ Level Comments   Trapezius stretch 3 x 20\"       pulleys 4 min        Elbow flexion/extension AROM 15       Shrugs/rolls 20 ea       PROM wand ER 10 x 5\"       A/P P/A shoulder mobs 10\" x 10 ea Grade IV    Right shoulder PROM  15 min   extension, flexion, ER supine    Towel stretch across 10 x 5\"       PROM extension, flexion, ER supine     Response to treatment:  Pt tolerated session well, some minor discomfort with end range passive stretching, improved passive right

## 2024-07-03 ENCOUNTER — HOSPITAL ENCOUNTER (OUTPATIENT)
Age: 49
Setting detail: THERAPIES SERIES
Discharge: HOME | End: 2024-07-03
Attending: ORTHOPAEDIC SURGERY
Payer: COMMERCIAL

## 2024-07-03 PROCEDURE — 97110 THERAPEUTIC EXERCISES: CPT

## 2024-07-03 PROCEDURE — 97140 MANUAL THERAPY 1/> REGIONS: CPT

## 2024-07-03 NOTE — FLOWSHEET NOTE
Reviewed melchor         Elbow flexion/extension AROM 15       Shrugs/rolls 15       PROM wand ER 10 x 5\"           Plan: [x] Continue current frequency toward long and short term goals.    [x] Specific Instructions for subsequent treatments: progress ROM, PROM, AAROM, AROM, strength within protocol restrictions      Time In: 1715           Time Out: 1800    Electronically signed by:  Daniel Aguirre PTA

## 2024-07-08 ENCOUNTER — HOSPITAL ENCOUNTER (OUTPATIENT)
Age: 49
Setting detail: THERAPIES SERIES
Discharge: HOME | End: 2024-07-08
Attending: ORTHOPAEDIC SURGERY
Payer: COMMERCIAL

## 2024-07-08 PROCEDURE — 97110 THERAPEUTIC EXERCISES: CPT

## 2024-07-08 PROCEDURE — 97140 MANUAL THERAPY 1/> REGIONS: CPT

## 2024-07-08 NOTE — FLOWSHEET NOTE
of Education: [x] Verbal  [] Demo  [x] Written  Comprehension of Education:  [x] Verbalizes understanding.  [] Demonstrates understanding.  [] Needs review.  [] Demonstrates/verbalizes HEP/Ed previously given.     Exercises:GIVEN INITIALLY  Exercise Reps/ Time Weight/ Level Comments   Trapezius stretch 3 x 20\"       Reviewed codmans         Elbow flexion/extension AROM 15       Shrugs/rolls 15       PROM wand ER 10 x 5\"           Plan: [x] Continue current frequency toward long and short term goals.    [x] Specific Instructions for subsequent treatments: progress ROM, PROM, AAROM, AROM, strength within protocol restrictions      Time In: 1715           Time Out: 1800    Electronically signed by:  Daniel Aguirre PTA

## 2024-07-10 ENCOUNTER — HOSPITAL ENCOUNTER (OUTPATIENT)
Age: 49
Setting detail: THERAPIES SERIES
Discharge: HOME | End: 2024-07-10
Attending: ORTHOPAEDIC SURGERY
Payer: COMMERCIAL

## 2024-07-10 PROCEDURE — 97110 THERAPEUTIC EXERCISES: CPT

## 2024-07-10 PROCEDURE — 97140 MANUAL THERAPY 1/> REGIONS: CPT

## 2024-07-10 NOTE — FLOWSHEET NOTE
[facility] Rehabilitation &  Therapy  [address]  [place] [address]  [phone]  F: [phone]     Physical Therapy Daily Treatment Note    Date:  7/10/2024  Patient Name:  Liliana Ferrera    :  1975  MRN: [id number]  Physician: Efrain Nicole MD                                 Insurance: AETNA; PT HAS MET ;  DED/OOP $0 LIABILITY   Medical Diagnosis: (R) rotator cuff  M75.121                      Rehab Codes: (R) shoulder pain M25.511  Onset Date: referral 24; surgery 24                    Next 's appt.: 24    Visit Count: 10/18                                Cancel/No Show: 0/0     Subjective:   Pt reports Pain:  [x] Yes  [] No   Location: (R) shoulder pain; diffusely  Pain Rating: (0-10 scale) 1-2/10 on average  Pain altered Tx:  [] Yes  [x] No  Action:    Comments:  Pt reports low-level right shoulder soreness throughout the majority of the day.  Main sx production continues to be with stretching activities.     Objective:  24:     PROM supine  ER 45 degrees-pain guarded/tightness  Flexion 140 degrees   Extension 55 degrees   Good GH capsular mobility and scapular mobility.        Modalities: pt declined ice    Precautions: rotator cuff repair standard, biceps tenodesis 6 wk no resisted elbow 6wk ; 8 wk 24; 10 wk 24    Exercise Reps/ Time Weight/ Level Comments   Trapezius stretch 3 x 20\"       pulleys 4 min        Elbow flexion/extension AROM 20       Shrugs/rolls 20 ea       Right shoulder ISO 5\" x 10 ea  Flex, ext, IR/ER, ABD all submaximal effort   PROM wand ER 10 x 5\"       A/P, P/A, INF shoulder mobs 10\" x 10 ea Grade IV    Right shoulder PROM  15 min   extension, flexion, ER supine    Towel stretch across 10 x 5\"       PROM extension, flexion, ER supine     Response to treatment:  Pt tolerated session well, but continues to have discomfort with ER and ABD. Isometrics going well, pt continues to progress in

## 2024-07-15 ENCOUNTER — HOSPITAL ENCOUNTER (OUTPATIENT)
Age: 49
Setting detail: THERAPIES SERIES
Discharge: HOME | End: 2024-07-15
Attending: ORTHOPAEDIC SURGERY
Payer: COMMERCIAL

## 2024-07-15 PROCEDURE — 97140 MANUAL THERAPY 1/> REGIONS: CPT

## 2024-07-15 PROCEDURE — 97110 THERAPEUTIC EXERCISES: CPT

## 2024-07-15 NOTE — FLOWSHEET NOTE
well, but continues to have discomfort with ER and ABD. Added assisted ROM today with good tolerance.  Pt declined ice post session for discomfort-sx remained at baseline post treatment.     Treatment Charges: Mins Units   []  Modalities     [x]  Ther Exercise 30 2   [x]  Manual Therapy 15 1   []  Ther Activities     []  Neuro Re-ed     []  Vasocompression     [] Gait     []  Other     Total billable time 45 3       Assessment: [x] Progressing toward goals.  Some progress but guarded with PROM pulleys, ER, IR.  Has not added AAROM or AROM exercises as not yet 6 weeks post op.  Getting some anterior shoulder/biceps pain sharp pain intermittently which may be soft tissue healing.  Compliant with HEP and therapy.  Sees physician tomorrow.    [] No change.     [] Other:  [x] Patient would continue to benefit from skilled physical therapy services in order to: work on ROM, strength, function within post op restrictions.     STG: (to be met in 9 treatments) Addressed 7/8/24  ? Pain: 0-1/10 to help with general function progressing  ? ROM: PROM ER 75 degrees, IR 75 degrees, flexion 150 degrees; AAROM 150 degrees as appropriate per protocol progressing see objectives  ? Strength:hold until appropriate NOT MET per protocol restrictions  ? Function: hold until appropriate NOT MET per protocol restrictions  Independent with Home Exercise Programs MET  LTG: (to be met in 18 treatments)  ? Pain: 0/10 to help with general function  ? ROM: PROM ER 80 degrees, IR 80 degrees, flexion 165 degrees; AROM 150 degrees, IR reaching to 2\" deficit or better  ? Strength:4/5 or better  ? Function: able to actively reach to high level of cabinet, able to lift/carry up to 8-10 without pain unilaterally  Independent with Home Exercise Programs        Patient goals: get use of shoulder back        Functional Assessment Used: SPADI 102/130 78% limited  Current Status Score:  Goal Status Sore: 20/130 or better    Pt. Education:  [x] Yes  [] No  [x]

## 2024-07-17 ENCOUNTER — HOSPITAL ENCOUNTER (OUTPATIENT)
Age: 49
Setting detail: THERAPIES SERIES
Discharge: HOME | End: 2024-07-17
Attending: ORTHOPAEDIC SURGERY
Payer: COMMERCIAL

## 2024-07-17 PROCEDURE — 97140 MANUAL THERAPY 1/> REGIONS: CPT

## 2024-07-17 PROCEDURE — 97110 THERAPEUTIC EXERCISES: CPT

## 2024-07-17 NOTE — FLOWSHEET NOTE
[facility] Rehabilitation &  Therapy  [address]  [place] [address]  [phone]  F: [phone]     Physical Therapy Daily Treatment Note    Date:  2024  Patient Name:  Liliana Ferrera    :  1975  MRN: [id number]  Physician: Efrain Nicole MD                                 Insurance: AETNA; PT HAS MET ;  DED/OOP $0 LIABILITY   Medical Diagnosis: (R) rotator cuff  M75.121                      Rehab Codes: (R) shoulder pain M25.511  Onset Date: referral 24; surgery 24                    Next 's appt.: 24    Visit Count:                                 Cancel/No Show: 0/0     Subjective:   Pt reports Pain:  [x] Yes  [] No   Location: (R) shoulder pain; diffusely  Pain Rating: (0-10 scale) 1-2/10 on average  Pain altered Tx:  [] Yes  [x] No  Action:    Comments:  Pt still noting issues with significant issues with any type of active movement (currently about 80-90 degrees elevation).  States she has been testing herself with active motion intermittently but unable to do.    Objective:  24:   PROM supine  ER 45 degrees-pain guarded/tightness  Flexion 140 degrees   Extension 55 degrees   Good GH capsular mobility and scapular mobility.        Modalities: pt declined ice    Precautions: rotator cuff repair standard, biceps tenodesis 6 wk no resisted elbow 6wk ; 8 wk 24; 10 wk 24    Exercise Reps/ Time Weight/ Level Comments   Trapezius stretch 3 x 20\"       pulleys 4 min        Finger ladder  X 10 72#    Elbow flexion/extension AROM 20       Shrugs/rolls 20 ea       Right shoulder ISO 5\" x 10 ea  Flex, ext, IR/ER, ABD all submaximal effort   PROM wand ER 10 x 5\"       A/P, P/A, INF shoulder mobs 10\" x 10 ea Grade IV    AA shoulder flexion  2 x 10   Supine HELD TODAY   AA chest press  2 x 10     Right shoulder PROM  15 min   extension, flexion, ER supine    Towel stretch across back 10 x 5\"       PROM extension,

## 2024-07-22 ENCOUNTER — HOSPITAL ENCOUNTER (OUTPATIENT)
Age: 49
Setting detail: THERAPIES SERIES
Discharge: HOME | End: 2024-07-22
Attending: ORTHOPAEDIC SURGERY
Payer: COMMERCIAL

## 2024-07-22 PROCEDURE — 97140 MANUAL THERAPY 1/> REGIONS: CPT

## 2024-07-22 PROCEDURE — 97110 THERAPEUTIC EXERCISES: CPT

## 2024-07-22 NOTE — FLOWSHEET NOTE
[facility] Rehabilitation &  Therapy  [address]  [place] [address]  [phone]  F: [phone]     Physical Therapy Daily Treatment Note    Date:  2024  Patient Name:  Liliana Ferrera    :  1975  MRN: [id number]  Physician: Efrain Nicole MD                                 Insurance: AETNA; PT HAS MET ;  DED/OOP $0 LIABILITY   Medical Diagnosis: (R) rotator cuff  M75.121                      Rehab Codes: (R) shoulder pain M25.511  Onset Date: referral 24; surgery 24                    Next 's appt.: 24    Visit Count:                                 Cancel/No Show: 0/0     Subjective:   Pt reports Pain:  [x] Yes  [] No   Location: (R) shoulder pain; diffusely  Pain Rating: (0-10 scale) 1-2/10 on average  Pain altered Tx:  [] Yes  [x] No  Action:    Comments:  Pt reports difficulty with active movements using right UE, reports compliance with HEP.  Continue to perform work and daily activities within her current protocol restrictions.     Objective:  24:   PROM supine  ER 45 degrees-pain guarded/tightness  Flexion 140 degrees   Extension 55 degrees   Good GH capsular mobility and scapular mobility.        Modalities: pt declined ice    Precautions: rotator cuff repair standard, biceps tenodesis 6 wk no resisted elbow 6wk ; 8 wk 24; 10 wk 24    Exercise Reps/ Time Weight/ Level Comments   Trapezius stretch 3 x 20\"       pulleys 4 min        Finger ladder  X 10 79#    Elbow flexion/extension AROM 20       Shrugs/rolls 20 ea       Right shoulder ISO 5\" x 10 ea  Flex, ext, IR/ER, ABD all submaximal effort   PROM wand ER 10 x 5\"       A/P, P/A, INF shoulder mobs 10\" x 10 ea Grade IV    AA shoulder flexion  2 x 10   Clinician assisted   AA chest press  2 x 10     Right shoulder PROM  15 min   extension, flexion, ER supine    Towel stretch across back 10 x 5\"       PROM extension, flexion, ER supine     Response

## 2024-07-24 ENCOUNTER — HOSPITAL ENCOUNTER (OUTPATIENT)
Age: 49
Setting detail: THERAPIES SERIES
Discharge: HOME | End: 2024-07-24
Attending: ORTHOPAEDIC SURGERY
Payer: COMMERCIAL

## 2024-07-24 PROCEDURE — 97110 THERAPEUTIC EXERCISES: CPT

## 2024-07-24 PROCEDURE — 97140 MANUAL THERAPY 1/> REGIONS: CPT

## 2024-07-24 NOTE — FLOWSHEET NOTE
[facility] Rehabilitation &  Therapy  [address]  [place] [address]  [phone]  F: [phone]     Physical Therapy Daily Treatment Note    Date:  2024  Patient Name:  Liliana Ferrera    :  1975  MRN: [id number]  Physician: Efrain Nicole MD                                 Insurance: AETNA; PT HAS MET ;  DED/OOP $0 LIABILITY   Medical Diagnosis: (R) rotator cuff  M75.121                      Rehab Codes: (R) shoulder pain M25.511  Onset Date: referral 24; surgery 24                    Next 's appt.: 24    Visit Count:                                 Cancel/No Show: 0/0     Subjective:   Pt reports Pain:  [x] Yes  [] No   Location: (R) shoulder pain; diffusely  Pain Rating: (0-10 scale) 1-2/10 on average  Pain altered Tx:  [] Yes  [x] No  Action:    Comments:  Pt continues to demonstrate limited mobility, AROM to ~ 90 degrees with significant compensation.  Has been attempting to do more strenuous activities at home, ran chainsaw \"but just with the (L) arm.\"  We discussed avoidance of use especially at this point post op and additionally as she is unable to actively lift arm.    Objective:  24:   PROM supine  ER 45 degrees-pain guarded/tightness  Flexion 140 degrees   Extension 55 degrees   Good GH capsular mobility and scapular mobility.        Modalities: pt declined ice    Precautions: rotator cuff repair standard, biceps tenodesis 6 wk no resisted elbow 6wk ; 8 wk 24; 10 wk 24  NP- not performed  Exercise Reps/ Time Weight/ Level Comments   Trapezius stretch 3 x 20\"       pulleys 4 min        Finger ladder  X 10 79#    Elbow flexion/extension AROM 20       Shrugs/rolls 20 ea       Right shoulder ISO 5\" x 10 ea  Flex, ext, IR/ER, ABD all submaximal effort   PROM wand ER 10 x 5\"       A/P, P/A, INF shoulder mobs 10\" x 10 ea Grade IV    AA shoulder flexion  2 x 10   Clinician assisted   AA chest press  2 x

## 2024-07-31 ENCOUNTER — HOSPITAL ENCOUNTER (OUTPATIENT)
Age: 49
Setting detail: THERAPIES SERIES
Discharge: HOME | End: 2024-07-31
Attending: ORTHOPAEDIC SURGERY
Payer: COMMERCIAL

## 2024-07-31 PROCEDURE — 97110 THERAPEUTIC EXERCISES: CPT

## 2024-07-31 NOTE — FLOWSHEET NOTE
flexion  2 x 10   Clinician assisted   AA chest press  2 x 10  NP   Right shoulder PROM  15 min   extension, flexion, ER supine    Towel stretch across back 10 x 5\"       PROM extension, flexion, ER supine     Response to treatment:  Pt continues to have increased pain with both active and passive right shoulder flexion and ABD.  Strength remains limited.     Treatment Charges: Mins Units   []  Modalities     [x]  Ther Exercise 45 3   []  Manual Therapy     []  Ther Activities     []  Neuro Re-ed     []  Vasocompression     [] Gait     []  Other     Total billable time 45 3       Assessment: [x] Progressing toward goals.  Continues to demonstrate limited active flexion of the arm ~ 9 weeks post surgery 7/24/24    [] No change.     [] Other:  [x] Patient would continue to benefit from skilled physical therapy services in order to: work on ROM, strength, function within post op restrictions.     STG: (to be met in 9 treatments) Addressed 7/8/24  ? Pain: 0-1/10 to help with general function progressing  ? ROM: PROM ER 75 degrees, IR 75 degrees, flexion 150 degrees; AAROM 150 degrees as appropriate per protocol progressing see objectives  ? Strength:hold until appropriate NOT MET per protocol restrictions  ? Function: hold until appropriate NOT MET per protocol restrictions  Independent with Home Exercise Programs MET  LTG: (to be met in 18 treatments)  ? Pain: 0/10 to help with general function  ? ROM: PROM ER 80 degrees, IR 80 degrees, flexion 165 degrees; AROM 150 degrees, IR reaching to 2\" deficit or better  ? Strength:4/5 or better  ? Function: able to actively reach to high level of cabinet, able to lift/carry up to 8-10 without pain unilaterally  Independent with Home Exercise Programs        Patient goals: get use of shoulder back        Functional Assessment Used: SPADI 102/130 78% limited  Current Status Score:  Goal Status Sore: 20/130 or better    Pt. Education:  [x] Yes  [] No  [x] Reviewed Prior

## 2024-08-05 ENCOUNTER — HOSPITAL ENCOUNTER (OUTPATIENT)
Age: 49
Setting detail: THERAPIES SERIES
Discharge: HOME | End: 2024-08-05
Attending: ORTHOPAEDIC SURGERY
Payer: COMMERCIAL

## 2024-08-05 PROCEDURE — 97110 THERAPEUTIC EXERCISES: CPT

## 2024-08-05 NOTE — FLOWSHEET NOTE
[facility] &  Therapy  [address], [address]  [place] [facility] [address]  [phone]  F: [phone]     Physical Therapy Daily Treatment Note    Date:  2024  Patient Name:  Liliana Ferrera    :  1975  MRN: [id number]  Physician: Efrain Nicole MD                                 Insurance: AETNA; PT HAS MET ;  DED/OOP $0 LIABILITY   Medical Diagnosis: (R) rotator cuff  M75.121                      Rehab Codes: (R) shoulder pain M25.511  Onset Date: referral 24; surgery 24                    Next 's appt.: 24    Visit Count:                                 Cancel/No Show: 0/0     Subjective:   Pt reports Pain:  [x] Yes  [] No   Location: (R) shoulder pain; diffusely  Pain Rating: (0-10 scale) 1-2/10 on average, 7/10 with right shoulder ABD  Pain altered Tx:  [] Yes  [x] No  Action:    Comments:  Pt continues to have low-level right shoulder soreness but states she had some posterior shoulder discomfort [place]. Pain with right shoulder ABD continues, reports a \"sharp pulling\" posterior and lateral shoulder.      Objective:  Continues to have right shoulder pain and dysfunction with ABD both actively and passively, ROM limited to approx 30 degrees due to pain and dysfunction.  Sx presented early in therapy sessions and has continued since with minimal change.     24:   PROM supine  ER 45 degrees-pain guarded/tightness  Flexion 140 degrees   Extension 55 degrees   Good GH capsular mobility and scapular mobility.        Modalities: pt declined ice    Precautions: rotator cuff repair standard, biceps tenodesis 6 wk no resisted elbow 6wk ; 8 wk 24; 10 wk 24    NP- not performed  Exercise Reps/ Time Weight/ Level Comments   Trapezius stretch 3 x 20\"       pulleys 4 min        UBE  4 min   No resistance    Finger ladder  X 10 80#    Elbow flexion/extension AROM 20       Shrugs/rolls 20 ea

## 2024-08-07 ENCOUNTER — HOSPITAL ENCOUNTER (OUTPATIENT)
Age: 49
Setting detail: THERAPIES SERIES
Discharge: HOME | End: 2024-08-07
Attending: ORTHOPAEDIC SURGERY
Payer: COMMERCIAL

## 2024-08-07 PROCEDURE — 97110 THERAPEUTIC EXERCISES: CPT

## 2024-08-07 PROCEDURE — 97140 MANUAL THERAPY 1/> REGIONS: CPT

## 2024-08-08 ENCOUNTER — OFFICE VISIT (OUTPATIENT)
Age: 49
End: 2024-08-08

## 2024-08-08 VITALS — WEIGHT: 207 LBS | BODY MASS INDEX: 32.49 KG/M2 | HEIGHT: 67 IN

## 2024-08-08 DIAGNOSIS — M75.121 COMPLETE TEAR OF RIGHT ROTATOR CUFF, UNSPECIFIED WHETHER TRAUMATIC: Primary | ICD-10-CM

## 2024-08-08 RX ADMIN — METHYLPREDNISOLONE ACETATE 80 MG: 80 INJECTION, SUSPENSION INTRA-ARTICULAR; INTRALESIONAL; INTRAMUSCULAR; SOFT TISSUE at 08:02

## 2024-08-08 RX ADMIN — LIDOCAINE HYDROCHLORIDE 2 ML: 10 INJECTION, SOLUTION INFILTRATION; PERINEURAL at 08:01

## 2024-08-08 NOTE — PROGRESS NOTES
SURGERY Right 11/16/2022    RIGHT ANKLE HARDWARE REMOVAL. RIGHT PERONEUS BREVIS DEBRIDEMENT AND TENOLYSIS. RIGHT PERONEUS LONGUS TENOLYSIS performed by Tomas Kenney MD at [facility] OR    GASTRIC BYPASS SURGERY      GROWTH PLATE SURGERY      plate in head    HYSTERECTOMY (CERVIX STATUS UNKNOWN)      Partial; still has both ovaries    SHOULDER ARTHROSCOPY Right 5/17/2024    RIGHT SHOULDER ARTHROSCOPY ROTATOR CUFF REPAIR WITH ARTHREX SUBACROMIAL DECOMPRESSION, BICEPS TENODESIS, DISTAL CLAVICAL EXCISION, SUBSCAPULARIS REPAIR performed by Efrain Nicole MD at [facility] OR    SHOULDER ARTHROSCOPY Right 5/17/2024    RIGHT SHOULDER ARTHROSCOPY DISTAL CLAVICLE RESECTION performed by Efrain Nicole MD at [facility] OR     History reviewed. No pertinent family history.       Electronically signed by Efrain Nicole MD on 8/8/2024 at 12:19 PM     Please note that this chart was generated using voice recognition Dragon dictation software.  Although every effort was made to ensure the accuracy of this automated transcription, some errors in transcription may have occurred.

## 2024-08-09 RX ORDER — METHYLPREDNISOLONE ACETATE 80 MG/ML
80 INJECTION, SUSPENSION INTRA-ARTICULAR; INTRALESIONAL; INTRAMUSCULAR; SOFT TISSUE ONCE
Status: COMPLETED | OUTPATIENT
Start: 2024-08-09 | End: 2024-08-08

## 2024-08-09 RX ORDER — LIDOCAINE HYDROCHLORIDE 10 MG/ML
2 INJECTION, SOLUTION INFILTRATION; PERINEURAL ONCE
Status: COMPLETED | OUTPATIENT
Start: 2024-08-09 | End: 2024-08-08

## 2024-08-14 ENCOUNTER — HOSPITAL ENCOUNTER (OUTPATIENT)
Age: 49
Setting detail: THERAPIES SERIES
End: 2024-08-14
Attending: ORTHOPAEDIC SURGERY
Payer: COMMERCIAL

## 2024-08-14 ENCOUNTER — HOSPITAL ENCOUNTER (OUTPATIENT)
Age: 49
Setting detail: THERAPIES SERIES
Discharge: HOME | End: 2024-08-14
Attending: ORTHOPAEDIC SURGERY
Payer: COMMERCIAL

## 2024-08-14 PROCEDURE — 97110 THERAPEUTIC EXERCISES: CPT

## 2024-08-14 NOTE — FLOWSHEET NOTE
[facility] Rehabilitation &  Therapy  [address], [address]  [facility] [address]  [phone]  F: [phone]     Physical Therapy Daily Treatment Note    Date:  2024  Patient Name:  Liliana Ferrera    :  1975  MRN: [id number]  Physician: Efrain Nicole MD                                 Insurance: AETNA; PT HAS MET ;  DED/OOP $0 LIABILITY   Medical Diagnosis: (R) rotator cuff  M75.121                      Rehab Codes: (R) shoulder pain M25.511    Onset Date: referral 24; surgery 24                    Next 's appt.: 24    Visit Count:                                 Cancel/No Show: 0/0    Subjective:      Pain:  [x] Yes  [] No   Location: (R) shoulder pain; diffusely  Pain Rating: (0-10 scale) 1-2/10 on average resting, provokes mainly 5/10 briefly with shoulder ABD.  Pain altered Tx:  [] Yes  [x] No  Action:    Comments:  Pt reports a slight decrease in right shoulder discomfort since her steroid injection. Continues to stretch and perform strengthening activities as appropriate.      Objective:  8/15/24: Improved tolerance and motion with right shoulder ABD and ER, remains painful with some guarding, intensity of discomfort less than prior to injection.     24:   PROM seated  ER 45 degrees-pain guarded/tightness  PROM Flexion 140 degrees   IR 50 degrees   Good GH capsular mobility and scapular mobility.    AROM:   90 degrees flexion with moderate compensation  Abduction 60-70 degrees with moderate compensation, pain in lateral/anterior lateral humerus    AAROM:  125 degrees flexion,scaption    Strength:  Able to tolerate resisted biceps and triceps testing without significant pain  Shoulder elevation grossly less than 3/5 as unable to raise against gravity        Modalities:N/A    Precautions: rotator cuff repair standard, biceps tenodesis 6 wk no resisted elbow 6wk ; 8 wk 24; 10 wk 24    NP- not performed  Exercise

## 2024-08-19 ENCOUNTER — HOSPITAL ENCOUNTER (OUTPATIENT)
Age: 49
Setting detail: THERAPIES SERIES
Discharge: HOME | End: 2024-08-19
Attending: ORTHOPAEDIC SURGERY
Payer: COMMERCIAL

## 2024-08-19 PROCEDURE — 97110 THERAPEUTIC EXERCISES: CPT

## 2024-08-19 NOTE — FLOWSHEET NOTE
[facility] Rehabilitation &  Therapy  [address]  [place] [facility] [address]  [phone]  F: [phone]     Physical Therapy Daily Treatment Note    Date:  2024  Patient Name:  Liliana Ferrera    :  1975  MRN: [id number]  Physician: Efrain Nicole MD                                 Insurance: AETNA; PT HAS MET ;  DED/OOP $0 LIABILITY   Medical Diagnosis: (R) rotator cuff  M75.121                      Rehab Codes: (R) shoulder pain M25.511    Onset Date: referral 24; surgery 24                    Next 's appt.: 24    Visit Count:                                 Cancel/No Show: 0/0    Subjective:      Pain:  [x] Yes  [] No   Location: (R) shoulder pain; diffusely  Pain Rating: (0-10 scale) 1-2/10 on average resting, provokes mainly 5/10 briefly with shoulder ABD.  Pain altered Tx:  [] Yes  [x] No  Action:    Comments:  Pt reports that she has less \"catching\" pain with shoulder elevation and abduction, strength remains limited.  Pt states she continue to perform her home strengthening program but has not noticed much change in her right UE/shoulder strength.     Objective: 24:   PROM supine  ER 45 degrees-pain guarded/tightness  PROM Flexion 140 degrees   IR 50 degrees   Good GH capsular mobility and scapular mobility.    AROM:   90 degrees flexion with moderate compensation  Abduction 60-70 degrees with moderate compensation, pain in lateral/anterior lateral humerus    AAROM:  125 degrees flexion,scaption  Some pain and guarding remains with right shoulder ABD and ER    Modalities:N/A    Precautions: rotator cuff repair standard, biceps tenodesis 6 wk no resisted elbow 6wk ; 8 wk 24; 10 wk 24    NP- not performed  Exercise Reps/ Time Weight/ Level Comments   Trapezius stretch 3 x 20\"       pulleys 4 min        UBE  4 min   No resistance    Finger ladder  X 10 80#    Elbow flexion/extension AROM 20       Shrugs/rolls

## 2024-08-21 ENCOUNTER — HOSPITAL ENCOUNTER (OUTPATIENT)
Age: 49
Setting detail: THERAPIES SERIES
Discharge: HOME | End: 2024-08-21
Attending: ORTHOPAEDIC SURGERY
Payer: COMMERCIAL

## 2024-08-21 PROCEDURE — 97110 THERAPEUTIC EXERCISES: CPT

## 2024-08-21 NOTE — FLOWSHEET NOTE
[facility] Rehabilitation &  Therapy  [address]  [place] [facility] [address]  [phone]  F: [phone]     Physical Therapy Daily Treatment Note    Date:  2024  Patient Name:  Liliana Ferrera    :  1975  MRN: [id number]  Physician: Efrain Nicole MD                                 Insurance: AETNA; PT HAS MET ;  DED/OOP $0 LIABILITY   Medical Diagnosis: (R) rotator cuff  M75.121                      Rehab Codes: (R) shoulder pain M25.511    Onset Date: referral 24; surgery 24                    Next 's appt.: 24    Visit Count:                                 Cancel/No Show: 0/0    Subjective:      Pain:  [x] Yes  [] No   Location: (R) shoulder pain; diffusely  Pain Rating: (0-10 scale) 1-2/10 on average resting, provokes mainly 5/10 briefly with shoulder ABD.  Pain altered Tx:  [] Yes  [x] No  Action:    Comments:  No new c/o expressed by patient today.  States she continues to work on active right UE motion and strengthening, no significant changes in functional strength noted. Pain remains minimal with the exception of shoulder ABD.     Objective: 24:   PROM supine  ER 45 degrees-pain guarded/tightness  PROM Flexion 140 degrees   IR 50 degrees   Good GH capsular mobility and scapular mobility.    AROM:   90 degrees flexion with moderate compensation  Abduction 60-70 degrees with moderate compensation, pain in lateral/anterior lateral humerus    AAROM:  125 degrees flexion,scaption  Some pain and guarding remains with right shoulder ABD and ER    Modalities:N/A    Precautions: rotator cuff repair standard, biceps tenodesis 6 wk no resisted elbow 6wk ; 8 wk 24; 10 wk 24    NP- not performed  Exercise Reps/ Time Weight/ Level Comments   Trapezius stretch 3 x 20\"       pulleys 4 min        UBE  4 min   No resistance    Finger ladder  X 10 80#    Rail shine  X 15      Wall shine  2 x 10   Towel 45 degrees to

## 2024-08-26 ENCOUNTER — HOSPITAL ENCOUNTER (OUTPATIENT)
Age: 49
Setting detail: THERAPIES SERIES
Discharge: HOME | End: 2024-08-26
Attending: ORTHOPAEDIC SURGERY
Payer: COMMERCIAL

## 2024-08-26 PROCEDURE — 97110 THERAPEUTIC EXERCISES: CPT

## 2024-08-26 NOTE — FLOWSHEET NOTE
[facility] Rehabilitation &  Therapy  [address]  [place] [address]  [phone]  F: [phone]     Physical Therapy Daily Treatment Note    Date:  2024  Patient Name:  Liliana Ferrera    :  1975  MRN: [id number]  Physician: Efrain Nicole MD                                 Insurance: AETNA; PT HAS MET ;  DED/OOP $0 LIABILITY   Medical Diagnosis: (R) rotator cuff  M75.121                      Rehab Codes: (R) shoulder pain M25.511    Onset Date: referral 24; surgery 24                    Next 's appt.: 24    Visit Count:                                 Cancel/No Show: 0/0    Subjective:      Pain:  [x] Yes  [] No   Location: (R) shoulder pain; diffusely  Pain Rating: (0-10 scale) 1-2/10 on average resting, provokes mainly 5/10 briefly with shoulder ABD.  Pain altered Tx:  [] Yes  [x] No  Action:    Comments:  No new c/o expressed by patient today.  States she continues to work on active right UE motion and strengthening, no significant changes in functional strength noted.     Objective: 24:   PROM supine  ER 45 degrees-pain guarded/tightness  PROM Flexion 140 degrees   IR 50 degrees   Good GH capsular mobility and scapular mobility.    AROM:   90 degrees flexion with moderate compensation  Abduction 60-70 degrees with moderate compensation, pain in lateral/anterior lateral humerus    AAROM:  125 degrees flexion,scaption  Some pain and guarding remains with right shoulder ABD and ER    Modalities:N/A    Precautions: rotator cuff repair standard, biceps tenodesis 6 wk no resisted elbow 6wk ; 8 wk 24; 10 wk 24    NP- not performed  Exercise Reps/ Time Weight/ Level Comments   Trapezius stretch 3 x 20\"       pulleys 4 min        UBE  4 min   No resistance    Finger ladder  X 10 80#    Rail shine  X 15      Wall shine  2 x 10   Towel 45 degrees to horizontal    Elbow flexion/extension AROM 20

## 2024-08-28 ENCOUNTER — HOSPITAL ENCOUNTER (OUTPATIENT)
Age: 49
Setting detail: THERAPIES SERIES
Discharge: HOME | End: 2024-08-28
Attending: ORTHOPAEDIC SURGERY
Payer: COMMERCIAL

## 2024-08-28 PROCEDURE — 97110 THERAPEUTIC EXERCISES: CPT

## 2024-08-28 PROCEDURE — 97140 MANUAL THERAPY 1/> REGIONS: CPT

## 2024-08-28 NOTE — FLOWSHEET NOTE
[facility] Rehabilitation &  Therapy  [address]  [place] [address]  [phone]  F: [phone]     Physical Therapy Daily Treatment Note    Date:  2024  Patient Name:  Liliana Ferrera    :  1975  MRN: [id number]  Physician: Efrain Nicole MD                                 Insurance: AETNA; PT HAS MET ;  DED/OOP $0 LIABILITY   Medical Diagnosis: (R) rotator cuff  M75.121                      Rehab Codes: (R) shoulder pain M25.511    Onset Date: referral 24; surgery 24                    Next 's appt.: 24    Visit Count:                                 Cancel/No Show: 0/0    Subjective:      Pain:  [x] Yes  [] No   Location: (R) shoulder pain; diffusely  Pain Rating: (0-10 scale) 1-2/10 on average resting, provokes mainly 5/10 briefly with shoulder ABD.  Pain altered Tx:  [] Yes  [x] No  Action:    Comments:  Still very limited with any active motion of the shoulder.  Working on AAROM and ROM at home.    Objective: 24:   PROM supine  ER 45 degrees-pain guarded/tightness  PROM Flexion 140 degrees   IR 50 degrees   Good GH capsular mobility and scapular mobility.    AROM:   90 degrees flexion with moderate compensation  Abduction 60-70 degrees with moderate compensation, pain in lateral/anterior lateral humerus    AAROM:  125 degrees flexion,scaption  Some pain and guarding remains with right shoulder ABD and ER    Modalities:N/A    Precautions: rotator cuff repair standard, biceps tenodesis 6 wk no resisted elbow 6wk ; 8 wk 24; 10 wk 24    NP- not performed  Exercise Reps/ Time Weight/ Level Comments   Trapezius stretch 3 x 20\"       pulleys 4 min        UBE  4 min   No resistance    Finger ladder  X 10 80#    Rail shine  X 15      Wall shine  2 x 10   Towel 45 degrees to horizontal    Elbow flexion/extension AROM 20       Shrugs/rolls 20 ea       Right shoulder ISO 5\" x 10 ea  Flex, ext, IR/ER, ABD all

## 2024-09-04 ENCOUNTER — APPOINTMENT (OUTPATIENT)
Age: 49
End: 2024-09-04
Attending: ORTHOPAEDIC SURGERY
Payer: COMMERCIAL

## 2024-09-09 ENCOUNTER — HOSPITAL ENCOUNTER (OUTPATIENT)
Age: 49
Setting detail: THERAPIES SERIES
Discharge: HOME | End: 2024-09-09
Attending: ORTHOPAEDIC SURGERY
Payer: COMMERCIAL

## 2024-09-09 PROCEDURE — 97110 THERAPEUTIC EXERCISES: CPT

## 2024-09-09 PROCEDURE — 97140 MANUAL THERAPY 1/> REGIONS: CPT

## 2024-09-11 ENCOUNTER — HOSPITAL ENCOUNTER (OUTPATIENT)
Age: 49
Setting detail: THERAPIES SERIES
Discharge: HOME | End: 2024-09-11
Attending: ORTHOPAEDIC SURGERY
Payer: COMMERCIAL

## 2024-09-13 ENCOUNTER — HOSPITAL ENCOUNTER (OUTPATIENT)
Age: 49
Setting detail: THERAPIES SERIES
Discharge: HOME | End: 2024-09-13
Attending: ORTHOPAEDIC SURGERY
Payer: COMMERCIAL

## 2024-09-13 PROCEDURE — 97110 THERAPEUTIC EXERCISES: CPT

## 2024-09-13 PROCEDURE — 97140 MANUAL THERAPY 1/> REGIONS: CPT

## 2024-09-16 ENCOUNTER — HOSPITAL ENCOUNTER (OUTPATIENT)
Age: 49
Setting detail: THERAPIES SERIES
Discharge: HOME | End: 2024-09-16
Attending: ORTHOPAEDIC SURGERY
Payer: COMMERCIAL

## 2024-09-18 ENCOUNTER — HOSPITAL ENCOUNTER (OUTPATIENT)
Age: 49
Setting detail: THERAPIES SERIES
Discharge: HOME | End: 2024-09-18
Attending: ORTHOPAEDIC SURGERY
Payer: COMMERCIAL

## 2024-09-18 PROCEDURE — 97140 MANUAL THERAPY 1/> REGIONS: CPT

## 2024-09-18 PROCEDURE — 97110 THERAPEUTIC EXERCISES: CPT

## 2024-09-23 ENCOUNTER — HOSPITAL ENCOUNTER (OUTPATIENT)
Age: 49
Setting detail: THERAPIES SERIES
Discharge: HOME | End: 2024-09-23
Attending: ORTHOPAEDIC SURGERY
Payer: COMMERCIAL

## 2024-09-23 PROCEDURE — 97110 THERAPEUTIC EXERCISES: CPT

## 2024-09-23 PROCEDURE — 97140 MANUAL THERAPY 1/> REGIONS: CPT

## 2024-10-02 ENCOUNTER — HOSPITAL ENCOUNTER (OUTPATIENT)
Age: 49
Setting detail: THERAPIES SERIES
Discharge: HOME | End: 2024-10-02
Attending: ORTHOPAEDIC SURGERY
Payer: COMMERCIAL

## 2024-10-02 PROCEDURE — 97110 THERAPEUTIC EXERCISES: CPT

## 2024-10-02 PROCEDURE — 97140 MANUAL THERAPY 1/> REGIONS: CPT

## 2024-10-02 NOTE — FLOWSHEET NOTE
[facility] Rehabilitation &  Therapy  [address]  [facility] [facility] [address]  [phone]  F: [phone]     Physical Therapy Daily Treatment Note    Date:  10/2/2024  Patient Name:  Liliana Ferrera    :  1975  MRN: [id number]  Physician: Efrain Nicole MD                                 Insurance: AETNA; PT HAS MET ;  DED/OOP $0 LIABILITY   Medical Diagnosis: (R) rotator cuff  M75.121                      Rehab Codes: (R) shoulder pain M25.511    Onset Date: referral 24; surgery 24                    Next 's appt.: 24    Visit Count:     (34 of 60 visits used this year)                            Cancel/No Show: 2/0    Subjective:      Pain:  [x] Yes  [] No   Location: (R) shoulder pain; diffusely  Pain Rating: (0-10 scale) 1-2/10 on average resting, provokes mainly 5/10 briefly with shoulder ABD.  Pain altered Tx:  [] Yes  [x] No  Action:    Comments:  Pt continues to report pain and weakness with active right shoulder elevation.  Minimal discomfort or limitations with movements below shoulder level. Pt reports that she continues to work on active right UE elevation in standing and supine but states she has not noticed much improvement.     Objective: 24:   PROM supine  ER 45 degrees-pain guarded/tightness  PROM Flexion 140 degrees   IR 50 degrees   Good GH capsular mobility and scapular mobility.    AROM:   90 degrees flexion with moderate compensation  Abduction 60-70 degrees with moderate compensation, pain in lateral/anterior lateral humerus    AAROM:  125 degrees flexion,scaption  Some pain and guarding remains with right shoulder ABD and ER    Modalities: N/A    Precautions: rotator cuff repair standard, biceps tenodesis 6 wk no resisted elbow 6wk ; 8 wk 24; 10 wk 24    NP- not performed  Exercise Reps/ Time Weight/ Level Comments   Trapezius stretch 3 x 20\"       pulleys 4 min        UBE  4 min   No resistance

## 2024-10-07 ENCOUNTER — HOSPITAL ENCOUNTER (OUTPATIENT)
Age: 49
Setting detail: THERAPIES SERIES
Discharge: HOME | End: 2024-10-07
Attending: ORTHOPAEDIC SURGERY
Payer: COMMERCIAL

## 2024-10-07 PROCEDURE — 97110 THERAPEUTIC EXERCISES: CPT

## 2024-10-07 PROCEDURE — 97140 MANUAL THERAPY 1/> REGIONS: CPT

## 2024-10-07 NOTE — FLOWSHEET NOTE
min   No resistance    Finger ladder  X 10 80#    Rail shine  X 15      Wall shine  2 x 10   Paint roller   Elbow flexion/extension AROM 20       Shrugs/rolls 20 ea       Right shoulder ISO 5\" x 10 ea  Flex, ext, IR/ER, ABD all submaximal effort   Rows/extension 2 x 10 red    PROM wand ER 10 x 5\"       A/P, P/A, INF shoulder mobs 10\" x 10 ea Grade IV    AA shoulder flexion  2 x 10   Clinician assisted   chest press  2 x 10  Standing    Active shoulder flexion in supine.  X 10 AA  X 10 A  Pt to perform chest press then move acvtively into flexion   Right shoulder PROM  15 min   extension, flexion, ER supine    Towel stretch across back 10 x 5\"       PROM extension, flexion, ER supine; joint mob- inferior glide; anterior glide, posterior glide 10 min     Response to treatment:  Pt still limited significantly with active elevation, some improvement with paint roller assisted elevation.  Pt can hold right UE elevation statically after assistance to elevate to end range.     Treatment Charges: Mins Units   []  Modalities     [x]  Ther Exercise 40 2   [x]  Manual Therapy 10 1   []  Ther Activities     []  Neuro Re-ed     []  Vasocompression     [] Gait     []  Other     Total billable time 50 3       Assessment: [x] Progressing toward goals.  Pt with specific anterior; anterior/lateral humeral pain with passive ER, abduction, especially while in supine.  Limited with active elevation 11 1/2 weeks post surgery.  Seeing physician tomorrow and sending PN to physician.    [] No change.     [] Other:  [x] Patient would continue to benefit from skilled physical therapy services in order to: work on ROM, strength, function within post op restrictions.     STG: (to be met in 9 treatments) Assessed  8/7/24  ? Pain: 0-1/10 to help with general function progressing at rest, significant pain remains with use   ? ROM: PROM ER 75 degrees, IR 75 degrees, flexion 150 degrees; AAROM 150 degrees as appropriate per protocol progressing

## 2024-10-08 NOTE — FLOWSHEET NOTE
Plan: [] Continue current frequency toward long and short term goals.    [] Specific Instructions for subsequent treatments: progress ROM, PROM, AAROM, AROM, strength within protocol restrictions    Treatment Plan:  [x] Therapeutic Exercise   93219              [] Iontophoresis: 4 mg/mL Dexamethasone Sodium Phosphate  mAmin  04170   [] Therapeutic Activity  81959 [] Vasopneumatic cold with compression  89080                [] Gait Training       82486 [] Ultrasound          47719   [x] Neuromuscular Re-education  17219 [] Electrical Stimulation Unattended  02041   [x] Manual Therapy  04925 [] Electrical Stimulation Attended  16459   [x] Instruction in HEP           [] Lumbar/Cervical Traction  95375   [] Aquatic Therapy              82082 [] Cold/hotpack       [] Massage   64142      [] Dry Needling, 1 or 2 muscles  81499   [] Biofeedback, first 15 minutes   78432  [] Biofeedback, additional 15 minutes   40647 [] Dry Needling, 3 or more muscles  90933         []  Medication allergies reviewed for use of               Dexamethasone Sodium Phosphate 4mg/ml                with iontophoresis treatments.              Pt is not allergic.     Frequency:  2-3 x/week for additional 12 visits up to 42 total to continue to work on passive mobility deficits and progress strengthening, function, AROM.         Time In: 1715       Time Out: 1805    Electronically signed by:  Daniel Aguirre PTA     Goals reassessed, objective measures added in bold by nAgel Pascal PT    Physician Signature:________________________________Date:__________________  By signing above or cosigning this note, I have reviewed this plan of care and certify a need for medically necessary rehabilitation services.      *PLEASE SIGN ABOVE AND FAX BACK ALL PAGES*

## 2024-10-09 ENCOUNTER — HOSPITAL ENCOUNTER (OUTPATIENT)
Age: 49
Setting detail: THERAPIES SERIES
Discharge: HOME | End: 2024-10-09
Attending: ORTHOPAEDIC SURGERY
Payer: COMMERCIAL

## 2024-10-09 PROCEDURE — 97110 THERAPEUTIC EXERCISES: CPT

## 2024-10-09 PROCEDURE — 97140 MANUAL THERAPY 1/> REGIONS: CPT

## 2024-10-14 ENCOUNTER — HOSPITAL ENCOUNTER (OUTPATIENT)
Age: 49
Setting detail: THERAPIES SERIES
Discharge: HOME | End: 2024-10-14
Attending: ORTHOPAEDIC SURGERY
Payer: COMMERCIAL

## 2024-10-14 PROCEDURE — 97110 THERAPEUTIC EXERCISES: CPT

## 2024-10-14 PROCEDURE — 97140 MANUAL THERAPY 1/> REGIONS: CPT

## 2024-10-14 NOTE — FLOWSHEET NOTE
shines with towel and towel on the wall for shoulder flexion with assist of left UE. Pt demonstrated proper technique while performing within clinic setting.     Plan: [x] Continue current frequency toward long and short term goals.    [x] Specific Instructions for subsequent treatments: progress ROM, PROM, AAROM, AROM, strength within protocol restrictions    Frequency:  2-3 x/week for additional 12 visits up to 42 total to continue to work on passive mobility deficits and progress strengthening, function, AROM.         Time In: 1715       Time Out: 1805    Electronically signed by:  Daniel Aguirre PTA

## 2024-10-15 NOTE — FLOWSHEET NOTE
therapy at this time as she still has significant passive ROM limitations that will only improve with manual intervention and additionally requires safe guidance of progression of active and resisted program with formal PT.  Angel Pascal, PT    [] No change.     [] Other:  [x] Patient would continue to benefit from skilled physical therapy services in order to: work on ROM, strength, function within post op restrictions.     STG: (to be met in 9 treatments) Assessed  10/9/24  ? Pain: 0-1/10 to help with general function progressing at rest, significant pain remains with use   ? ROM: PROM ER 75 degrees, IR 75 degrees, flexion 150 degrees; AAROM 150 degrees as appropriate per protocol progressing slowly; limited with abduction and ER most significantly  ? Strength:hold until appropriate NOT MET   ? Function: hold until appropriate NOT MET   Independent with Home Exercise Programs MET    LTG: (to be met in 18 treatments)  ? Pain: 0/10 to help with general function progressing at rest, limited with use  ? ROM: PROM ER 80 degrees, IR 80 degrees, flexion 165 degrees; AROM 150 degrees, IR reaching to 2\" deficit or better PROGRESSING FOR PROM ELEVATION, LIMITED WITH ER/ABDUCTION  ? Strength:4/5 or better NOT MET  ? Function: able to actively reach to high level of cabinet, able to lift/carry up to 8-10 without pain unilaterally NOT MET  Independent with Home Exercise Programs      Patient goals: get use of shoulder back        Functional Assessment Used: SPADI 102/130 78% limited  Current Status Score:  Goal Status Sore: 20/130 or better    Pt. Education:  [x] Yes  [] No  [x] Reviewed Prior HEP/Ed  Method of Education: [x] Verbal  [] Demo  [x] Written  Comprehension of Education:  [x] Verbalizes understanding.  [] Demonstrates understanding.  [] Needs review.  [] Demonstrates/verbalizes HEP/Ed previously given.     Exercises:GIVEN INITIALLY  Exercise Reps/ Time Weight/ Level Comments   Trapezius stretch 3 x 20\"

## 2024-10-16 ENCOUNTER — HOSPITAL ENCOUNTER (OUTPATIENT)
Age: 49
Setting detail: THERAPIES SERIES
Discharge: HOME | End: 2024-10-16
Attending: ORTHOPAEDIC SURGERY
Payer: COMMERCIAL

## 2024-10-16 PROCEDURE — 97140 MANUAL THERAPY 1/> REGIONS: CPT

## 2024-10-16 PROCEDURE — 97110 THERAPEUTIC EXERCISES: CPT

## 2024-10-21 ENCOUNTER — HOSPITAL ENCOUNTER (OUTPATIENT)
Age: 49
Setting detail: THERAPIES SERIES
Discharge: HOME | End: 2024-10-21
Attending: ORTHOPAEDIC SURGERY
Payer: COMMERCIAL

## 2024-10-21 NOTE — FLOWSHEET NOTE
[facility] Rehabilitation &  Therapy  [address]  [place] [address]  [phone]  F: [phone]     Physical Therapy Cancel/No Show note    Date: 10/21/2024  Patient: Liliana Ferrera  : 1975  MRN: [id number]    Cancels/No Shows to date: 3/0    For today's appointment patient:    [x]  Cancelled    [] Rescheduled appointment    [] No-show     Reason given by patient:    []  Patient ill    [x]  Conflicting appointment    [] No transportation      [] Conflict with work    [] No reason given    [] Weather related    [] COVID-19    [] Other:      Comments:  Meeting at work      [x] Next appointment was confirmed    Electronically signed by: Daniel Aguirre PTA

## 2024-10-22 NOTE — FLOWSHEET NOTE
4+/5 Angel Rotar, PT    Modalities: N/A    Precautions: rotator cuff repair standard, biceps tenodesis per protocol   NP- not performed  Exercise Reps/ Time Weight/ Level Comments   Trapezius stretch 3 x 20\"       pulleys 4 min        UBE  4 min   No resistance    Finger ladder  X 10 80#    Rail shine  X 20  With yoga stick to increase angle    Wall shine  2 x 10   Paint roller   Elbow flexion/extension AROM 15  8#      Shrugs/rolls 20 ea       Rows/extension 2 x 10 Red 8#    Standing shoulder ADD 2 x 10  Red 8#    IR/ER 2 x 10  Green 5#  Yellow 3#    PROM wand ER 10 x 5\"       A/P, P/A, INF shoulder mobs 10\" x 10 ea Grade IV    AA shoulder flexion  2 x 10   Clinician assisted   chest press  2 x 10  Standing    Active shoulder flexion in supine.  X 10 AA  X 10 A  Pt to perform chest press then move acvtively into flexion   Right shoulder PROM  15 min   extension, flexion, ER supine    Towel stretch across back 10 x 5\"       PROM extension, flexion, ER supine; joint mob- inferior glide; anterior glide, posterior glide 10 min, ischemic trigger-point release right medial scapula/rhomboid 30\" x 4     Response to treatment:  Pt still limited significantly with active elevation with compensatory motions.  Tolerated strengthening below shoulder level well with minimal discomfort, added resisted shoulder ER/IR today with good tolerance and performance.     Treatment Charges: Mins Units   []  Modalities     [x]  Ther Exercise 40 2   [x]  Manual Therapy 10 1   []  Ther Activities     []  Neuro Re-ed     []  Vasocompression     [] Gait     []  Other     Total billable time 50 3       Assessment: [x] Progressing toward goals.  Patient with anterior and lateral humerus pain with PROM ER, abduction.  Slow progression but progressing with PROM.  Better strength and AROM at below shoulder and gravity eliminated position; but significant limitations remain ~ 5 month post surgery especially when working at any overhead AROM or

## 2024-10-23 ENCOUNTER — HOSPITAL ENCOUNTER (OUTPATIENT)
Age: 49
Setting detail: THERAPIES SERIES
Discharge: HOME | End: 2024-10-23
Attending: ORTHOPAEDIC SURGERY
Payer: COMMERCIAL

## 2024-10-23 PROCEDURE — 97140 MANUAL THERAPY 1/> REGIONS: CPT

## 2024-10-23 PROCEDURE — 97110 THERAPEUTIC EXERCISES: CPT

## 2024-10-26 NOTE — FLOWSHEET NOTE
given.     Exercises:GIVEN INITIALLY  Exercise Reps/ Time Weight/ Level Comments   Trapezius stretch 3 x 20\"       Reviewed codmans         Elbow flexion/extension AROM 15       Shrugs/rolls 15       PROM wand ER 10 x 5\"       8/21/24 add rail shines with towel and towel on the wall for shoulder flexion with assist of left UE. Pt demonstrated proper technique while performing within clinic setting.     Plan: [x] Continue current frequency toward long and short term goals.    [x] Specific Instructions for subsequent treatments: progress ROM, PROM, AAROM, AROM, strength within protocol restrictions    Frequency:  2-3 x/week for additional 12 visits up to 42 total to continue to work on passive mobility deficits and progress strengthening, function, AROM.         Time In: 1725       Time Out: 1815    Electronically signed by:  Daniel Aguirre PTA

## 2024-10-28 ENCOUNTER — HOSPITAL ENCOUNTER (OUTPATIENT)
Age: 49
Setting detail: THERAPIES SERIES
Discharge: HOME | End: 2024-10-28
Attending: ORTHOPAEDIC SURGERY
Payer: COMMERCIAL

## 2024-10-28 PROCEDURE — 97140 MANUAL THERAPY 1/> REGIONS: CPT

## 2024-10-28 PROCEDURE — 97110 THERAPEUTIC EXERCISES: CPT

## 2024-10-30 ENCOUNTER — HOSPITAL ENCOUNTER (OUTPATIENT)
Age: 49
Setting detail: THERAPIES SERIES
Discharge: HOME | End: 2024-10-30
Attending: ORTHOPAEDIC SURGERY
Payer: COMMERCIAL

## 2024-10-30 PROCEDURE — 97110 THERAPEUTIC EXERCISES: CPT

## 2024-10-30 PROCEDURE — 97140 MANUAL THERAPY 1/> REGIONS: CPT

## 2024-10-30 NOTE — FLOWSHEET NOTE
[facility] Rehabilitation &  Therapy  [address], [address]  [place] [address]  [phone]  F: [phone]     Physical Therapy Daily Treatment Note/ Progress NOTE    Date:  10/30/2024  Patient Name:  Liliana Ferrera    :  1975  MRN: [id number]  Physician: Efrain Nicole MD                                 Insurance: AETNA; PT HAS MET ;  DED/OOP $0 LIABILITY   Medical Diagnosis: (R) rotator cuff  M75.121                      Rehab Codes: (R) shoulder pain M25.511    Onset Date: referral 24; surgery 24                    Next 's appt.: 24    Visit Count: 34/42    (39 of 60 visits used this year)                            Cancel/No Show: 2/0  Reporting period 10/8/24 to 10/30/24    Subjective:  Denies pain today   Pain:  [x] Yes  [x] No   Location: (R) shoulder pain; diffusely  Pain Rating: (0-10 scale) 0/10 on average resting, provokes mainly 3/10 with active shoulder/UE elevation   Pain altered Tx:  [] Yes  [x] No  Action:  Comments:  Pt stated she had inc pain yesterday, sub scapular on R side, pain has diminished today but noted it was bothersome all day yesterday.    Patient reports to therapy today ~ 5 to 5 1/2 month post rotator cuff surgery.  Has made improvement with passive mobility as well as with active assistive motion, but continues to have significant limitations with active elevation of the shoulder at this time.  She has been compliant with therapy and with home program.  Have been able to work multiple directions with active assistive movement including sliding up railing, supine, and with paint roller and is able to get moderate elevation, but remains limited with full active motion as well as any lifting/carrying.    Objective:     10/30/24:   PROM supine  ER 50 degrees-pain guarded/tightness  PROM Flexion 160 degrees   IR 60 degrees     AROM:   90 degrees flexion with moderate compensation in standing, 125-130 degrees

## 2024-11-03 NOTE — FLOWSHEET NOTE
AAROM elevation including gravity eliminated or with use of paint roller or railing for assisted flexion, but remains unable to actively work against gravity in standing grossly.  Is making improvement with light lifting/carrying at side, but unable out in front of body.  Seeing physician this Thursday- will reach out regarding continued deficits at this time.  Angel Pascal, PT     [] No change.     [] Other:  [x] Patient would continue to benefit from skilled physical therapy services in order to: work on ROM, strength, function within post op restrictions.     STG: (to be met in 9 treatments) Assessed  10/30/24  ? Pain: 0-1/10 to help with general function progressing at rest, significant pain remains with use and end range of PROM  ? ROM: PROM ER 75 degrees, IR 75 degrees, flexion 150 degrees; AAROM 150 degrees as appropriate per protocol progressing slowly; limited with abduction and ER most significantly  ? Strength:hold until appropriate NOT MET   ? Function: hold until appropriate NOT MET   Independent with Home Exercise Programs MET    LTG: (to be met in 18 treatments)  ? Pain: 0/10 to help with general function progressing at rest, limited with use  ? ROM: PROM ER 80 degrees, IR 80 degrees, flexion 165 degrees; AROM 150 degrees, IR reaching to 2\" deficit or better PROGRESSING FOR PROM ELEVATION, LIMITED WITH ER/ABDUCTION  ? Strength:4/5 or better NOT MET  ? Function: able to actively reach to high level of cabinet, able to lift/carry up to 8-10 without pain unilaterally NOT MET  Independent with Home Exercise Programs      Patient goals: get use of shoulder back        Functional Assessment Used: SPADI 30/130 23% limited  Current Status Score:  Goal Status Sore: 20/130 or better    Pt. Education:  [x] Yes  [] No  [x] Reviewed Prior HEP/Ed  Method of Education: [x] Verbal  [] Demo  [x] Written  Comprehension of Education:  [x] Verbalizes understanding.  [] Demonstrates understanding.  [] Needs review.  []

## 2024-11-04 ENCOUNTER — HOSPITAL ENCOUNTER (OUTPATIENT)
Age: 49
Setting detail: THERAPIES SERIES
Discharge: HOME | End: 2024-11-04
Attending: ORTHOPAEDIC SURGERY
Payer: COMMERCIAL

## 2024-11-04 PROCEDURE — 97110 THERAPEUTIC EXERCISES: CPT

## 2024-11-05 NOTE — FLOWSHEET NOTE
[facility] Rehabilitation &  Therapy  [address], [address]  [place] [facility] [place] [address]  [phone]  F: [phone]     Physical Therapy Daily Treatment Note    Date:  2024  Patient Name:  Liliana Ferrera    :  1975  MRN: [id number]  Physician: Efrain Nicole MD                                 Insurance: AETNA; PT HAS MET ;  DED/OOP $0 LIABILITY   Medical Diagnosis: (R) rotator cuff  M75.121                      Rehab Codes: (R) shoulder pain M25.511    Onset Date: referral 24; surgery 24                    Next 's appt.: 24    Visit Count: 36/42    (43 of 60 visits used this year)                            Cancel/No Show: 2/0    Subjective:    Pain:  [x] Yes  [] No   Location: (R) shoulder pain; diffusely  Pain Rating: (0-10 scale) 0/10 on average resting, provokes mainly 3-4/10 with active shoulder/UE elevation.   Pain altered Tx:  [] Yes  [x] No  Action:    Comments:  Pt reports ongoing pain and weakness with right active shoulder elevation.  Pt is able to perform daily and work activities below shoulder level with minimal pain or limitations. Reports continued compliance with HEP.     Objective:      Patient  ~ 5 to 5 1/2 month post rotator cuff surgery.  She has made improvement with passive mobility as well as with active assistive motion, but continues to have significant limitations with active elevation of the shoulder at this time.  She has been compliant with therapy and with home program.  Have been able to work multiple directions with active assistive movement including sliding up railing, supine, and with paint roller and is able to get moderate elevation, but remains limited with full active motion as well as any lifting/carrying.    10/30/24:   PROM supine  ER 50 degrees-pain guarded/tightness  PROM Flexion 160 degrees   IR 60 degrees     AROM:   90 degrees flexion with moderate compensation in standing, 125-130 degrees

## 2024-11-06 ENCOUNTER — HOSPITAL ENCOUNTER (OUTPATIENT)
Age: 49
Setting detail: THERAPIES SERIES
Discharge: HOME | End: 2024-11-06
Attending: ORTHOPAEDIC SURGERY
Payer: COMMERCIAL

## 2024-11-06 PROCEDURE — 97140 MANUAL THERAPY 1/> REGIONS: CPT

## 2024-11-06 PROCEDURE — 97110 THERAPEUTIC EXERCISES: CPT

## 2024-11-07 ENCOUNTER — HOSPITAL ENCOUNTER (OUTPATIENT)
Age: 49
Discharge: HOME | End: 2024-11-07
Payer: COMMERCIAL

## 2024-11-07 ENCOUNTER — OFFICE VISIT (OUTPATIENT)
Age: 49
End: 2024-11-07
Payer: COMMERCIAL

## 2024-11-07 VITALS — WEIGHT: 207 LBS | BODY MASS INDEX: 32.49 KG/M2 | HEIGHT: 67 IN

## 2024-11-07 DIAGNOSIS — M75.121 COMPLETE TEAR OF RIGHT ROTATOR CUFF, UNSPECIFIED WHETHER TRAUMATIC: Primary | ICD-10-CM

## 2024-11-07 LAB
25(OH)D3 SERPL-MCNC: 11.5 NG/ML (ref 30–100)
ALBUMIN SERPL-MCNC: 4.1 G/DL (ref 3.5–5.2)
ALP SERPL-CCNC: 67 U/L (ref 35–104)
ALT SERPL-CCNC: 21 U/L (ref 10–35)
ANION GAP SERPL CALCULATED.3IONS-SCNC: 10 MMOL/L (ref 9–16)
AST SERPL-CCNC: 21 U/L (ref 10–35)
BASOPHILS # BLD: 0 K/UL (ref 0–0.2)
BASOPHILS NFR BLD: 1 % (ref 0–2)
BILIRUB SERPL-MCNC: 0.6 MG/DL (ref 0–1.2)
BUN SERPL-MCNC: 12 MG/DL (ref 6–20)
CALCIUM SERPL-MCNC: 9.5 MG/DL (ref 8.6–10.4)
CHLORIDE SERPL-SCNC: 104 MMOL/L (ref 98–107)
CHOLEST SERPL-MCNC: 115 MG/DL (ref 0–199)
CHOLESTEROL/HDL RATIO: 1.6
CO2 SERPL-SCNC: 26 MMOL/L (ref 20–31)
CREAT SERPL-MCNC: 0.6 MG/DL (ref 0.7–1.2)
EOSINOPHIL # BLD: 0.2 K/UL (ref 0–0.4)
EOSINOPHILS RELATIVE PERCENT: 4 % (ref 0–4)
ERYTHROCYTE [DISTWIDTH] IN BLOOD BY AUTOMATED COUNT: 12.9 % (ref 11.5–14.9)
FERRITIN SERPL-MCNC: 22 NG/ML (ref 15–150)
FOLATE SERPL-MCNC: 13 NG/ML (ref 4.8–24.2)
GFR, ESTIMATED: >90 ML/MIN/1.73M2
GLUCOSE SERPL-MCNC: 225 MG/DL (ref 74–99)
HCT VFR BLD AUTO: 36.2 % (ref 36–46)
HDLC SERPL-MCNC: 70 MG/DL
HGB BLD-MCNC: 12.3 G/DL (ref 12–16)
IRON SATN MFR SERPL: 25 % (ref 20–55)
IRON SERPL-MCNC: 90 UG/DL (ref 37–145)
LDLC SERPL CALC-MCNC: 39 MG/DL (ref 0–100)
LYMPHOCYTES NFR BLD: 1.4 K/UL (ref 1–4.8)
LYMPHOCYTES RELATIVE PERCENT: 32 % (ref 24–44)
MCH RBC QN AUTO: 28 PG (ref 26–34)
MCHC RBC AUTO-ENTMCNC: 34 G/DL (ref 31–37)
MCV RBC AUTO: 82.3 FL (ref 80–100)
MONOCYTES NFR BLD: 0.4 K/UL (ref 0.1–1.3)
MONOCYTES NFR BLD: 9 % (ref 1–7)
NEUTROPHILS NFR BLD: 54 % (ref 36–66)
NEUTS SEG NFR BLD: 2.5 K/UL (ref 1.3–9.1)
PLATELET # BLD AUTO: 213 K/UL (ref 150–450)
PMV BLD AUTO: 9.9 FL (ref 6–12)
POTASSIUM SERPL-SCNC: 4.3 MMOL/L (ref 3.7–5.3)
PROT SERPL-MCNC: 6.8 G/DL (ref 6.6–8.7)
RBC # BLD AUTO: 4.4 M/UL (ref 4–5.2)
SODIUM SERPL-SCNC: 140 MMOL/L (ref 136–145)
T4 FREE SERPL-MCNC: 1.2 NG/DL (ref 0.9–1.7)
TIBC SERPL-MCNC: 367 UG/DL (ref 250–450)
TRIGL SERPL-MCNC: 29 MG/DL (ref 0–149)
TSH SERPL DL<=0.05 MIU/L-ACNC: 2.05 UIU/ML (ref 0.27–4.2)
UNSATURATED IRON BINDING CAPACITY: 277 UG/DL (ref 112–347)
VIT B12 SERPL-MCNC: 328 PG/ML (ref 232–1245)
WBC OTHER # BLD: 4.5 K/UL (ref 3.5–11)

## 2024-11-07 PROCEDURE — 84443 ASSAY THYROID STIM HORMONE: CPT

## 2024-11-07 PROCEDURE — 82306 VITAMIN D 25 HYDROXY: CPT

## 2024-11-07 PROCEDURE — 80053 COMPREHEN METABOLIC PANEL: CPT

## 2024-11-07 PROCEDURE — 80061 LIPID PANEL: CPT

## 2024-11-07 PROCEDURE — 99213 OFFICE O/P EST LOW 20 MIN: CPT | Performed by: ORTHOPAEDIC SURGERY

## 2024-11-07 PROCEDURE — 83540 ASSAY OF IRON: CPT

## 2024-11-07 PROCEDURE — 82607 VITAMIN B-12: CPT

## 2024-11-07 PROCEDURE — 82746 ASSAY OF FOLIC ACID SERUM: CPT

## 2024-11-07 PROCEDURE — 82728 ASSAY OF FERRITIN: CPT

## 2024-11-07 PROCEDURE — 85025 COMPLETE CBC W/AUTO DIFF WBC: CPT

## 2024-11-07 PROCEDURE — 36415 COLL VENOUS BLD VENIPUNCTURE: CPT

## 2024-11-07 PROCEDURE — 83550 IRON BINDING TEST: CPT

## 2024-11-07 PROCEDURE — 84439 ASSAY OF FREE THYROXINE: CPT

## 2024-11-07 NOTE — PROGRESS NOTES
HYSTERECTOMY (CERVIX STATUS UNKNOWN)      Partial; still has both ovaries    SHOULDER ARTHROSCOPY Right 5/17/2024    RIGHT SHOULDER ARTHROSCOPY ROTATOR CUFF REPAIR WITH ARTHREX SUBACROMIAL DECOMPRESSION, BICEPS TENODESIS, DISTAL CLAVICAL EXCISION, SUBSCAPULARIS REPAIR performed by Efrain Nicole MD at [facility] OR    SHOULDER ARTHROSCOPY Right 5/17/2024    RIGHT SHOULDER ARTHROSCOPY DISTAL CLAVICLE RESECTION performed by Efrain Nicole MD at [facility] OR     History reviewed. No pertinent family history.       Electronically signed by Efrain Nicole MD on 11/7/2024 at 12:42 PM     Please note that this chart was generated using voice recognition Dragon dictation software.  Although every effort was made to ensure the accuracy of this automated transcription, some errors in transcription may have occurred.

## 2024-11-10 NOTE — FLOWSHEET NOTE
Assessment: [x] Progressing toward goals.  Improved overall passive mobility with limitation especially with ER, improved with AAROM elevation including gravity eliminated or with use of paint roller or railing for assisted flexion, but remains unable to actively work against gravity in standing grossly.  Is making improvement with light lifting/carrying at side, but unable out in front of body.       [] No change.     [] Other:  [x] Patient would continue to benefit from skilled physical therapy services in order to: work on ROM, strength, function within post op restrictions.     STG: (to be met in 9 treatments) Assessed  10/30/24  ? Pain: 0-1/10 to help with general function progressing at rest, significant pain remains with use and end range of PROM  ? ROM: PROM ER 75 degrees, IR 75 degrees, flexion 150 degrees; AAROM 150 degrees as appropriate per protocol progressing slowly; limited with abduction and ER most significantly  ? Strength:hold until appropriate NOT MET   ? Function: hold until appropriate NOT MET   Independent with Home Exercise Programs MET    LTG: (to be met in 18 treatments)  ? Pain: 0/10 to help with general function progressing at rest, limited with use  ? ROM: PROM ER 80 degrees, IR 80 degrees, flexion 165 degrees; AROM 150 degrees, IR reaching to 2\" deficit or better PROGRESSING FOR PROM ELEVATION, LIMITED WITH ER/ABDUCTION  ? Strength:4/5 or better NOT MET  ? Function: able to actively reach to high level of cabinet, able to lift/carry up to 8-10 without pain unilaterally NOT MET  Independent with Home Exercise Programs      Patient goals: get use of shoulder back        Functional Assessment Used: SPADI 30/130 23% limited  Current Status Score:  Goal Status Sore: 20/130 or better    Pt. Education:  [x] Yes  [] No  [x] Reviewed Prior HEP/Ed  Method of Education: [x] Verbal  [] Demo  [x] Written  Comprehension of Education:  [x] Verbalizes understanding.  [] Demonstrates

## 2024-11-11 ENCOUNTER — HOSPITAL ENCOUNTER (OUTPATIENT)
Age: 49
Setting detail: THERAPIES SERIES
Discharge: HOME | End: 2024-11-11
Attending: ORTHOPAEDIC SURGERY
Payer: COMMERCIAL

## 2024-11-11 PROCEDURE — 97110 THERAPEUTIC EXERCISES: CPT

## 2024-11-11 PROCEDURE — 97140 MANUAL THERAPY 1/> REGIONS: CPT

## 2024-11-13 ENCOUNTER — HOSPITAL ENCOUNTER (OUTPATIENT)
Age: 49
Setting detail: THERAPIES SERIES
Discharge: HOME | End: 2024-11-13
Attending: ORTHOPAEDIC SURGERY
Payer: COMMERCIAL

## 2024-11-13 PROCEDURE — 97140 MANUAL THERAPY 1/> REGIONS: CPT

## 2024-11-13 PROCEDURE — 97110 THERAPEUTIC EXERCISES: CPT

## 2024-11-17 NOTE — FLOWSHEET NOTE
Demonstrates/verbalizes HEP/Ed previously given.     Exercises:GIVEN INITIALLY  Exercise Reps/ Time Weight/ Level Comments   Trapezius stretch 3 x 20\"       Reviewed codmans         Elbow flexion/extension AROM 15       Shrugs/rolls 15       PROM wand ER 10 x 5\"       8/21/24 add rail shines with towel and towel on the wall for shoulder flexion with assist of left UE. Pt demonstrated proper technique while performing within clinic setting.     Plan: [x] Continue current frequency toward long and short term goals.  Hold PT until MRI is completed on 11/27/24  [x] Specific Instructions for subsequent treatments: progress ROM, PROM, AAROM, AROM, strength within protocol restrictions    Frequency:  2-3 x/week for additional 12 visits up to 42 total to continue to work on passive mobility deficits and progress strengthening, function, AROM.         Time In: 1715     Time Out: 1805    Electronically signed by:  Daniel Aguirre PTA

## 2024-11-18 ENCOUNTER — HOSPITAL ENCOUNTER (OUTPATIENT)
Age: 49
Setting detail: THERAPIES SERIES
Discharge: HOME | End: 2024-11-18
Attending: ORTHOPAEDIC SURGERY
Payer: COMMERCIAL

## 2024-11-18 PROCEDURE — 97140 MANUAL THERAPY 1/> REGIONS: CPT

## 2024-11-18 PROCEDURE — 97110 THERAPEUTIC EXERCISES: CPT

## 2024-11-20 ENCOUNTER — APPOINTMENT (OUTPATIENT)
Age: 49
End: 2024-11-20
Attending: ORTHOPAEDIC SURGERY
Payer: COMMERCIAL

## 2024-11-27 ENCOUNTER — HOSPITAL ENCOUNTER (OUTPATIENT)
Dept: MRI IMAGING | Age: 49
Discharge: HOME | End: 2024-11-29
Attending: ORTHOPAEDIC SURGERY
Payer: COMMERCIAL

## 2024-11-27 DIAGNOSIS — M75.121 COMPLETE TEAR OF RIGHT ROTATOR CUFF, UNSPECIFIED WHETHER TRAUMATIC: ICD-10-CM

## 2024-11-27 PROCEDURE — 73221 MRI JOINT UPR EXTREM W/O DYE: CPT

## 2024-12-02 ENCOUNTER — HOSPITAL ENCOUNTER (OUTPATIENT)
Age: 49
Setting detail: THERAPIES SERIES
Discharge: HOME | End: 2024-12-02
Attending: ORTHOPAEDIC SURGERY

## 2024-12-02 NOTE — FLOWSHEET NOTE
[facility] Rehabilitation &  Therapy  [address]  [place] [facility] [address]  [phone]  F: [phone]     Physical Therapy Cancel/No Show note    Date: 2024  Patient: Liliana Ferrera  : 1975  MRN: [id number]    Cancels/No Shows to date: 3/0    For today's appointment patient:    [x]  Cancelled    [] Rescheduled appointment    [] No-show     Reason given by patient:    []  Patient ill    []  Conflicting appointment    [] No transportation      [] Conflict with work    [] No reason given    [] Weather related    [] COVID-19    [x] Other:      Comments:  Spoke to surgeon in regard to MRI results.  Surgeon would like to hold PT until he reviews the results of the MRI with her.  Pt stated that she has reviewed the results of her MRI through myChart      [x] Next appointment was confirmed    Electronically signed by: Daniel Aguirre PTA

## 2024-12-03 ENCOUNTER — OFFICE VISIT (OUTPATIENT)
Age: 49
End: 2024-12-03
Payer: COMMERCIAL

## 2024-12-03 VITALS — HEIGHT: 67 IN | BODY MASS INDEX: 32.49 KG/M2 | WEIGHT: 207 LBS

## 2024-12-03 DIAGNOSIS — M25.611 SHOULDER STIFFNESS, RIGHT: ICD-10-CM

## 2024-12-03 DIAGNOSIS — M75.121 COMPLETE TEAR OF RIGHT ROTATOR CUFF, UNSPECIFIED WHETHER TRAUMATIC: Primary | ICD-10-CM

## 2024-12-03 PROCEDURE — 99214 OFFICE O/P EST MOD 30 MIN: CPT | Performed by: ORTHOPAEDIC SURGERY

## 2024-12-03 NOTE — PROGRESS NOTES
of Food in the Last Year: Patient declined     Ran Out of Food in the Last Year: Patient declined   Transportation Needs: Patient Declined (10/17/2024)    Received from [facility]    PRAPARE - Transportation     Lack of Transportation (Medical): Patient declined     Lack of Transportation (Non-Medical): Patient declined   Physical Activity: Insufficiently Active (10/17/2024)    Received from [facility]    Exercise Vital Sign     Days of Exercise per Week: 2 days     Minutes of Exercise per Session: 20 min   Stress: Patient Declined (10/17/2024)    Received from [facility]    [place] [employer] - Occupational Stress Questionnaire     Feeling of Stress : Patient declined   Social Connections: Unknown (10/17/2024)    Received from [facility]    Social Connection and Isolation Panel [NHANES]     Frequency of Communication with Friends and Family: Patient declined     Frequency of Social Gatherings with Friends and Family: Patient declined     Attends Mandaen Services: Patient declined     Active Member of Clubs or Organizations: Patient declined     Attends Club or Organization Meetings: Patient declined     Marital Status:    Intimate Partner Violence: Low Risk  (4/15/2021)    [facility]    Intimate Partner Violence     Insults You: Not on file     Threatens You: Not on file     Screams at You: Not on file     Physically Hurt: Not on file     Intimate Partner Violence Score: Not on file   Housing Stability: Patient Declined (10/17/2024)    Received from [facility]    Housing Stability Vital Sign     Unable to Pay for Housing in the Last Year: Patient declined     Number of Times Moved in the Last Year: Not on file     Homeless in the Last Year: Patient declined     Past Medical History:   Diagnosis Date    Arthritis     Right ankle with peroneal tendonitis    COVID-19 12/2021    and 9/2022. Mild both times, no breathing issues per

## 2024-12-04 ENCOUNTER — PREP FOR PROCEDURE (OUTPATIENT)
Age: 49
End: 2024-12-04

## 2024-12-04 ENCOUNTER — HOSPITAL ENCOUNTER (OUTPATIENT)
Age: 49
Setting detail: THERAPIES SERIES
Discharge: HOME | End: 2024-12-04
Attending: ORTHOPAEDIC SURGERY

## 2024-12-04 DIAGNOSIS — M25.611 SHOULDER STIFFNESS, RIGHT: ICD-10-CM

## 2024-12-04 PROBLEM — M75.101 ROTATOR CUFF TEAR, RIGHT: Status: ACTIVE | Noted: 2024-12-04

## 2024-12-04 NOTE — FLOWSHEET NOTE
[facility] Rehabilitation &  Therapy  [address]  [facility] [facility] [place] [place] [address]  [phone]  F: [phone]     Physical Therapy Cancel/No Show note    Date: 2024  Patient: Liliana Ferrera  : 1975  MRN: [id number]    Cancels/No Shows to date: 3/0    For today's appointment patient:    [x]  Cancelled    [] Rescheduled appointment    [] No-show     Reason given by patient:    []  Patient ill    []  Conflicting appointment    [] No transportation      [] Conflict with work    [] No reason given    [] Weather related    [] COVID-19    [x] Other:      Comments:  Pt to have surgery, will resume if additional PT orders are received.       [x] Next appointment was confirmed    Electronically signed by: Daniel Aguirre PTA

## 2024-12-06 ENCOUNTER — TELEPHONE (OUTPATIENT)
Age: 49
End: 2024-12-06

## 2024-12-06 NOTE — TELEPHONE ENCOUNTER
Pt called in regarding upcoming surg on her rt shoulder with Dr. Nicole on 01/31/25.     Pt is stating that employer is trying to plan for staffing while she will be out so she is asking if Dr. Nicole could give her a letter stating how long he thinks she may be off work after the surg and what type of light duty restrictions she may be on after she returns to work.      Pt is asking if the letter can be be uploaded to her [employer].      Please call pt with any questions @ [phone]

## 2024-12-13 ENCOUNTER — TELEPHONE (OUTPATIENT)
Dept: ADMINISTRATIVE | Age: 49
End: 2024-12-13

## 2024-12-13 NOTE — TELEPHONE ENCOUNTER
Patient called to get updated work note with current restrictions from 11/7/2024 until date of surgery that is scheduled 1/31/2025    Please contact patient when note has been updated     Thanks

## 2025-01-10 ENCOUNTER — TELEPHONE (OUTPATIENT)
Age: 50
End: 2025-01-10

## 2025-01-10 NOTE — TELEPHONE ENCOUNTER
Patient has been scheduled for sx on 1/31. Instructions were given at the time of booking.  I called today and left a message to confirm date/time/location of sx and reminded him/her to get PAT done (EKG only).  I also scheduled the post op appointment. He/She may call back with questions or to reschedule the appointment.

## 2025-01-16 ENCOUNTER — HOSPITAL ENCOUNTER (OUTPATIENT)
Age: 50
Discharge: HOME | End: 2025-01-18
Payer: COMMERCIAL

## 2025-01-16 LAB
EKG ATRIAL RATE: 60 BPM
EKG P AXIS: 60 DEGREES
EKG P-R INTERVAL: 176 MS
EKG Q-T INTERVAL: 406 MS
EKG QRS DURATION: 88 MS
EKG QTC CALCULATION (BAZETT): 406 MS
EKG R AXIS: 43 DEGREES
EKG T AXIS: 59 DEGREES
EKG VENTRICULAR RATE: 60 BPM

## 2025-01-16 PROCEDURE — 93010 ELECTROCARDIOGRAM REPORT: CPT | Performed by: INTERNAL MEDICINE

## 2025-01-16 PROCEDURE — 93005 ELECTROCARDIOGRAM TRACING: CPT | Performed by: NURSE PRACTITIONER

## 2025-05-07 DIAGNOSIS — M79.671 RIGHT FOOT PAIN: Primary | ICD-10-CM

## 2025-05-22 ENCOUNTER — TELEPHONE (OUTPATIENT)
Age: 50
End: 2025-05-22

## 2025-05-22 NOTE — TELEPHONE ENCOUNTER
Patient has been scheduled for sx on 6/13. Instructions were given at the time of booking but I'm sending a new set in the mail as she has rescheduled this many times.  I called today and left a message to confirm date/time/location of sx.  I also scheduled the post op appointment. He/She may call back with questions or to reschedule the appointment.

## 2025-06-03 ENCOUNTER — TRANSCRIBE ORDERS (OUTPATIENT)
Dept: ADMINISTRATIVE | Age: 50
End: 2025-06-03

## 2025-06-03 DIAGNOSIS — Z12.31 VISIT FOR SCREENING MAMMOGRAM: Primary | ICD-10-CM

## 2025-06-05 RX ORDER — SEMAGLUTIDE 1.34 MG/ML
INJECTION, SOLUTION SUBCUTANEOUS
COMMUNITY
Start: 2025-05-02

## 2025-06-05 RX ORDER — ROSUVASTATIN CALCIUM 5 MG/1
5 TABLET, COATED ORAL DAILY
COMMUNITY
Start: 2025-03-21

## 2025-06-05 NOTE — PROGRESS NOTES
Preoperative Instructions:    Stop eating  the 24 hours prior to surgery. You may only have clear liquids the 24 hours prior   to surgery.     Stop drinking clear liquids at midnight the night prior to your surgery.    Arrive at the surgery center (3rd entrance) on ___[phone]____________ by _0900_____________.     Please stop any blood thinning medications as directed by your surgeon or prescribing physician. Failure to stop certain medications may interfere with your scheduled surgery. These may include: Aspirin, Coumadin, Plavix, NSAIDS (Motrin, Aleve, Advil, Mobic, Celebrex), Eliquis, Pradaxa, Xarelto, Fish oil, and herbal supplements.  PLEASE STOP  OZEMPIC x week prior to surgery to try to decrease risk of aspiration.     You may continue the rest of your medications through the night before surgery unless instructed otherwise.     Day of surgery please take only the following medication(s) with a small sip of water:None      Please  shower with antibacterial soap and water the day before and  the day of surgery.      Reminders:  -If you are going home the day of your procedure, you will need a family member or friend to stay during the procedure and drive you home after your procedure. Your  must be 18 years of age or older and able to sign off on your discharge instructions.    -If you are going home the same day of your surgery, someone must remain with you for the first 24 hours after your surgery if you receive sedation or anesthesia.     -Please do not wear any jewelery , lotions, contacts or body piercing the day of surgery

## 2025-06-12 ENCOUNTER — ANESTHESIA EVENT (OUTPATIENT)
Dept: OPERATING ROOM | Age: 50
End: 2025-06-12
Payer: COMMERCIAL

## 2025-06-13 ENCOUNTER — HOSPITAL ENCOUNTER (OUTPATIENT)
Age: 50
Setting detail: OUTPATIENT SURGERY
Discharge: HOME | End: 2025-06-13
Attending: ORTHOPAEDIC SURGERY | Admitting: ORTHOPAEDIC SURGERY
Payer: COMMERCIAL

## 2025-06-13 ENCOUNTER — ANESTHESIA (OUTPATIENT)
Dept: OPERATING ROOM | Age: 50
End: 2025-06-13
Payer: COMMERCIAL

## 2025-06-13 VITALS
RESPIRATION RATE: 20 BRPM | BODY MASS INDEX: 32.49 KG/M2 | TEMPERATURE: 97.2 F | OXYGEN SATURATION: 93 % | SYSTOLIC BLOOD PRESSURE: 130 MMHG | DIASTOLIC BLOOD PRESSURE: 74 MMHG | HEART RATE: 55 BPM | HEIGHT: 65 IN | WEIGHT: 195 LBS

## 2025-06-13 DIAGNOSIS — M25.611 SHOULDER STIFFNESS, RIGHT: Primary | ICD-10-CM

## 2025-06-13 DIAGNOSIS — M75.101 TEAR OF RIGHT ROTATOR CUFF, UNSPECIFIED TEAR EXTENT, UNSPECIFIED WHETHER TRAUMATIC: ICD-10-CM

## 2025-06-13 LAB — GLUCOSE BLD-MCNC: 109 MG/DL (ref 65–105)

## 2025-06-13 PROCEDURE — 2720000010 HC SURG SUPPLY STERILE: Performed by: ORTHOPAEDIC SURGERY

## 2025-06-13 PROCEDURE — 6370000000 HC RX 637 (ALT 250 FOR IP): Performed by: ANESTHESIOLOGY

## 2025-06-13 PROCEDURE — 7100000010 HC PHASE II RECOVERY - FIRST 15 MIN: Performed by: ORTHOPAEDIC SURGERY

## 2025-06-13 PROCEDURE — 2580000003 HC RX 258: Performed by: ANESTHESIOLOGY

## 2025-06-13 PROCEDURE — 3600000004 HC SURGERY LEVEL 4 BASE: Performed by: ORTHOPAEDIC SURGERY

## 2025-06-13 PROCEDURE — 2500000003 HC RX 250 WO HCPCS: Performed by: NURSE ANESTHETIST, CERTIFIED REGISTERED

## 2025-06-13 PROCEDURE — 2500000003 HC RX 250 WO HCPCS: Performed by: ORTHOPAEDIC SURGERY

## 2025-06-13 PROCEDURE — 3700000001 HC ADD 15 MINUTES (ANESTHESIA): Performed by: ORTHOPAEDIC SURGERY

## 2025-06-13 PROCEDURE — 29825 SHO ARTHRS SRG LSS&RESCJ ADS: CPT | Performed by: ORTHOPAEDIC SURGERY

## 2025-06-13 PROCEDURE — 6360000002 HC RX W HCPCS: Performed by: ANESTHESIOLOGY

## 2025-06-13 PROCEDURE — 6360000002 HC RX W HCPCS: Performed by: NURSE ANESTHETIST, CERTIFIED REGISTERED

## 2025-06-13 PROCEDURE — 2709999900 HC NON-CHARGEABLE SUPPLY: Performed by: ORTHOPAEDIC SURGERY

## 2025-06-13 PROCEDURE — 3700000000 HC ANESTHESIA ATTENDED CARE: Performed by: ORTHOPAEDIC SURGERY

## 2025-06-13 PROCEDURE — 7100000001 HC PACU RECOVERY - ADDTL 15 MIN: Performed by: ORTHOPAEDIC SURGERY

## 2025-06-13 PROCEDURE — 6360000002 HC RX W HCPCS

## 2025-06-13 PROCEDURE — 29823 SHO ARTHRS SRG XTNSV DBRDMT: CPT | Performed by: ORTHOPAEDIC SURGERY

## 2025-06-13 PROCEDURE — 6360000002 HC RX W HCPCS: Performed by: NURSE PRACTITIONER

## 2025-06-13 PROCEDURE — 64415 NJX AA&/STRD BRCH PLXS IMG: CPT | Performed by: ANESTHESIOLOGY

## 2025-06-13 PROCEDURE — 7100000000 HC PACU RECOVERY - FIRST 15 MIN: Performed by: ORTHOPAEDIC SURGERY

## 2025-06-13 PROCEDURE — 6360000002 HC RX W HCPCS: Performed by: ORTHOPAEDIC SURGERY

## 2025-06-13 PROCEDURE — 82947 ASSAY GLUCOSE BLOOD QUANT: CPT

## 2025-06-13 PROCEDURE — 7100000011 HC PHASE II RECOVERY - ADDTL 15 MIN: Performed by: ORTHOPAEDIC SURGERY

## 2025-06-13 PROCEDURE — 6370000000 HC RX 637 (ALT 250 FOR IP): Performed by: NURSE PRACTITIONER

## 2025-06-13 PROCEDURE — 3600000014 HC SURGERY LEVEL 4 ADDTL 15MIN: Performed by: ORTHOPAEDIC SURGERY

## 2025-06-13 RX ORDER — SEVOFLURANE 250 ML/250ML
LIQUID RESPIRATORY (INHALATION)
Status: DISCONTINUED
Start: 2025-06-13 | End: 2025-06-13 | Stop reason: HOSPADM

## 2025-06-13 RX ORDER — MIDAZOLAM HYDROCHLORIDE 1 MG/ML
INJECTION, SOLUTION INTRAMUSCULAR; INTRAVENOUS
Status: COMPLETED
Start: 2025-06-13 | End: 2025-06-13

## 2025-06-13 RX ORDER — SODIUM CHLORIDE 0.9 % (FLUSH) 0.9 %
5-40 SYRINGE (ML) INJECTION PRN
Status: DISCONTINUED | OUTPATIENT
Start: 2025-06-13 | End: 2025-06-13 | Stop reason: HOSPADM

## 2025-06-13 RX ORDER — FENTANYL CITRATE 50 UG/ML
INJECTION, SOLUTION INTRAMUSCULAR; INTRAVENOUS
Status: DISCONTINUED | OUTPATIENT
Start: 2025-06-13 | End: 2025-06-13 | Stop reason: SDUPTHER

## 2025-06-13 RX ORDER — HYDRALAZINE HYDROCHLORIDE 20 MG/ML
10 INJECTION INTRAMUSCULAR; INTRAVENOUS
Status: DISCONTINUED | OUTPATIENT
Start: 2025-06-13 | End: 2025-06-13 | Stop reason: HOSPADM

## 2025-06-13 RX ORDER — ACETAMINOPHEN 500 MG
1000 TABLET ORAL ONCE
Status: COMPLETED | OUTPATIENT
Start: 2025-06-13 | End: 2025-06-13

## 2025-06-13 RX ORDER — DIPHENHYDRAMINE HYDROCHLORIDE 50 MG/ML
12.5 INJECTION, SOLUTION INTRAMUSCULAR; INTRAVENOUS
Status: DISCONTINUED | OUTPATIENT
Start: 2025-06-13 | End: 2025-06-13 | Stop reason: HOSPADM

## 2025-06-13 RX ORDER — SODIUM CHLORIDE 0.9 % (FLUSH) 0.9 %
5-40 SYRINGE (ML) INJECTION EVERY 12 HOURS SCHEDULED
Status: DISCONTINUED | OUTPATIENT
Start: 2025-06-13 | End: 2025-06-13 | Stop reason: HOSPADM

## 2025-06-13 RX ORDER — OXYCODONE AND ACETAMINOPHEN 5; 325 MG/1; MG/1
2 TABLET ORAL
Status: DISCONTINUED | OUTPATIENT
Start: 2025-06-13 | End: 2025-06-13 | Stop reason: HOSPADM

## 2025-06-13 RX ORDER — IPRATROPIUM BROMIDE AND ALBUTEROL SULFATE 2.5; .5 MG/3ML; MG/3ML
1 SOLUTION RESPIRATORY (INHALATION)
Status: DISCONTINUED | OUTPATIENT
Start: 2025-06-13 | End: 2025-06-13 | Stop reason: HOSPADM

## 2025-06-13 RX ORDER — DEXAMETHASONE SODIUM PHOSPHATE 4 MG/ML
INJECTION, SOLUTION INTRA-ARTICULAR; INTRALESIONAL; INTRAMUSCULAR; INTRAVENOUS; SOFT TISSUE
Status: DISCONTINUED | OUTPATIENT
Start: 2025-06-13 | End: 2025-06-13 | Stop reason: SDUPTHER

## 2025-06-13 RX ORDER — OXYCODONE AND ACETAMINOPHEN 5; 325 MG/1; MG/1
1 TABLET ORAL
Status: COMPLETED | OUTPATIENT
Start: 2025-06-13 | End: 2025-06-13

## 2025-06-13 RX ORDER — SODIUM CHLORIDE 9 MG/ML
INJECTION, SOLUTION INTRAVENOUS PRN
Status: DISCONTINUED | OUTPATIENT
Start: 2025-06-13 | End: 2025-06-13 | Stop reason: HOSPADM

## 2025-06-13 RX ORDER — MIDAZOLAM HYDROCHLORIDE 2 MG/2ML
2 INJECTION, SOLUTION INTRAMUSCULAR; INTRAVENOUS
Status: DISCONTINUED | OUTPATIENT
Start: 2025-06-13 | End: 2025-06-13 | Stop reason: HOSPADM

## 2025-06-13 RX ORDER — OXYCODONE AND ACETAMINOPHEN 5; 325 MG/1; MG/1
1-2 TABLET ORAL EVERY 6 HOURS PRN
Qty: 40 TABLET | Refills: 0 | Status: SHIPPED | OUTPATIENT
Start: 2025-06-13 | End: 2025-06-20

## 2025-06-13 RX ORDER — EPINEPHRINE 1 MG/ML
INJECTION, SOLUTION INTRAMUSCULAR; SUBCUTANEOUS
Status: DISCONTINUED
Start: 2025-06-13 | End: 2025-06-13 | Stop reason: HOSPADM

## 2025-06-13 RX ORDER — LIDOCAINE HYDROCHLORIDE 10 MG/ML
INJECTION, SOLUTION EPIDURAL; INFILTRATION; INTRACAUDAL; PERINEURAL
Status: DISCONTINUED | OUTPATIENT
Start: 2025-06-13 | End: 2025-06-13 | Stop reason: SDUPTHER

## 2025-06-13 RX ORDER — NALOXONE HYDROCHLORIDE 0.4 MG/ML
INJECTION, SOLUTION INTRAMUSCULAR; INTRAVENOUS; SUBCUTANEOUS PRN
Status: DISCONTINUED | OUTPATIENT
Start: 2025-06-13 | End: 2025-06-13 | Stop reason: HOSPADM

## 2025-06-13 RX ORDER — PROPOFOL 10 MG/ML
INJECTION, EMULSION INTRAVENOUS
Status: DISCONTINUED | OUTPATIENT
Start: 2025-06-13 | End: 2025-06-13 | Stop reason: SDUPTHER

## 2025-06-13 RX ORDER — MORPHINE SULFATE 2 MG/ML
2 INJECTION, SOLUTION INTRAMUSCULAR; INTRAVENOUS EVERY 5 MIN PRN
Status: DISCONTINUED | OUTPATIENT
Start: 2025-06-13 | End: 2025-06-13 | Stop reason: HOSPADM

## 2025-06-13 RX ORDER — SODIUM CHLORIDE, SODIUM LACTATE, POTASSIUM CHLORIDE, CALCIUM CHLORIDE 600; 310; 30; 20 MG/100ML; MG/100ML; MG/100ML; MG/100ML
INJECTION, SOLUTION INTRAVENOUS CONTINUOUS
Status: DISCONTINUED | OUTPATIENT
Start: 2025-06-13 | End: 2025-06-13 | Stop reason: HOSPADM

## 2025-06-13 RX ORDER — LABETALOL HYDROCHLORIDE 5 MG/ML
10 INJECTION, SOLUTION INTRAVENOUS
Status: DISCONTINUED | OUTPATIENT
Start: 2025-06-13 | End: 2025-06-13 | Stop reason: HOSPADM

## 2025-06-13 RX ORDER — FENTANYL CITRATE 50 UG/ML
INJECTION, SOLUTION INTRAMUSCULAR; INTRAVENOUS
Status: COMPLETED
Start: 2025-06-13 | End: 2025-06-13

## 2025-06-13 RX ORDER — ONDANSETRON 2 MG/ML
INJECTION INTRAMUSCULAR; INTRAVENOUS
Status: DISCONTINUED | OUTPATIENT
Start: 2025-06-13 | End: 2025-06-13 | Stop reason: SDUPTHER

## 2025-06-13 RX ORDER — MIDAZOLAM HYDROCHLORIDE 2 MG/2ML
2 INJECTION, SOLUTION INTRAMUSCULAR; INTRAVENOUS ONCE
Status: CANCELLED | OUTPATIENT
Start: 2025-06-13 | End: 2025-06-13

## 2025-06-13 RX ORDER — DEXAMETHASONE SODIUM PHOSPHATE 10 MG/ML
10 INJECTION, SOLUTION INTRAMUSCULAR; INTRAVENOUS ONCE
Status: DISCONTINUED | OUTPATIENT
Start: 2025-06-13 | End: 2025-06-13 | Stop reason: HOSPADM

## 2025-06-13 RX ORDER — ROCURONIUM BROMIDE 10 MG/ML
INJECTION, SOLUTION INTRAVENOUS
Status: DISCONTINUED | OUTPATIENT
Start: 2025-06-13 | End: 2025-06-13 | Stop reason: SDUPTHER

## 2025-06-13 RX ORDER — LIDOCAINE HYDROCHLORIDE 10 MG/ML
1 INJECTION, SOLUTION EPIDURAL; INFILTRATION; INTRACAUDAL; PERINEURAL
Status: DISCONTINUED | OUTPATIENT
Start: 2025-06-13 | End: 2025-06-13 | Stop reason: HOSPADM

## 2025-06-13 RX ORDER — DEXMEDETOMIDINE HYDROCHLORIDE 100 UG/ML
INJECTION, SOLUTION INTRAVENOUS
Status: DISCONTINUED | OUTPATIENT
Start: 2025-06-13 | End: 2025-06-13 | Stop reason: SDUPTHER

## 2025-06-13 RX ORDER — BUPIVACAINE HYDROCHLORIDE 5 MG/ML
INJECTION, SOLUTION EPIDURAL; INTRACAUDAL; PERINEURAL
Status: COMPLETED | OUTPATIENT
Start: 2025-06-13 | End: 2025-06-13

## 2025-06-13 RX ORDER — FENTANYL CITRATE 50 UG/ML
100 INJECTION, SOLUTION INTRAMUSCULAR; INTRAVENOUS ONCE
Refills: 0 | Status: CANCELLED | OUTPATIENT
Start: 2025-06-13 | End: 2025-06-13

## 2025-06-13 RX ORDER — ONDANSETRON 2 MG/ML
4 INJECTION INTRAMUSCULAR; INTRAVENOUS
Status: DISCONTINUED | OUTPATIENT
Start: 2025-06-13 | End: 2025-06-13 | Stop reason: HOSPADM

## 2025-06-13 RX ORDER — GLYCOPYRROLATE 0.2 MG/ML
0.4 INJECTION INTRAMUSCULAR; INTRAVENOUS ONCE
Status: DISCONTINUED | OUTPATIENT
Start: 2025-06-13 | End: 2025-06-13 | Stop reason: HOSPADM

## 2025-06-13 RX ORDER — METOCLOPRAMIDE HYDROCHLORIDE 5 MG/ML
10 INJECTION INTRAMUSCULAR; INTRAVENOUS
Status: DISCONTINUED | OUTPATIENT
Start: 2025-06-13 | End: 2025-06-13 | Stop reason: HOSPADM

## 2025-06-13 RX ORDER — MEPERIDINE HYDROCHLORIDE 50 MG/ML
12.5 INJECTION INTRAMUSCULAR; INTRAVENOUS; SUBCUTANEOUS EVERY 5 MIN PRN
Status: DISCONTINUED | OUTPATIENT
Start: 2025-06-13 | End: 2025-06-13 | Stop reason: HOSPADM

## 2025-06-13 RX ORDER — KETOROLAC TROMETHAMINE 30 MG/ML
INJECTION, SOLUTION INTRAMUSCULAR; INTRAVENOUS
Status: DISCONTINUED | OUTPATIENT
Start: 2025-06-13 | End: 2025-06-13 | Stop reason: SDUPTHER

## 2025-06-13 RX ORDER — IBUPROFEN 800 MG/1
800 TABLET, FILM COATED ORAL
Qty: 30 TABLET | Refills: 0 | Status: SHIPPED | OUTPATIENT
Start: 2025-06-13 | End: 2025-06-23

## 2025-06-13 RX ORDER — ACETAMINOPHEN 500 MG
TABLET ORAL
Status: DISCONTINUED
Start: 2025-06-13 | End: 2025-06-13 | Stop reason: HOSPADM

## 2025-06-13 RX ADMIN — BUPIVACAINE 10 ML: 13.3 INJECTION, SUSPENSION, LIPOSOMAL INFILTRATION at 10:08

## 2025-06-13 RX ADMIN — SODIUM CHLORIDE: 9 INJECTION, SOLUTION INTRAVENOUS at 09:57

## 2025-06-13 RX ADMIN — LIDOCAINE HYDROCHLORIDE 50 MG: 10 INJECTION, SOLUTION EPIDURAL; INFILTRATION; INTRACAUDAL; PERINEURAL at 10:15

## 2025-06-13 RX ADMIN — ROCURONIUM BROMIDE 20 MG: 10 INJECTION, SOLUTION INTRAVENOUS at 10:42

## 2025-06-13 RX ADMIN — SUGAMMADEX 200 MG: 100 INJECTION, SOLUTION INTRAVENOUS at 11:41

## 2025-06-13 RX ADMIN — HYDROMORPHONE HYDROCHLORIDE 0.5 MG: 1 INJECTION, SOLUTION INTRAMUSCULAR; INTRAVENOUS; SUBCUTANEOUS at 12:28

## 2025-06-13 RX ADMIN — ONDANSETRON 4 MG: 2 INJECTION, SOLUTION INTRAMUSCULAR; INTRAVENOUS at 11:03

## 2025-06-13 RX ADMIN — ACETAMINOPHEN 1000 MG: 500 TABLET ORAL at 09:41

## 2025-06-13 RX ADMIN — FENTANYL CITRATE 50 MCG: 50 INJECTION, SOLUTION INTRAMUSCULAR; INTRAVENOUS at 11:10

## 2025-06-13 RX ADMIN — PROPOFOL 150 MG: 10 INJECTION, EMULSION INTRAVENOUS at 10:15

## 2025-06-13 RX ADMIN — DEXAMETHASONE SODIUM PHOSPHATE 8 MG: 4 INJECTION, SOLUTION INTRAMUSCULAR; INTRAVENOUS at 10:25

## 2025-06-13 RX ADMIN — HYDROMORPHONE HYDROCHLORIDE 0.5 MG: 1 INJECTION, SOLUTION INTRAMUSCULAR; INTRAVENOUS; SUBCUTANEOUS at 12:08

## 2025-06-13 RX ADMIN — MIDAZOLAM HYDROCHLORIDE 2 MG: 1 INJECTION, SOLUTION INTRAMUSCULAR; INTRAVENOUS at 10:04

## 2025-06-13 RX ADMIN — KETOROLAC TROMETHAMINE 30 MG: 30 INJECTION, SOLUTION INTRAMUSCULAR at 11:26

## 2025-06-13 RX ADMIN — BUPIVACAINE HYDROCHLORIDE 10 ML: 5 INJECTION, SOLUTION EPIDURAL; INTRACAUDAL; PERINEURAL at 10:08

## 2025-06-13 RX ADMIN — ROCURONIUM BROMIDE 50 MG: 10 INJECTION, SOLUTION INTRAVENOUS at 10:15

## 2025-06-13 RX ADMIN — FENTANYL CITRATE 25 MCG: 50 INJECTION, SOLUTION INTRAMUSCULAR; INTRAVENOUS at 11:20

## 2025-06-13 RX ADMIN — Medication 2000 MG: at 10:27

## 2025-06-13 RX ADMIN — ROCURONIUM BROMIDE 10 MG: 10 INJECTION, SOLUTION INTRAVENOUS at 11:10

## 2025-06-13 RX ADMIN — FENTANYL CITRATE 25 MCG: 50 INJECTION, SOLUTION INTRAMUSCULAR; INTRAVENOUS at 11:25

## 2025-06-13 RX ADMIN — OXYCODONE HYDROCHLORIDE AND ACETAMINOPHEN 1 TABLET: 5; 325 TABLET ORAL at 12:53

## 2025-06-13 RX ADMIN — FENTANYL CITRATE 100 MCG: 50 INJECTION INTRAMUSCULAR; INTRAVENOUS at 10:04

## 2025-06-13 RX ADMIN — DEXMEDETOMIDINE HCL 8 MCG: 100 INJECTION INTRAVENOUS at 11:06

## 2025-06-13 ASSESSMENT — PAIN DESCRIPTION - ORIENTATION
ORIENTATION: RIGHT

## 2025-06-13 ASSESSMENT — PAIN SCALES - GENERAL
PAINLEVEL_OUTOF10: 8
PAINLEVEL_OUTOF10: 0
PAINLEVEL_OUTOF10: 5
PAINLEVEL_OUTOF10: 8
PAINLEVEL_OUTOF10: 2
PAINLEVEL_OUTOF10: 5

## 2025-06-13 ASSESSMENT — PAIN DESCRIPTION - LOCATION
LOCATION: SHOULDER

## 2025-06-13 ASSESSMENT — PAIN DESCRIPTION - DESCRIPTORS
DESCRIPTORS: BURNING
DESCRIPTORS: ACHING
DESCRIPTORS: ACHING
DESCRIPTORS: BURNING
DESCRIPTORS: ACHING
DESCRIPTORS: BURNING
DESCRIPTORS: BURNING

## 2025-06-13 ASSESSMENT — PAIN - FUNCTIONAL ASSESSMENT
PAIN_FUNCTIONAL_ASSESSMENT: NONE - DENIES PAIN
PAIN_FUNCTIONAL_ASSESSMENT: 0-10
PAIN_FUNCTIONAL_ASSESSMENT: PREVENTS OR INTERFERES WITH MANY ACTIVE NOT PASSIVE ACTIVITIES

## 2025-06-13 ASSESSMENT — PAIN DESCRIPTION - PAIN TYPE: TYPE: SURGICAL PAIN

## 2025-06-13 NOTE — ANESTHESIA PROCEDURE NOTES
Peripheral Block    Patient location during procedure: pre-op  Reason for block: post-op pain management and at surgeon's request  Start time: 6/13/2025 10:06 AM  End time: 6/13/2025 10:08 AM  Staffing  Performed: anesthesiologist   Anesthesiologist: Chalino Allison MD  Performed by: Chalino Allison MD  Authorized by: Chalino Allison MD    Preanesthetic Checklist  Completed: patient identified, IV checked, site marked, risks and benefits discussed, surgical/procedural consents, equipment checked, pre-op evaluation, timeout performed, anesthesia consent given, oxygen available, monitors applied/VS acknowledged, fire risk safety assessment completed and verbalized and blood product R/B/A discussed and consented  Peripheral Block   Patient position: sitting  Prep: ChloraPrep  Provider prep: mask and sterile gloves  Patient monitoring: cardiac monitor, continuous pulse ox, frequent blood pressure checks, IV access, oxygen and responsive to questions  Block type: Brachial plexus  Interscalene  Laterality: right  Injection technique: single-shot  Guidance: nerve stimulator and ultrasound guided    Needle   Needle type: insulated echogenic nerve stimulator needle   Needle gauge: 22 G  Needle localization: anatomical landmarks, nerve stimulator and ultrasound guidance  Needle length: 2 IN.  Assessment   Injection assessment: negative aspiration for heme, no paresthesia on injection, local visualized surrounding nerve on ultrasound and no intravascular symptoms  Paresthesia pain: none  Slow fractionated injection: yes  Hemodynamics: stable  Outcomes: uncomplicated and patient tolerated procedure well    Additional Notes  (+) RIGHT DELTOID TWITCH FROM 1.5MA DOWN TO 0.7MA  Medications Administered  BUPivacaine (MARCAINE) PF injection 0.5% - Perineural   10 mL - 6/13/2025 10:08:00 AM  BUPivacaine liposome (EXPAREL) injection 1.3% - Perineural   10 mL - 6/13/2025 10:08:00 AM

## 2025-06-13 NOTE — OP NOTE
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       Procedures: #1 right shoulder arthroscopic capsular release with manipulation under anesthesia #2 right shoulder arthroscopic extensive debridement of the labrum, humeral head chondroplasty, rotator cuff, subacromial bursectomy    Surgeon(s):  Efrain Nicole MD    Assistant:   Resident: Angeles Manjarrez DO    Anesthesia: General    Estimated Blood Loss (mL): Minimal    Complications: None    Specimens:   * No specimens in log *    Implants:  * No implants in log *      Drains: * No LDAs found *    Findings:  Infection Present At Time Of Surgery (PATOS) (choose all levels that have infection present):  No infection present  Other Findings:     Detailed Description of Procedure:   Informed consent was obtained.  I marked her right shoulder.  She received a preoperative regional nerve block.  She was brought back to the operating room general anesthesia was smoothly induced.  Was placed in the beachchair position.  The right arm was prepped and draped in normal sterile fashion.  A timeout was performed.  She did receive prophylactic antibiotics.  I did perform an examination under anesthesia.  I can externally rotate her to neutral with the elbow at her side.  I could forward elevate her to 100 degrees.  She I performed a gentle manipulation.  I could feel scar tissue releasing and then I could achieve 170 degrees of forward elevation.  I made a standard posterior viewing portal in the glenohumeral joint.  Under spinal needle localization and direct visualization I created an anterior working portal in the

## 2025-06-13 NOTE — H&P
ORTHOPEDIC PREOP HISTORY AND PHYSICAL      HPI / Chief Complaint  Liliana Ferrera is a 50 y.o. female who presents for right shoulder pain    Past Medical History  Liliana  has a past medical history of Arthritis, COVID-19, Depression, Diabetes (HCC), Hypertension, Shoulder pain, right, and Vertigo.    Past Surgical History  Liliana  has a past surgical history that includes Ankle fracture surgery (Right); Cholecystectomy; Growth plate surgery; Hysterectomy; Foot surgery (Right, 11/16/2022); Gastric bypass surgery; Shoulder arthroscopy (Right, 05/17/2024); Shoulder arthroscopy (Right, 05/17/2024); and fracture surgery.    Current Medications  Current Facility-Administered Medications   Medication Dose Route Frequency Provider Last Rate Last Admin    lidocaine PF 1 % injection 1 mL  1 mL IntraDERmal Once PRN Chalino Allison MD        lactated ringers infusion   IntraVENous Continuous Chalino Allison MD        sodium chloride flush 0.9 % injection 5-40 mL  5-40 mL IntraVENous 2 times per day Chalino Allison MD        sodium chloride flush 0.9 % injection 5-40 mL  5-40 mL IntraVENous PRN Chalino Allison MD        0.9 % sodium chloride infusion   IntraVENous PRN Chalino Allison  mL/hr at 06/13/25 0957 New Bag at 06/13/25 0957    ceFAZolin (ANCEF) 2000 mg in sterile water 20 mL IV syringe  2,000 mg IntraVENous On Call to OR Debbie Yee APRN - CNP        dexAMETHasone (PF) (DECADRON) injection 10 mg  10 mg IntraVENous Once Debbie Yee APRN - CNP        sodium chloride flush 0.9 % injection 5-40 mL  5-40 mL IntraVENous 2 times per day TrussDebbie APRN - CNP        sodium chloride flush 0.9 % injection 5-40 mL  5-40 mL IntraVENous PRN Debbie Yee APRN - CNP        sevoflurane inhalation liquid             EPINEPHrine 1 MG/ML injection             midazolam (VERSED) 2 MG/2ML injection             fentaNYL (SUBLIMAZE) 100 MCG/2ML injection             BUPivacaine liposome (EXPAREL) 1.3 % injection

## 2025-06-13 NOTE — DISCHARGE INSTRUCTIONS
Surgical bandage may be removed in 2 days down to the bare skin.  You may shower at that time.  Then place a dry bandage or bandaid over the incisions daily.  Wear the sling for comfort.( Wear continuously while nerve block is in effect) Place ice on the shoulder for 20-30 minutes throughout the day for pain and swelling control. Attend all PT appointments as scheduled and work on exercises at home.      Call your doctor now or seek immediate medical care if:     You have pain that does not get better after you take pain medicine.   You have a fever over 101°F.   You have chills   You have signs of infection, such as:   Increased pain, swelling, warmth, or redness.   Red streaks leading from the incision.   Pus draining from the incision.         Shoulder Block:    A nerve block can last 12-48 hours (blocks with exparel may last 48-72 hours in some patients).  If you continue to feel the effects of the nerve block longer than 72 hours, contact your surgeon.   Common side effects include: mild shortness of breath, a hoarse voice, blurry vision, unequal pupils, drooping of your eyelid on the operative side.   Again, these symptoms are common with shoulder blocks and you should not be alarmed if they occur. However, if you have severe symptoms of shortness of breath or difficulty breathing, please go to the nearest emergency room.   After a nerve block, you cannot feel pain, pressure, or extremes in temperature in the operative extremity, so it is at increased risk for injury.  It is a good idea to sleep in a recliner with pillows under your arm.  Wear your sling at all times until the block has completely worn off or until instructed otherwise by your surgeon.   If you have a tight dressing, check the color of your fingers every couple of hours. Call your surgeon if they look discolored.    Start taking your prescription medication before or as soon as the nerve block starts to wear off. Take pain medication at

## 2025-06-13 NOTE — ANESTHESIA PRE PROCEDURE
mL IntraVENous 2 times per day Debbie Yee APRN - CNP       • sodium chloride flush 0.9 % injection 5-40 mL  5-40 mL IntraVENous PRN Debbie Yee APRN - CNP       • sevoflurane inhalation liquid            • EPINEPHrine 1 MG/ML injection                Allergies:    Allergies   Allergen Reactions   • Morphine Nausea And Vomiting       Problem List:    Patient Active Problem List   Diagnosis Code   • Retained orthopedic hardware Z96.9   • Rotator cuff tear, right M75.101   • Shoulder stiffness, right M25.611       Past Medical History:        Diagnosis Date   • Arthritis     Right ankle with peroneal tendonitis   • COVID-19 12/2021    and 9/2022. Mild both times, no breathing issues per pt.   • Depression     On Celexa, pt. states stable at this time   • Diabetes (HCC)     resolved- History of, improved after weight loss surgery per pt., not taking meds at this time (11/2/22)   • Hypertension     resolved- Improved per pt. after weight loss surgery, no meds at this time (11/2/22)   • Shoulder pain, right    • Vertigo     - no episode for > 1 yr- positional  type       Past Surgical History:        Procedure Laterality Date   • ANKLE FRACTURE SURGERY Right     pins   • CHOLECYSTECTOMY     • FOOT SURGERY Right 11/16/2022    RIGHT ANKLE HARDWARE REMOVAL. RIGHT PERONEUS BREVIS DEBRIDEMENT AND TENOLYSIS. RIGHT PERONEUS LONGUS TENOLYSIS performed by Tomas Kenney MD at [facility]   • FRACTURE SURGERY     • GASTRIC BYPASS SURGERY     • GROWTH PLATE SURGERY      plate in head   • HYSTERECTOMY (CERVIX STATUS UNKNOWN)      Partial; still has both ovaries   • SHOULDER ARTHROSCOPY Right 05/17/2024    RIGHT SHOULDER ARTHROSCOPY ROTATOR CUFF REPAIR WITH ARTHREX SUBACROMIAL DECOMPRESSION, BICEPS TENODESIS, DISTAL CLAVICAL EXCISION, SUBSCAPULARIS REPAIR performed by Efrain Nicole MD at [facility]   • SHOULDER ARTHROSCOPY Right 05/17/2024    RIGHT SHOULDER ARTHROSCOPY DISTAL CLAVICLE RESECTION performed by Corey

## 2025-06-13 NOTE — ANESTHESIA POSTPROCEDURE EVALUATION
Department of Anesthesiology  Postprocedure Note    Patient: Liliana Ferrera  MRN: [id number]  YOB: 1975  Date of evaluation: 6/13/2025    Procedure Summary       Date: 06/13/25 Room / Location: [address]    Anesthesia Start: 1012 Anesthesia Stop: 1151    Procedure: RIGHT SHOULDER ARTHROSCOPY REVISION ROTATOR CUFF REPAIR  HARDWARE REMOVAL AND CAPSULAR RELEASE WITH ARTHREX AND HERNANDEZ AND NEPHEW (Right) Diagnosis:       Rotator cuff tear, right      Shoulder stiffness, right      (Rotator cuff tear, right [M75.101])      (Shoulder stiffness, right [M25.611])    Surgeons: Efrain Nicole MD Responsible Provider: Chalino Alilson MD    Anesthesia Type: general, regional ASA Status: 1            Anesthesia Type: No value filed.    Brisa Phase I: Brisa Score: 10    Brisa Phase II:      Anesthesia Post Evaluation    Patient location during evaluation: PACU  Patient participation: complete - patient participated  Level of consciousness: awake and alert  Airway patency: patent  Nausea & Vomiting: no nausea and no vomiting  Cardiovascular status: hemodynamically stable  Respiratory status: room air and spontaneous ventilation  Hydration status: euvolemic  Multimodal analgesia pain management approach  Pain management: adequate    No notable events documented.

## 2025-06-18 ENCOUNTER — HOSPITAL ENCOUNTER (OUTPATIENT)
Age: 50
Setting detail: THERAPIES SERIES
Discharge: HOME | End: 2025-06-18
Attending: ORTHOPAEDIC SURGERY
Payer: COMMERCIAL

## 2025-06-18 PROCEDURE — 97162 PT EVAL MOD COMPLEX 30 MIN: CPT

## 2025-06-18 PROCEDURE — 97110 THERAPEUTIC EXERCISES: CPT

## 2025-06-18 NOTE — CONSULTS
[facility] Rehabilitation &  Therapy  [address]  [place] [address]  [phone]  F: [phone]     Physical Therapy Upper Extremity Evaluation    Date:  2025  Patient: Liliana Ferrera  : 1975  MRN: [id number]  Physician: Efrain Nicole MD     Insurance: AE;  DED MET; 3000$ OOP;$1115.13 LEFT/10% ; 60/60 VISITS   Medical Diagnosis: shoulder stiffness (R) M25.611  Rehab Codes: shoulder stiffness M25.611; shoulder pain M25.511  Onset Date: 25   Next 's appt: 25  Visit Count:    Cancel/No Show: 0/0    Subjective:   Patient presents to therapy with complaints of (R) sided shoulder stiffness at this time, limitations with ROM post manipulation and surgical removal of scar tissue that occurred on 25.  Prior to surgery, had rotator cuff repair with complications- surgical intervention included supraspinatus and subscap repair as well as biceps tenodesis on 24.  Had significant ROM and strength limitations post surgically- MRI was done and concern over hardware issues with anchor as well as issues with scar tissue found.  Was going to do surgical intervention in January, but started new job.  New surgery for removal of scar tissue on 25 as previously noted.  Currently notes limitations with reaching ADLs- was limited above 90 degrees actively today, as well as functional reaching to wash hair, reach behind back.    CC:complaints of general shoulder stiffness, particularly with posterior lateral shoulder joint pain/discomfort with stretching  HPI: surgical intervention 25    PMHx: [] Unremarkable [x] Diabetes [x] HTN  [] Pacemaker   [] MI/Heart Problems [] Cancer [x] Arthritis [] Other:              [x] Refer to full medical chart  In [facility]     Medical History    Diagnosis Date Comment Source   Arthritis  Right ankle with peroneal tendonitis    Diabetes (HCC)  resolved- History of, improved after weight loss

## 2025-06-20 ENCOUNTER — HOSPITAL ENCOUNTER (OUTPATIENT)
Age: 50
Setting detail: THERAPIES SERIES
Discharge: HOME | End: 2025-06-20
Attending: ORTHOPAEDIC SURGERY
Payer: COMMERCIAL

## 2025-06-20 PROCEDURE — 97110 THERAPEUTIC EXERCISES: CPT

## 2025-06-20 PROCEDURE — 97140 MANUAL THERAPY 1/> REGIONS: CPT

## 2025-06-20 NOTE — CONSULTS
[facility] Rehabilitation &  Therapy  [address]  [place] [address]  [phone]  F: [phone]     Physical Therapy Daily Treatment Note    Date:  2025  Patient Name:  Liliana Ferrera    :  1975  MRN: [id number]  Physician: Efrain Nicole MD                                 Insurance: AE;  DED MET; 3000$ OOP;$1115.13 LEFT/10% ; 60/60 VISITS   Medical Diagnosis: shoulder stiffness (R) M25.611                        Rehab Codes: shoulder stiffness M25.611; shoulder pain M25.511  Onset Date: 25               Next 's appt: 25  Visit Count: 2                                Cancel/No Show: 0/0    Subjective:    Pain:  [x] Yes  [] No   Location: (R) shoulder grossly, particularly in posterior lateral joint  Pain Rating: (0-10 scale) 1-2/10  Pain altered Tx:  [] Yes  [x] No  Action:  Comments:     Returns- states sore but has been using shoulder.  Reports compliance with HEP at this time.    Objective:         Precautions:          Treatment provided:       Exercise Reps/ Time Weight/ Level Comments   pulleys 2' each  Flexion, abduction   UBE 1.5/1.5'           Wand elevation 10 x 5\" 1#    Wand ER 10 x 5\" 1#    Wand extension 10 x 5\" 1#          Wall walk 10x                                                                       MANUAL   Grade 2-3 anterior, posterior, inferior glide, in conjunction with PROM     Response to treatment: More stiff and limited today PROM versus last treatment session grossly.  Was more limited with tolerance of IR stretching- about 5 degrees more limited from initial session insidiously.  Worked on extensive PROM and manual therapy including both joint mobs and PROM.      Treatment Charges: Mins Units   []  Modalities     [x]  Ther Exercise 20 1   [x]  Manual Therapy 25 2   []  Ther Activities     []  Neuro Re-ed     []  Vasocompression     [] Gait     []  Other     Total billable time 45 3

## 2025-06-23 ENCOUNTER — HOSPITAL ENCOUNTER (OUTPATIENT)
Age: 50
Setting detail: THERAPIES SERIES
Discharge: HOME | End: 2025-06-23
Attending: ORTHOPAEDIC SURGERY
Payer: COMMERCIAL

## 2025-06-23 PROCEDURE — 97110 THERAPEUTIC EXERCISES: CPT

## 2025-06-23 PROCEDURE — 97140 MANUAL THERAPY 1/> REGIONS: CPT

## 2025-06-23 NOTE — FLOWSHEET NOTE
[facility] Rehabilitation &  Therapy  [address]  [place] [address]  [phone]  F: [phone]     Physical Therapy Daily Treatment Note    Date:  2025  Patient Name:  Liliana Ferrera    :  1975  MRN: [id number]  Physician: Efrain Nicole MD                                 Insurance: AE;  DED MET; 3000$ OOP;$1115.13 LEFT/10% ; 60/60 VISITS   Medical Diagnosis: shoulder stiffness (R) M25.611                        Rehab Codes: shoulder stiffness M25.611; shoulder pain M25.511  Onset Date: 25               Next 's appt: 25    Visit Count: 3/12                                Cancel/No Show: 0/0    Subjective:    Pain:  [x] Yes  [] No   Location: (R) shoulder grossly, particularly in posterior lateral joint  Pain Rating: (0-10 scale) 1-2/10  Pain altered Tx:  [] Yes  [x] No  Action:    Comments: Pt reports increased right shoulder soreness and stiffness upon arising in the morning. Trying sudha stretch and use her right UE as much as possible-limited by tightness and weakness -vs- pain.     Objective:  Flexion 140 degrees with pulleys         Precautions:          Treatment provided:     Exercise Reps/ Time Weight/ Level Comments   pulleys 2' each  Flexion, abduction   UBE 1.5/1.5'           Wand elevation 10 x 5\" 1#    Wand ER 10 x 5\" 1#    Wand extension 10 x 5\" 1#          Wall walk 10x  72\"                                                                     MANUAL 20  Grade 2-3 anterior, posterior, inferior glide, in conjunction with PROM     Response to treatment: Pt tolerated session well but remains limited with active right UE elevation due to tightness and weakness. Pain minimal with active or passive stretching.     Treatment Charges: Mins Units   []  Modalities     [x]  Ther Exercise 25 1   [x]  Manual Therapy 20 2   []  Ther Activities     []  Neuro Re-ed     []  Vasocompression     [] Gait     []  Other     Total billable

## 2025-06-24 NOTE — FLOWSHEET NOTE
[facility] Rehabilitation &  Therapy  [address]  [place] [address]  [phone]  F: [phone]     Physical Therapy Daily Treatment Note    Date:  2025    Patient Name:  Liliana Ferrera    :  1975  MRN: [id number]  Physician: Efrain Nicole MD                                 Insurance: AE;  DED MET; 3000$ OOP;$1115.13 LEFT/10% ; 60/60 VISITS   Medical Diagnosis: shoulder stiffness (R) M25.611                        Rehab Codes: shoulder stiffness M25.611; shoulder pain M25.511  Onset Date: 25               Next 's appt: 25    Visit Count:                                 Cancel/No Show: 0/0    Subjective:    Pain:  [x] Yes  [] No   Location: (R) shoulder grossly, particularly in posterior lateral joint  Pain Rating: (0-10 scale) 1-2/10  Pain altered Tx:  [] Yes  [x] No  Action:    Comments: Pt reports continued right shoulder stiffness upon arising in the morning, denies resting shoulder pain, some right medial scapular discomfort. Pt states that she will return to work next Monday.  Follow-up with ortho tomorrow,     Objective:  Flexion 140 degrees with pulleys.           Precautions:          Treatment provided:     Exercise Reps/ Time Weight/ Level Comments   pulleys 2' each  Flexion, abduction   UBE 1.5/1.5'           Wand elevation 10 x 5\" 1#    Wand ER 10 x 5\" 1#    Wand extension 10 x 5\" 1#          Wall walk 10x  77\"         3- level rows X 20 5# green    Shoulder extension  X 20 5# green                 Supine shoulder flexion  X 15   1# bar                                  MANUAL 20  Grade 2-3 anterior, posterior, inferior glide, in conjunction with PROM  Right scapular mobilizations all directions x 5 each      Response to treatment: Pt tolerated session well but remains limited with active right UE elevation due to tightness and weakness.  Slight increase in right shoulder pain at end range right shoulder flexion-sx

## 2025-06-25 ENCOUNTER — HOSPITAL ENCOUNTER (OUTPATIENT)
Age: 50
Setting detail: THERAPIES SERIES
Discharge: HOME | End: 2025-06-25
Attending: ORTHOPAEDIC SURGERY
Payer: COMMERCIAL

## 2025-06-25 PROCEDURE — 97140 MANUAL THERAPY 1/> REGIONS: CPT

## 2025-06-25 PROCEDURE — 97110 THERAPEUTIC EXERCISES: CPT

## 2025-06-26 ENCOUNTER — OFFICE VISIT (OUTPATIENT)
Age: 50
End: 2025-06-26

## 2025-06-26 VITALS — HEIGHT: 65 IN | WEIGHT: 195 LBS | BODY MASS INDEX: 32.49 KG/M2

## 2025-06-26 DIAGNOSIS — M25.611 SHOULDER STIFFNESS, RIGHT: Primary | ICD-10-CM

## 2025-06-26 PROCEDURE — 99024 POSTOP FOLLOW-UP VISIT: CPT | Performed by: NURSE PRACTITIONER

## 2025-06-26 NOTE — PROGRESS NOTES
[facility] Orthopedics & Sports Medicine      [facility] PHYSICIANS [facility] ORTHOPAEDICS AND SPORTS MEDICINE  [address] [place] [address] [address]  [place] [address]  Dept: [phone]  Dept Fax: [phone]    Chief Compliant:  Chief Complaint   Patient presents with    Shoulder Pain     R Shoulder         History of Present Illness:  6/26/25:This is a pleasant 50 y.o. female who is 2 weeks status post right shoulder arthroscopic capsular release with manipulation under anesthesia, extensive debridement of the labrum, humeral head chondroplasty, rotator cuff and subacromial bursectomy. States she has no pain but feels like a pulling sensation in the shoulder with motion. She is going to PT and feels her motion is improved. No new injuries or falls. She is not taking anything for pain.    Physical Exam: Right shoulder: Skin intact. Surgical incisions approximated and healing without dehiscence, drainage or erythema. She has approximately 90 degrees of active forward elevation with significant compensation of the trapezius muscle. Passively I can take her to about 120-130 though feels very tight, no pain.     Imaging: none      Assessment and Plan:    This is a pleasant 50 y.o. female who is status post above. Overall she is doing well. Sutures removed in office today. Continue PT and home exercises. Patient wishes to return to work on 6/30, I am fine with this as she is still able to go to therapy. Plan to re-evaluate in 4 weeks with Dr. Nicole.         Past History:    Current Outpatient Medications:     ibuprofen (ADVIL;MOTRIN) 800 MG tablet, Take 1 tablet by mouth 3 times daily (with meals) for 10 days, Disp: 30 tablet, Rfl: 0    rosuvastatin (CRESTOR) 5 MG tablet, Take 1 tablet by mouth daily, Disp: , Rfl:     OZEMPIC, 1 MG/DOSE, 4 MG/3ML SOPN sc injection, every 7 days, Disp: , Rfl:     Handicap Placard MISC, by Does not apply route 11/15/2022-2/16/2023, Disp: 1 each, Rfl: 0

## 2025-06-27 ENCOUNTER — HOSPITAL ENCOUNTER (OUTPATIENT)
Age: 50
Setting detail: THERAPIES SERIES
Discharge: HOME | End: 2025-06-27
Attending: ORTHOPAEDIC SURGERY
Payer: COMMERCIAL

## 2025-06-27 PROCEDURE — 97110 THERAPEUTIC EXERCISES: CPT

## 2025-06-27 PROCEDURE — 97140 MANUAL THERAPY 1/> REGIONS: CPT

## 2025-06-27 NOTE — FLOWSHEET NOTE
[facility] Rehabilitation &  Therapy  [address]  [place] [address]  [phone]  F: [phone]     Physical Therapy Daily Treatment Note    Date:  2025    Patient Name:  Liliana Ferrera    :  1975  MRN: [id number]  Physician: Efrain Nicole MD                                 Insurance: AE;  DED MET; 3000$ OOP;$1115.13 LEFT/10% ; 60/60 VISITS   Medical Diagnosis: shoulder stiffness (R) M25.611                        Rehab Codes: shoulder stiffness M25.611; shoulder pain M25.511  Onset Date: 25               Next 's appt: 25    Visit Count:                                 Cancel/No Show: 0/0    Subjective:    Pain:  [x] Yes  [] No   Location: (R) shoulder grossly, particularly in posterior lateral joint  Pain Rating: (0-10 scale) 1-2/10  Pain altered Tx:  [] Yes  [x] No  Action:    Comments: Pt reports continued right shoulder stiffness upon arising in the morning, denies resting shoulder pain, some right medial scapular discomfort. Pt states that she will return to work next Monday.      Objective:  Flexion 140 degrees with pulleys. Tightness with shoulder IR/ER pt educated on home stretching strategies.           Precautions:          Treatment provided:     Exercise Reps/ Time Weight/ Level Comments   pulleys 2' each  Flexion, abduction   UBE 4 min   2 min ea dir          Wand elevation 10 x 5\" 1#    Wand ER 10 x 5\" 1#    Wand extension 10 x 5\" 1#          Wall walk 10x  77\"         3- level rows X 20 5# green    Shoulder extension  X 20 5# green                 Supine shoulder flexion  X 15   1# bar    Supine chest press 2 x 10                              MANUAL 20  Grade 2-3 anterior, posterior, inferior glide, in conjunction with PROM  Right scapular mobilizations all directions x 5 each      Response to treatment:  Pt tolerated session well but remains limited with active right UE elevation and rotation due to tightness

## 2025-06-30 ENCOUNTER — HOSPITAL ENCOUNTER (OUTPATIENT)
Age: 50
Setting detail: THERAPIES SERIES
Discharge: HOME | End: 2025-06-30
Attending: ORTHOPAEDIC SURGERY
Payer: COMMERCIAL

## 2025-06-30 PROCEDURE — 97140 MANUAL THERAPY 1/> REGIONS: CPT

## 2025-06-30 PROCEDURE — 97110 THERAPEUTIC EXERCISES: CPT

## 2025-07-01 NOTE — FLOWSHEET NOTE
[facility] Rehabilitation &  Therapy  [address]  [place] [address]  [phone]  F: [phone]     Physical Therapy Daily Treatment Note    Date:  2025    Patient Name:  Liliana Ferrera    :  1975  MRN: [id number]  Physician: Efrain Nicole MD                                 Insurance: AE;  DED MET; 3000$ OOP;$1115.13 LEFT/10% ; 60/60 VISITS   Medical Diagnosis: shoulder stiffness (R) M25.611                        Rehab Codes: shoulder stiffness M25.611; shoulder pain M25.511  Onset Date: 25               Next 's appt: 25  Visit Count:                                 Cancel/No Show: 0/0    Subjective:    Pain:  [x] Yes  [] No   Location: (R) shoulder grossly, particularly in posterior lateral joint  Pain Rating: (0-10 scale) 1-2/10  Pain altered Tx:  [] Yes  [x] No  Action:    Comments: Pt continues to note significant complaints of stiffness/tightness.  Sore at beginning of day and then at the end of the day with work activity, but has been able to tolerate return to work at this time.    Objective:  25 Flexion 140 degrees with pulleys. Tightness with shoulder IR/ER pt educated on home stretching strategies.           Precautions:          Treatment provided:     Exercise Reps/ Time Weight/ Level Comments   pulleys 2' each  Flexion, abduction   UBE 4 min   2 min ea dir          Wand elevation 10 x 5\" 1#    Wand ER 10 x 5\" 1#    Wand extension 10 x 5\" 1#          Wall walk 10x  77\"         3- level rows X 20 5# green    Shoulder extension  X 20 5# green                 Supine shoulder flexion  X 15   1# bar    Supine chest press 2 x 10                              MANUAL 20  Grade 2-3 anterior, posterior, inferior glide, in conjunction with PROM  Right scapular mobilizations all directions x 5 each      Response to treatment:  Pt with emphasis on ROM program.  Joint mobility restricted minimally to moderately with

## 2025-07-02 ENCOUNTER — HOSPITAL ENCOUNTER (OUTPATIENT)
Age: 50
Setting detail: THERAPIES SERIES
Discharge: HOME | End: 2025-07-02
Attending: ORTHOPAEDIC SURGERY
Payer: COMMERCIAL

## 2025-07-02 PROCEDURE — 97110 THERAPEUTIC EXERCISES: CPT

## 2025-07-02 PROCEDURE — 97140 MANUAL THERAPY 1/> REGIONS: CPT

## 2025-07-02 NOTE — FLOWSHEET NOTE
[facility] Rehabilitation &  Therapy  [address]  [place] [address]  [phone]  F: [phone]     Physical Therapy Daily Treatment Note    Date:  2025    Patient Name:  Liliana Ferrera    :  1975  MRN: [id number]  Physician: Efrain Nicole MD                                 Insurance: AE;  DED MET; 3000$ OOP;$1115.13 LEFT/10% ; 60/60 VISITS   Medical Diagnosis: shoulder stiffness (R) M25.611                        Rehab Codes: shoulder stiffness M25.611; shoulder pain M25.511  Onset Date: 25               Next 's appt: 25  Visit Count:                                 Cancel/No Show: 0/0    Subjective:    Pain:  [x] Yes  [] No   Location: (R) shoulder grossly, particularly in posterior lateral joint  Pain Rating: (0-10 scale) 1-2/10  Pain altered Tx:  [] Yes  [x] No  Action:    Comments: Pt notes general soreness and tightness     continues to note significant complaints of stiffness/tightness.  Sore at beginning of day and then at the end of the day with work activity, but has been able to tolerate return to work at this time.    Objective:  25 Flexion 140 degrees with pulleys. Tightness with shoulder IR/ER pt educated on home stretching strategies.           Precautions:          Treatment provided:     Exercise Reps/ Time Weight/ Level Comments   pulleys 2' each  Flexion, abduction   UBE 4 min   2 min ea dir          Wand elevation 10 x 5\" 1#    Wand ER 10 x 5\" 1#    Wand extension 10 x 5\" 1#          Wall walk 10x  77\"         3- level rows X 20 5# green    Shoulder extension  X 20 5# green                 Supine shoulder flexion  X 15   1# bar    Supine chest press 2 x 10                              MANUAL 20  Grade 2-3 anterior, posterior, inferior glide, in conjunction with PROM  Right scapular mobilizations all directions x 5 each      Response to treatment:  Pt with emphasis on ROM program.  Joint mobility

## 2025-07-07 ENCOUNTER — HOSPITAL ENCOUNTER (OUTPATIENT)
Age: 50
Setting detail: THERAPIES SERIES
Discharge: HOME | End: 2025-07-07
Attending: ORTHOPAEDIC SURGERY
Payer: COMMERCIAL

## 2025-07-07 PROCEDURE — 97110 THERAPEUTIC EXERCISES: CPT

## 2025-07-07 PROCEDURE — 97140 MANUAL THERAPY 1/> REGIONS: CPT

## 2025-07-07 NOTE — FLOWSHEET NOTE
in the anterior/lateral proximal humerus today.  Did seem to have some improvement following this intervention with IR PROM specifically.      Treatment Charges: Mins Units   []  Modalities     [x]  Ther Exercise 25 2   [x]  Manual Therapy 20 1   []  Ther Activities     []  Neuro Re-ed     []  Vasocompression     [] Gait     []  Other     Total billable time 45 3       Assessment: [x] Progressing toward goals.    [] No change.     [] Other:              [x] Patient would continue to benefit from skilled physical therapy services in order to: work on mobility deficits and strength at end range movement.       STG: (to be met in 8 treatments) REASSESSED GROSSLY 7/7/25  ? Pain: 0-1/10 PROGRESSING  ? ROM: AROM flexion, abduction to 125 PROGRESSING MINIMALLY, AAROM/PROM to 150 degrees PROGRESSING, PROM 65 degrees ER, IR to 75 degrees to allow better general tolerance of reaching ADLs  ? Function: able to reach to first level of cabinets, able to wash hair, improved tolerance of reaching behind back by 50% self report  Independent with Home Exercise Programs     LTG: (to be met in 12 treatments)  ? Pain: 0/10 to help with general function   ? ROM: AROM flexion, abduction to 165, AAROM/PROM to 165 degrees, PROM 85 degrees ER, IR to 90 degrees to allow better general tolerance of reaching ADLs  ? Function: able to reach fully overhead, able to wash hair, improved tolerance of reaching behind back by 90% self report  Independent with Home Exercise Programs  Patient goals: better mobility     Functional Assessment Used: 34/130 SPADI 26% function  Current Status Score:  Goal Status Score 130/160 or better    Pt. Education:  [x] Yes  [] No  [x] Reviewed Prior HEP/Ed  Method of Education: [x] Verbal  [] Demo  [x] Written  Comprehension of Education:  [x] Verbalizes understanding.  [] Demonstrates understanding.  [] Needs review.  [] Demonstrates/verbalizes HEP/Ed previously given.    Access Code: [email or web address]  URL:

## 2025-07-09 ENCOUNTER — HOSPITAL ENCOUNTER (OUTPATIENT)
Age: 50
Setting detail: THERAPIES SERIES
Discharge: HOME | End: 2025-07-09
Attending: ORTHOPAEDIC SURGERY
Payer: COMMERCIAL

## 2025-07-09 PROCEDURE — 97140 MANUAL THERAPY 1/> REGIONS: CPT

## 2025-07-09 PROCEDURE — 97110 THERAPEUTIC EXERCISES: CPT

## 2025-07-09 NOTE — FLOWSHEET NOTE
[facility] Rehabilitation &  Therapy  [address], [address]  [place] [address]  [phone]  F: [phone]     Physical Therapy Daily Treatment Note    Date:  2025    Patient Name:  Liliana Ferrera    :  1975  MRN: [id number]  Physician: Efrain Nicole MD                                 Insurance: ;  DED MET; 3000$ OOP;$1115.13 LEFT/10% ; 60/60 VISITS   Medical Diagnosis: shoulder stiffness (R) M25.611                        Rehab Codes: shoulder stiffness M25.611; shoulder pain M25.511  Onset Date: 25               Next 's appt: 25    Visit Count:                                 Cancel/No Show: 0/0    Subjective:    Pain:  [x] Yes  [] No   Location: (R) shoulder grossly, particularly in posterior lateral joint  Pain Rating: (0-10 scale) 1-2/10  Pain altered Tx:  [] Yes  [x] No  Action:    Comments: Pt states that she has been having some right medial scapula stabbing pain occurring randomly.  Right shoulder soreness and weakness remains.     Objective:  25:  Remains tight and restricted with right shoulder ER/IR. Some improvements after IASTM and manual blocking with passive stretching to maintain neutral shoulder position.  Discomfort at end range in all right shoulder planes.  Soft-tissue restrictions noted with ISATM proximal bicep muscle bell and lateral shoulder.  Significant tightness of subscapularis.          Precautions:          Treatment provided:   Bold items not performed this date  Exercise Reps/ Time Weight/ Level Comments:     pulleys 2' each  Flexion, abduction   UBE 4 min   2 min ea dir          Wand elevation 10 x 5\" 1#    doorway ER 10 x 5\"     Wand extension 10 x 5\" 1#          Wall walk 10x  77\"         3- level rows X 20 5# green    Shoulder extension  X 20 5# green                 Supine shoulder flexion  X 15   1# bar    Supine chest press 2 x 10      Active shoulder IR/ER in supine 2 x 10   After IASTM and

## 2025-07-16 ENCOUNTER — HOSPITAL ENCOUNTER (OUTPATIENT)
Age: 50
Setting detail: THERAPIES SERIES
Discharge: HOME | End: 2025-07-16
Attending: ORTHOPAEDIC SURGERY
Payer: COMMERCIAL

## 2025-07-16 PROCEDURE — 97110 THERAPEUTIC EXERCISES: CPT

## 2025-07-16 PROCEDURE — 97140 MANUAL THERAPY 1/> REGIONS: CPT

## 2025-07-16 NOTE — FLOWSHEET NOTE
[facility] Rehabilitation &  Therapy  [address]  [place] [address]  [phone]  F: [phone]     Physical Therapy Daily Treatment Note    Date:  2025    Patient Name:  Liliana Ferrera    :  1975  MRN: [id number]  Physician: Efrain Nicole MD                                 Insurance: ;  DED MET; 3000$ OOP;$1115.13 LEFT/10% ; 60/60 VISITS   Medical Diagnosis: shoulder stiffness (R) M25.611                        Rehab Codes: shoulder stiffness M25.611; shoulder pain M25.511  Onset Date: 25               Next 's appt: 25    Visit Count: 10/12                                Cancel/No Show: 0/0    Subjective:    Pain:  [x] Yes  [] No   Location: (R) shoulder grossly, particularly in posterior lateral joint  Pain Rating: (0-10 scale) 1-2/10  Pain altered Tx:  [] Yes  [x] No  Action:    Comments: Pt states that she has been having some right medial scapula stabbing pain occurring randomly.  Right shoulder soreness and weakness remains.     Objective:  25:  Remains tight and restricted with right shoulder ER/IR.   Significant tightness of right subscapularis, medial scapular border with trigger point. Shoulder ROM improving passively, slow progression with active ROM.          Precautions:          Treatment provided:   Bold items not performed this date  Exercise Reps/ Time Weight/ Level Comments:     pulleys 2' each  Flexion, abduction   UBE 4 min   2 min ea dir          Wand elevation 10 x 5\" 1#    doorway ER 10 x 5\"     Wand extension 10 x 5\" 1#          Wall walk 10x  77\"         3- level rows X 20 5# green    Shoulder extension  X 20 5# green                 Supine shoulder flexion  X 15   1# bar    Supine chest press 2 x 10      Active shoulder IR/ER in supine 2 x 10   After IASTM and joint mobilizations                     MANUAL 20  Grade 2-3 anterior, posterior, inferior glide, in conjunction with PROM  Right scapular

## 2025-07-21 ENCOUNTER — HOSPITAL ENCOUNTER (OUTPATIENT)
Age: 50
Setting detail: THERAPIES SERIES
Discharge: HOME | End: 2025-07-21
Attending: ORTHOPAEDIC SURGERY
Payer: COMMERCIAL

## 2025-07-21 PROCEDURE — 97110 THERAPEUTIC EXERCISES: CPT

## 2025-07-21 PROCEDURE — 97140 MANUAL THERAPY 1/> REGIONS: CPT

## 2025-07-21 NOTE — FLOWSHEET NOTE
degrees to allow better general tolerance of reaching ADLs  ? Function: able to reach to first level of cabinets, able to wash hair, improved tolerance of reaching behind back by 50% self report  Independent with Home Exercise Programs     LTG: (to be met in 12 treatments)  ? Pain: 0/10 to help with general function   ? ROM: AROM flexion, abduction to 165, AAROM/PROM to 165 degrees, PROM 85 degrees ER, IR to 90 degrees to allow better general tolerance of reaching ADLs  ? Function: able to reach fully overhead, able to wash hair, improved tolerance of reaching behind back by 90% self report  Independent with Home Exercise Programs  Patient goals: better mobility     Functional Assessment Used: 34/130 SPADI 26% function  Current Status Score:  Goal Status Score 130/160 or better    Pt. Education:  [x] Yes  [] No  [x] Reviewed Prior HEP/Ed  Method of Education: [x] Verbal  [] Demo  [x] Written  Comprehension of Education:  [x] Verbalizes understanding.  [] Demonstrates understanding.  [] Needs review.  [] Demonstrates/verbalizes HEP/Ed previously given.    Access Code: [email or web address]  URL: [email or web address]  Date: 06/18/2025  Prepared by: Angel Pascal     Exercises  - Seated Shoulder Flexion AAROM with Pulley Behind  - 1 x daily - 30 reps  - Seated Shoulder Abduction AAROM with Pulley Behind  - 1 x daily - 30 reps  - Supine Shoulder Flexion with Dowel  - 1-2 x daily - 15 reps - 5 hold  - Supine Shoulder External Rotation in 45 Degrees Abduction AAROM with Dowel  - 1-2 x daily - 15 reps - 5 hold  - Standing Shoulder Extension AAROM with Dowel  - 1-2 x daily - 15 reps - 5 hold     Plan: [x] Continue current frequency toward long and short term goals.    [x] Specific Instructions for subsequent treatments: progression of manual and ROM program; possible addition of graston pin and pull/with mobilization      Time In: 1715           Time Out: 1800    Electronically signed by:  Daniel Aguirre PTA, GTS

## 2025-07-23 ENCOUNTER — HOSPITAL ENCOUNTER (OUTPATIENT)
Age: 50
Setting detail: THERAPIES SERIES
Discharge: HOME | End: 2025-07-23
Attending: ORTHOPAEDIC SURGERY
Payer: COMMERCIAL

## 2025-07-23 PROCEDURE — 97110 THERAPEUTIC EXERCISES: CPT

## 2025-07-23 PROCEDURE — 97140 MANUAL THERAPY 1/> REGIONS: CPT

## 2025-07-23 NOTE — FLOWSHEET NOTE
[facility] Rehabilitation &  Therapy  [address]  [place] [address]  [phone]  F: [phone]     Physical Therapy Daily Treatment Note/PROGRESS NOTE  Date:  2025  Patient Name:  Liliana Ferrera    :  1975  MRN: [id number]  Physician: Efrain Nicole MD                                 Insurance: AE;  DED MET; 3000$ OOP;$1115.13 LEFT/10% ; 60/60 VISITS   Medical Diagnosis: shoulder stiffness (R) M25.611                        Rehab Codes: shoulder stiffness M25.611; shoulder pain M25.511  Onset Date: 25               Next 's appt: 25  Visit Count:                                 Cancel/No Show: 0/0  Reporting period 25 to 25      Subjective:    Pain:  [x] Yes  [] No   Location: (R) shoulder grossly, particularly in posterior lateral joint  Pain Rating: (0-10 scale) 1-2/10 resting  Pain altered Tx:  [] Yes  [x] No  Action:    Comments: Pt noting/demonstrating improvement with active assistive motion at this time- able to get 115 degrees with flexion and ~120 abduction.  PROM has made some improvement since initial therapy.  Better tolerance of lifting/carrying at side.  Still limited with AROM overhead post surgically as well as with functional reaching ER to wash hair and functional IR reaching behind back.  Is very compliant with home program and stretching program.  Good tolerance of work activities at below shoulder level which requires some lifting/carrying.     Treatment has consisted on working on joint mobilization, scapular mobilization, instrument assisted Graston treatment around soft tissue particularly lateral to anterior incision, and then self PROM and AAROM within patient limits.    Objective:  25:  Remains tight and restricted with right shoulder flexion and ER/IR.   Shoulder ROM improving passively, slow progression with active ROM.   Supine active flexion 145 degrees  Standing active flexion

## 2025-07-24 ENCOUNTER — OFFICE VISIT (OUTPATIENT)
Age: 50
End: 2025-07-24

## 2025-07-24 DIAGNOSIS — M25.611 SHOULDER STIFFNESS, RIGHT: Primary | ICD-10-CM

## 2025-07-24 PROCEDURE — 99024 POSTOP FOLLOW-UP VISIT: CPT | Performed by: ORTHOPAEDIC SURGERY

## 2025-07-24 NOTE — PROGRESS NOTES
[facility] Orthopedics & Sports Medicine      [facility] PHYSICIANS [facility] ORTHOPAEDICS AND SPORTS MEDICINE  [address] [place] [address] [address]  [place] [address]  Dept: [phone]  Dept Fax: [phone]    Chief Compliant:  Chief Complaint   Patient presents with    Post-Op Check     Right shoulder sx f/u        History of Present Illness:  7/24/25:This is a pleasant 50 y.o. female who is now about 6 weeks status post right shoulder revision surgery treating adhesive capsulitis with arthroscopic capsular release with manipulation, extensive debridement of labrum and humeral head chondroplasty, rotator cuff, subacromial bursectomy.  She states that she does note some improvements in pain and motion compared to preoperatively although she states the motion is not significantly better.  She has been working hard in therapy.  She has a job that is much less physical now which allows her to do more.    Physical Exam: The right shoulder has about 110 degrees of forward elevation.  Passively she can get closer to 160 degrees.  External rotation with the elbow at the side is about 10 degrees.  Her incisions are all well-healed.    Imaging: None      Assessment and Plan:    This is a pleasant 50 y.o. female who is status post the above.  She still struggles with motion.  We discussed her intraoperative findings that her rotator cuff is intact and healed.  At this time her pain seems tolerable and my plan is continuing with PT and on her own doing aggressive stretching and PT at home.  I would like to see her back in 6 weeks.         Past History:    Current Outpatient Medications:     ibuprofen (ADVIL;MOTRIN) 800 MG tablet, Take 1 tablet by mouth 3 times daily (with meals) for 10 days, Disp: 30 tablet, Rfl: 0    rosuvastatin (CRESTOR) 5 MG tablet, Take 1 tablet by mouth daily, Disp: , Rfl:     OZEMPIC, 1 MG/DOSE, 4 MG/3ML SOPN sc injection, every 7 days, Disp: , Rfl:     Handicap Placard

## 2025-07-28 ENCOUNTER — HOSPITAL ENCOUNTER (OUTPATIENT)
Age: 50
Setting detail: THERAPIES SERIES
Discharge: HOME | End: 2025-07-28
Attending: ORTHOPAEDIC SURGERY
Payer: COMMERCIAL

## 2025-07-28 PROCEDURE — 97110 THERAPEUTIC EXERCISES: CPT

## 2025-07-28 PROCEDURE — 97140 MANUAL THERAPY 1/> REGIONS: CPT

## 2025-07-28 NOTE — FLOWSHEET NOTE
[email or web address]  Date: 06/18/2025  Prepared by: Angel Rotar     Exercises  - Seated Shoulder Flexion AAROM with Pulley Behind  - 1 x daily - 30 reps  - Seated Shoulder Abduction AAROM with Pulley Behind  - 1 x daily - 30 reps  - Supine Shoulder Flexion with Dowel  - 1-2 x daily - 15 reps - 5 hold  - Supine Shoulder External Rotation in 45 Degrees Abduction AAROM with Dowel  - 1-2 x daily - 15 reps - 5 hold  - Standing Shoulder Extension AAROM with Dowel  - 1-2 x daily - 15 reps - 5 hold     Plan: [x] Continue current frequency toward long and short term goals.    [x] Specific Instructions for subsequent treatments: progression of manual and ROM program; possible addition of graston pin and pull/with mobilization                                     Frequency: PT 2-3 times per week, 12 additional visits, 24 total        Time In: 1715           Time Out: 1800      Electronically signed by:  Daniel Aguirre PTA,

## 2025-07-30 ENCOUNTER — HOSPITAL ENCOUNTER (OUTPATIENT)
Age: 50
Setting detail: THERAPIES SERIES
Discharge: HOME | End: 2025-07-30
Attending: ORTHOPAEDIC SURGERY
Payer: COMMERCIAL

## 2025-07-30 PROCEDURE — 97140 MANUAL THERAPY 1/> REGIONS: CPT

## 2025-07-30 PROCEDURE — 97110 THERAPEUTIC EXERCISES: CPT

## 2025-07-30 NOTE — FLOWSHEET NOTE
[facility] Rehabilitation &  Therapy  [address]  [place] [address]  [phone]  F: [phone]     Physical Therapy Daily Treatment Note  Date:  2025  Patient Name:  Liliana Ferrera    :  1975  MRN: [id number]  Physician: Efrain Nicole MD                                 Insurance: ;  DED MET; 3000$ OOP;$1115.13 LEFT/10% ; 60/60 VISITS   Medical Diagnosis: shoulder stiffness (R) M25.611                        Rehab Codes: shoulder stiffness M25.611; shoulder pain M25.511  Onset Date: 25               Next 's appt: 25    Visit Count:                                 Cancel/No Show: 0/0    Subjective:    Pain:  [x] Yes  [] No   Location: (R) shoulder grossly, particularly in posterior lateral joint  Pain Rating: (0-10 scale) 1-2/10 resting  Pain altered Tx:  [] Yes  [x] No  Action:    Comments: Pt reports continued right UE motion and strength limitations.  Denies pain with activities below shoulder-level.  Pt able to perform all work tasks as most activities are below shoulder-level, overhead gunctional ability remains limited.     Objective:  25:  Remains tight and restricted with right shoulder flexion and ER/IR.   Shoulder ROM improving passively, slow progression with active ROM. Continues to demonstrate right shoulder elevation with dyskinesia with gravity resisted shoulder elevation.  Improved mechanics with supine elevation as pt cannot compensate as easily.     Previously documented:  Supine active flexion 145 degrees  Standing active flexion and ABD 90 degrees   Passive shoulder flexion in supine to 155 degrees     25 AAROM flexion 115, AAROM 120 degrees abduction   PROM ER 55 degrees \"tighter\" than IR, flexion posterior shoulder discomfort  PROM IR 55 degrees with compensation lateral humerus discomfort    Precautions:          Treatment provided:   Bold items performed this date  Exercise Reps/ Time

## 2025-08-04 ENCOUNTER — HOSPITAL ENCOUNTER (OUTPATIENT)
Age: 50
Setting detail: THERAPIES SERIES
Discharge: HOME | End: 2025-08-04
Attending: ORTHOPAEDIC SURGERY
Payer: COMMERCIAL

## 2025-08-04 PROCEDURE — 97140 MANUAL THERAPY 1/> REGIONS: CPT

## 2025-08-04 PROCEDURE — 97110 THERAPEUTIC EXERCISES: CPT

## 2025-08-06 ENCOUNTER — HOSPITAL ENCOUNTER (OUTPATIENT)
Age: 50
Setting detail: THERAPIES SERIES
Discharge: HOME | End: 2025-08-06
Attending: ORTHOPAEDIC SURGERY
Payer: COMMERCIAL

## 2025-08-06 ENCOUNTER — APPOINTMENT (OUTPATIENT)
Age: 50
End: 2025-08-06
Attending: ORTHOPAEDIC SURGERY
Payer: COMMERCIAL

## 2025-08-06 PROCEDURE — 97110 THERAPEUTIC EXERCISES: CPT

## 2025-08-06 PROCEDURE — 97140 MANUAL THERAPY 1/> REGIONS: CPT

## 2025-08-11 ENCOUNTER — HOSPITAL ENCOUNTER (OUTPATIENT)
Age: 50
Setting detail: THERAPIES SERIES
Discharge: HOME | End: 2025-08-11
Attending: ORTHOPAEDIC SURGERY
Payer: COMMERCIAL

## 2025-08-11 PROCEDURE — 97140 MANUAL THERAPY 1/> REGIONS: CPT

## 2025-08-11 PROCEDURE — 97110 THERAPEUTIC EXERCISES: CPT

## 2025-08-13 ENCOUNTER — HOSPITAL ENCOUNTER (OUTPATIENT)
Age: 50
Setting detail: THERAPIES SERIES
Discharge: HOME | End: 2025-08-13
Attending: ORTHOPAEDIC SURGERY
Payer: COMMERCIAL

## 2025-08-13 PROCEDURE — 97110 THERAPEUTIC EXERCISES: CPT

## 2025-08-13 PROCEDURE — 97140 MANUAL THERAPY 1/> REGIONS: CPT

## 2025-08-18 ENCOUNTER — HOSPITAL ENCOUNTER (OUTPATIENT)
Age: 50
Setting detail: THERAPIES SERIES
Discharge: HOME | End: 2025-08-18
Attending: ORTHOPAEDIC SURGERY
Payer: COMMERCIAL

## 2025-08-18 PROCEDURE — 97110 THERAPEUTIC EXERCISES: CPT

## 2025-08-18 PROCEDURE — 97140 MANUAL THERAPY 1/> REGIONS: CPT

## 2025-08-20 ENCOUNTER — HOSPITAL ENCOUNTER (OUTPATIENT)
Age: 50
Setting detail: THERAPIES SERIES
Discharge: HOME | End: 2025-08-20
Attending: ORTHOPAEDIC SURGERY
Payer: COMMERCIAL

## 2025-08-20 PROCEDURE — 97140 MANUAL THERAPY 1/> REGIONS: CPT

## 2025-08-20 PROCEDURE — 97110 THERAPEUTIC EXERCISES: CPT

## 2025-08-25 ENCOUNTER — HOSPITAL ENCOUNTER (OUTPATIENT)
Age: 50
Setting detail: THERAPIES SERIES
Discharge: HOME | End: 2025-08-25
Attending: ORTHOPAEDIC SURGERY
Payer: COMMERCIAL

## 2025-08-25 PROCEDURE — 97140 MANUAL THERAPY 1/> REGIONS: CPT

## 2025-08-25 PROCEDURE — 97110 THERAPEUTIC EXERCISES: CPT

## 2025-08-27 ENCOUNTER — HOSPITAL ENCOUNTER (OUTPATIENT)
Age: 50
Setting detail: THERAPIES SERIES
Discharge: HOME | End: 2025-08-27
Attending: ORTHOPAEDIC SURGERY
Payer: COMMERCIAL

## 2025-09-02 ENCOUNTER — HOSPITAL ENCOUNTER (OUTPATIENT)
Age: 50
Setting detail: THERAPIES SERIES
Discharge: HOME | End: 2025-09-02
Attending: ORTHOPAEDIC SURGERY
Payer: COMMERCIAL

## 2025-09-02 PROCEDURE — 97110 THERAPEUTIC EXERCISES: CPT

## 2025-09-02 PROCEDURE — 97140 MANUAL THERAPY 1/> REGIONS: CPT

## 2025-09-03 ENCOUNTER — HOSPITAL ENCOUNTER (OUTPATIENT)
Age: 50
Setting detail: THERAPIES SERIES
Discharge: HOME | End: 2025-09-03
Attending: ORTHOPAEDIC SURGERY
Payer: COMMERCIAL

## 2025-09-03 PROCEDURE — 97110 THERAPEUTIC EXERCISES: CPT

## 2025-09-03 PROCEDURE — 97140 MANUAL THERAPY 1/> REGIONS: CPT

## 2025-09-04 ENCOUNTER — OFFICE VISIT (OUTPATIENT)
Age: 50
End: 2025-09-04

## 2025-09-04 DIAGNOSIS — M25.611 SHOULDER STIFFNESS, RIGHT: ICD-10-CM

## 2025-09-04 DIAGNOSIS — M75.121 COMPLETE TEAR OF RIGHT ROTATOR CUFF, UNSPECIFIED WHETHER TRAUMATIC: Primary | ICD-10-CM

## 2025-09-04 PROCEDURE — 99024 POSTOP FOLLOW-UP VISIT: CPT | Performed by: ORTHOPAEDIC SURGERY

## 2025-09-05 PROBLEM — M75.01 ADHESIVE CAPSULITIS OF RIGHT SHOULDER: Status: ACTIVE | Noted: 2025-09-05

## (undated) DEVICE — SOL IRR SOD CHL 0.9% TITAN XL CNTNR 3000ML

## (undated) DEVICE — APPLICATOR MEDICATED 26 CC SOLUTION HI LT ORNG CHLORAPREP

## (undated) DEVICE — TUBING PMP L16FT MAIN DISP FOR AR-6400 AR-6475

## (undated) DEVICE — MHPB ARTHROSCOPY PACK: Brand: MEDLINE INDUSTRIES, INC.

## (undated) DEVICE — PROBE ABLAT XL 90DEG ASPIR BPLR RF 1 PC ELECTRD ERGO HNDL

## (undated) DEVICE — BANDAGE COMPR W6INXL12FT SMOOTH FOR LIMB EXSANG ESMARCH

## (undated) DEVICE — SKIN PREP TRAY W/CHG: Brand: MEDLINE INDUSTRIES, INC.

## (undated) DEVICE — CANN 5.5 WO/HOLES LTX FREE ORANGE

## (undated) DEVICE — PASSER SUT DIA1.8MM 25DEG L TIGHT CRV STIFF SHFT SHRP

## (undated) DEVICE — NDL CNTR 40CT FM MAG: Brand: MEDLINE INDUSTRIES, INC.

## (undated) DEVICE — STRIP WND CLSR W1 4XL4IN POLYAMIDE MACROPOROUS NONWOVEN H2O

## (undated) DEVICE — TUBING PMP L16FT MAIN DISP FOR AR-6400 AR-6475 Â€“ ORDER MULTIPLES OF 10 EACH

## (undated) DEVICE — GLOVE SURG SZ 8 L12IN THK75MIL DK GRN LTX FREE

## (undated) DEVICE — KIT DISP W/ SPEAR TRCR TIP OBT AND 2.6MM DRL FOR 2.6

## (undated) DEVICE — Device

## (undated) DEVICE — C-ARM: Brand: UNBRANDED

## (undated) DEVICE — ELECTRODE PT RET AD L9FT HI MOIST COND ADH HYDRGEL CORDED

## (undated) DEVICE — KIT CHAIR TRIMANO FOAM W/ SUPP ARM DRP ERGONOMICALLY DESIGNED

## (undated) DEVICE — 3M™ STERI-DRAPE™ INCISE DRAPE 1050 (60CM X 45CM): Brand: STERI-DRAPE™

## (undated) DEVICE — GAUZE,SPONGE,FLUFF,6"X6.75",STRL,5/TRAY: Brand: MEDLINE

## (undated) DEVICE — TOWEL,OR,DSP,ST,BLUE,DLX,XR,4/PK,20PK/CS: Brand: MEDLINE

## (undated) DEVICE — ELECTRODE ES L3IN S STL BLDE INSUL DISP VALLEYLAB EDGE

## (undated) DEVICE — 3M™ STERI-DRAPE™ U-DRAPE 1015: Brand: STERI-DRAPE™

## (undated) DEVICE — PRECISION (9.0 X 0.51 X 31.0MM)

## (undated) DEVICE — POUCH, INSTRUMENT, 3POCKET, INVISISHIELD: Brand: MEDLINE

## (undated) DEVICE — BUR SHV L13CM DIA5MM 8 FLUT OVL FLUSHCUT AGG COOLCUT

## (undated) DEVICE — CONTAINER,SPECIMEN,OR STERILE,4OZ: Brand: MEDLINE

## (undated) DEVICE — BLADE SHV L13CM DIA4MM BNE CUT AGG DEB COOLCUT

## (undated) DEVICE — YANKAUER,SMOOTH HANDLE,HIGH CAPACITY: Brand: MEDLINE INDUSTRIES, INC.

## (undated) DEVICE — NEEDLE SCORPION HD MEGA LOADER

## (undated) DEVICE — GLOVE ORANGE PI 8   MSG9080

## (undated) DEVICE — BLANKET WRM W40.2XL55.9IN IORT LO BODY + MISTRAL AIR

## (undated) DEVICE — PROTECTOR ULN NRV PUR FOAM HK LOOP STRP ANATOMICALLY

## (undated) DEVICE — FIBERTAPE 1.7MM 30 8 TAPE BL

## (undated) DEVICE — DRESSING TRNSPAR W5XL4.5IN FLM SHT SEMIPERMEABLE WIND

## (undated) DEVICE — C-ARMOR C-ARM EQUIPMENT COVERS CLEAR STERILE UNIVERSAL FIT 12 PER CASE: Brand: C-ARMOR

## (undated) DEVICE — SOLUTION IRRIG 3000ML 0.9% SOD CHL USP UROMATIC PLAS CONT

## (undated) DEVICE — STRAP,POSITIONING,KNEE/BODY,FOAM,4X60": Brand: MEDLINE

## (undated) DEVICE — CLOTH PRE OP W9XL10.5IN 2% CHG FRAGRANCE RNS FREE READYPREP

## (undated) DEVICE — SOLUTION IRRIG 1000ML 09% SOD CHL USP PIC PLAS CONTAINER

## (undated) DEVICE — INTENDED FOR TISSUE SEPARATION, AND OTHER PROCEDURES THAT REQUIRE A SHARP SURGICAL BLADE TO PUNCTURE OR CUT.: Brand: BARD-PARKER ® CARBON RIB-BACK BLADES

## (undated) DEVICE — CANNULA THREADED DISP 8.5 X 72MM: Brand: CLEAR-TRAC

## (undated) DEVICE — SUTURE FIBERWIRE SZ 2 W/ TAPERED NEEDLE BLUE L38IN NONABSORB BLU L26.5MM 1/2 CIRCLE AR7200

## (undated) DEVICE — SUTURE PROL SZ 3-0 L18IN NONABSORBABLE BLU L24MM FS-1 3/8 8684G

## (undated) DEVICE — SUTURE VCRL SZ 2-0 L27IN ABSRB UD L26MM CT-2 1/2 CIR J269H

## (undated) DEVICE — GLOVE SURG SZ 65 THK91MIL LTX FREE SYN POLYISOPRENE

## (undated) DEVICE — BLANKET WRM W29.9XL79.1IN UP BODY FORC AIR MISTRAL-AIR

## (undated) DEVICE — PADDING,UNDERCAST,COTTON, 4"X4YD STERILE: Brand: MEDLINE

## (undated) DEVICE — SMARTGOWN SURGICAL GOWN, 3XL, LONG: Brand: CONVERTORS

## (undated) DEVICE — SUTURE SUTTAPE FIBERLINK 1.3MM WHT BLU CLS LOOP AR7535

## (undated) DEVICE — SUTURE ETHLN SZ 3-0 L18IN NONABSORBABLE BLK PS-2 L19MM 3/8 1669H

## (undated) DEVICE — DRAPE,U/ SHT,SPLIT,PLAS,STERIL: Brand: MEDLINE

## (undated) DEVICE — PAD,ABDOMINAL,5"X9",ST,LF,25/BX: Brand: MEDLINE INDUSTRIES, INC.

## (undated) DEVICE — DRAPE,SHOULDER,BEACH CHAIR,STERILE: Brand: MEDLINE

## (undated) DEVICE — ASPIRATOR FLR L72IN SUCT TB W/ 1 DETACH FISH STK PUSH HNDL

## (undated) DEVICE — PASSER SUT DIA1.8MM NIT CRESC STIFF SHFT SHRP ATRAUM TIP

## (undated) DEVICE — DRAPE,REIN 53X77,STERILE: Brand: MEDLINE

## (undated) DEVICE — SOLUTION IRRIG 1000ML 0.9% SOD CHL USP POUR PLAS BTL

## (undated) DEVICE — NEPTUNE E-SEP SMOKE EVACUATION PENCIL, COATED, 70MM BLADE, PUSH BUTTON SWITCH: Brand: NEPTUNE E-SEP

## (undated) DEVICE — GLOVE SURG SZ 8 CRM LTX FREE POLYISOPRENE POLYMER BEAD ANTI

## (undated) DEVICE — IMMOBILIZER ORTH CONTACT CLOSURE STRP LG 18X9 IN PROCARE

## (undated) DEVICE — DRESSING PETRO W3XL8IN OIL EMUL N ADH GZ KNIT IMPREG CELOS

## (undated) DEVICE — GLOVE SURG SZ 85 L12IN FNGR THK79MIL GRN LTX FREE

## (undated) DEVICE — SPONGE LAP W18XL18IN WHT COT 4 PLY FLD STRUNG RADPQ DISP ST

## (undated) DEVICE — GARMENT,MEDLINE,DVT,INT,CALF,MED, GEN2: Brand: MEDLINE

## (undated) DEVICE — SUTURE MONOCRYL SZ 3 0 L18IN ABSRB UD PS 2 3 8 CIR REV CUT NDL MCP497G